# Patient Record
Sex: FEMALE
[De-identification: names, ages, dates, MRNs, and addresses within clinical notes are randomized per-mention and may not be internally consistent; named-entity substitution may affect disease eponyms.]

---

## 2024-02-04 ENCOUNTER — HOSPITAL ENCOUNTER (EMERGENCY)
Dept: HOSPITAL 82 - ED | Age: 79
Discharge: HOME | End: 2024-02-04
Payer: MEDICARE

## 2024-02-04 VITALS — DIASTOLIC BLOOD PRESSURE: 64 MMHG | SYSTOLIC BLOOD PRESSURE: 143 MMHG

## 2024-02-04 VITALS — SYSTOLIC BLOOD PRESSURE: 126 MMHG | DIASTOLIC BLOOD PRESSURE: 46 MMHG

## 2024-02-04 VITALS — BODY MASS INDEX: 20.9 KG/M2 | HEIGHT: 66 IN | WEIGHT: 130.07 LBS

## 2024-02-04 VITALS — SYSTOLIC BLOOD PRESSURE: 125 MMHG | DIASTOLIC BLOOD PRESSURE: 61 MMHG

## 2024-02-04 VITALS — DIASTOLIC BLOOD PRESSURE: 64 MMHG | SYSTOLIC BLOOD PRESSURE: 125 MMHG

## 2024-02-04 VITALS — SYSTOLIC BLOOD PRESSURE: 126 MMHG | DIASTOLIC BLOOD PRESSURE: 56 MMHG

## 2024-02-04 VITALS — DIASTOLIC BLOOD PRESSURE: 57 MMHG | SYSTOLIC BLOOD PRESSURE: 123 MMHG

## 2024-02-04 VITALS — DIASTOLIC BLOOD PRESSURE: 54 MMHG | SYSTOLIC BLOOD PRESSURE: 126 MMHG

## 2024-02-04 DIAGNOSIS — Z20.822: ICD-10-CM

## 2024-02-04 DIAGNOSIS — R51.9: Primary | ICD-10-CM

## 2024-02-04 LAB
ALBUMIN SERPL-MCNC: 3.9 G/DL (ref 3.2–5)
ALP SERPL-CCNC: 94 U/L (ref 38–126)
ANION GAP SERPL CALCULATED.3IONS-SCNC: 10 MMOL/L
AST SERPL-CCNC: 31 U/L (ref 9–36)
BASOPHILS NFR BLD AUTO: 0.4 % (ref 0–3)
BUN SERPL-MCNC: 13 MG/DL (ref 8–23)
BUN/CREAT SERPL: 25
CHLORIDE SERPL-SCNC: 98 MMOL/L (ref 95–108)
CO2 SERPL-SCNC: 28 MMOL/L (ref 22–30)
CREAT SERPL-MCNC: 0.5 MG/DL (ref 0.5–1)
EOSINOPHIL NFR BLD AUTO: 0.2 % (ref 0–8)
ERYTHROCYTE [DISTWIDTH] IN BLOOD BY AUTOMATED COUNT: 14.1 % (ref 11.5–15.5)
HCT VFR BLD AUTO: 25.4 % (ref 37–47)
HGB BLD-MCNC: 8.3 G/DL (ref 12–16)
IMM GRANULOCYTES NFR BLD: 0.5 % (ref 0–5)
LYMPHOCYTES NFR BLD: 3.2 % (ref 15–41)
MCH RBC QN AUTO: 31.1 PG  CALC (ref 26–32)
MCHC RBC AUTO-ENTMCNC: 32.7 G/DL CAL (ref 32–36)
MCV RBC AUTO: 95.1 FL  CALC (ref 80–100)
MONOCYTES NFR BLD AUTO: 7.1 % (ref 2–13)
NEUTROPHILS # BLD AUTO: 12.57 THOU/UL (ref 2–7.15)
NEUTROPHILS NFR BLD AUTO: 88.6 % (ref 42–76)
PLATELET # BLD AUTO: 288 THOU/UL (ref 130–400)
POTASSIUM SERPL-SCNC: 3.3 MMOL/L (ref 3.5–5.1)
PROT SERPL-MCNC: 7.2 G/DL (ref 6.3–8.2)
RBC # BLD AUTO: 2.67 MILL/UL (ref 4.2–5.6)
SODIUM SERPL-SCNC: 133 MMOL/L (ref 137–146)

## 2024-08-07 ENCOUNTER — HOSPITAL ENCOUNTER (OUTPATIENT)
Facility: HOSPITAL | Age: 79
Discharge: HOME | End: 2024-08-10
Payer: MEDICARE

## 2024-08-07 LAB
ALBUMIN SERPL-MCNC: 4.1 G/DL (ref 3.4–5)
ALBUMIN/GLOB SERPL: 1.4 (ref 0.8–1.7)
ALP SERPL-CCNC: 66 U/L (ref 45–117)
ALT SERPL-CCNC: 42 U/L (ref 13–56)
ANION GAP SERPL CALC-SCNC: 6 MMOL/L (ref 3–18)
APPEARANCE UR: ABNORMAL
APTT PPP: 25 SEC (ref 23–36.4)
AST SERPL-CCNC: 32 U/L (ref 10–38)
BACTERIA URNS QL MICRO: ABNORMAL /HPF
BILIRUB SERPL-MCNC: 0.8 MG/DL (ref 0.2–1)
BILIRUB UR QL: NEGATIVE
BUN SERPL-MCNC: 19 MG/DL (ref 7–18)
BUN/CREAT SERPL: 26 (ref 12–20)
CALCIUM SERPL-MCNC: 10 MG/DL (ref 8.5–10.1)
CHLORIDE SERPL-SCNC: 103 MMOL/L (ref 100–111)
CO2 SERPL-SCNC: 31 MMOL/L (ref 21–32)
COLOR UR: YELLOW
CREAT SERPL-MCNC: 0.74 MG/DL (ref 0.6–1.3)
EPITH CASTS URNS QL MICRO: ABNORMAL /LPF (ref 0–5)
ERYTHROCYTE [DISTWIDTH] IN BLOOD BY AUTOMATED COUNT: 18.9 % (ref 11.6–14.5)
ERYTHROCYTE [SEDIMENTATION RATE] IN BLOOD: 4 MM/HR (ref 0–30)
GLOBULIN SER CALC-MCNC: 3 G/DL (ref 2–4)
GLUCOSE SERPL-MCNC: 113 MG/DL (ref 74–99)
GLUCOSE UR STRIP.AUTO-MCNC: NEGATIVE MG/DL
HCT VFR BLD AUTO: 36.5 % (ref 35–45)
HGB BLD-MCNC: 12 G/DL (ref 12–16)
HGB UR QL STRIP: NEGATIVE
INR PPP: 0.9 (ref 0.9–1.1)
KETONES UR QL STRIP.AUTO: ABNORMAL MG/DL
LEUKOCYTE ESTERASE UR QL STRIP.AUTO: ABNORMAL
MCH RBC QN AUTO: 29.6 PG (ref 24–34)
MCHC RBC AUTO-ENTMCNC: 32.9 G/DL (ref 31–37)
MCV RBC AUTO: 89.9 FL (ref 78–100)
NITRITE UR QL STRIP.AUTO: NEGATIVE
NRBC # BLD: 0 K/UL (ref 0–0.01)
NRBC BLD-RTO: 0 PER 100 WBC
PH UR STRIP: 7.5 (ref 5–8)
PLATELET # BLD AUTO: 228 K/UL (ref 135–420)
PMV BLD AUTO: 9.5 FL (ref 9.2–11.8)
POTASSIUM SERPL-SCNC: 3.8 MMOL/L (ref 3.5–5.5)
PROT SERPL-MCNC: 7.1 G/DL (ref 6.4–8.2)
PROT UR STRIP-MCNC: NEGATIVE MG/DL
PROTHROMBIN TIME: 12.5 SEC (ref 11.9–14.9)
RBC # BLD AUTO: 4.06 M/UL (ref 4.2–5.3)
RBC #/AREA URNS HPF: ABNORMAL /HPF (ref 0–5)
SODIUM SERPL-SCNC: 140 MMOL/L (ref 136–145)
SP GR UR REFRACTOMETRY: 1.01 (ref 1–1.03)
UROBILINOGEN UR QL STRIP.AUTO: 0.2 EU/DL (ref 0.2–1)
WBC # BLD AUTO: 10.4 K/UL (ref 4.6–13.2)
WBC URNS QL MICRO: ABNORMAL /HPF (ref 0–5)

## 2024-08-07 PROCEDURE — 85027 COMPLETE CBC AUTOMATED: CPT

## 2024-08-07 PROCEDURE — 85652 RBC SED RATE AUTOMATED: CPT

## 2024-08-07 PROCEDURE — 85610 PROTHROMBIN TIME: CPT

## 2024-08-07 PROCEDURE — 85730 THROMBOPLASTIN TIME PARTIAL: CPT

## 2024-08-07 PROCEDURE — 87086 URINE CULTURE/COLONY COUNT: CPT

## 2024-08-07 PROCEDURE — 36415 COLL VENOUS BLD VENIPUNCTURE: CPT

## 2024-08-07 PROCEDURE — 81001 URINALYSIS AUTO W/SCOPE: CPT

## 2024-08-07 PROCEDURE — 93005 ELECTROCARDIOGRAM TRACING: CPT | Performed by: ORTHOPAEDIC SURGERY

## 2024-08-07 PROCEDURE — 80053 COMPREHEN METABOLIC PANEL: CPT

## 2024-08-08 LAB
BACTERIA SPEC CULT: NORMAL
BACTERIA SPEC CULT: NORMAL
EKG ATRIAL RATE: 61 BPM
EKG DIAGNOSIS: NORMAL
EKG P AXIS: 56 DEGREES
EKG P-R INTERVAL: 134 MS
EKG Q-T INTERVAL: 406 MS
EKG QRS DURATION: 78 MS
EKG QTC CALCULATION (BAZETT): 408 MS
EKG R AXIS: 71 DEGREES
EKG T AXIS: 69 DEGREES
EKG VENTRICULAR RATE: 61 BPM
SERVICE CMNT-IMP: NORMAL

## 2024-08-09 LAB
BACTERIA SPEC CULT: NORMAL
SERVICE CMNT-IMP: NORMAL

## 2024-08-24 ENCOUNTER — HOSPITAL ENCOUNTER (INPATIENT)
Facility: HOSPITAL | Age: 79
LOS: 14 days | Discharge: NURSING, REHAB OR LONG-TERM CARE | DRG: 467 | End: 2024-09-07
Attending: ORTHOPAEDIC SURGERY | Admitting: ORTHOPAEDIC SURGERY
Payer: MEDICARE

## 2024-08-24 DIAGNOSIS — M00.00 STAPHYLOCOCCAL ARTHRITIS, SEPTIC ARTHRITIS OF UNSPECIFIED LOCATION (HCC): Primary | ICD-10-CM

## 2024-08-24 DIAGNOSIS — Z98.890 S/P ARTHROSCOPY OF RIGHT KNEE: ICD-10-CM

## 2024-08-24 DIAGNOSIS — T84.53XA INFECTION AND INFLAMMATORY REACTION DUE TO INTERNAL RIGHT KNEE PROSTHESIS, INITIAL ENCOUNTER (HCC): ICD-10-CM

## 2024-08-24 DIAGNOSIS — Z96.651 S/P REVISION OF TOTAL KNEE, RIGHT: ICD-10-CM

## 2024-08-24 PROCEDURE — 1100000000 HC RM PRIVATE

## 2024-08-24 PROCEDURE — 02H633Z INSERTION OF INFUSION DEVICE INTO RIGHT ATRIUM, PERCUTANEOUS APPROACH: ICD-10-PCS | Performed by: HOSPITALIST

## 2024-08-24 PROCEDURE — 2580000003 HC RX 258: Performed by: ORTHOPAEDIC SURGERY

## 2024-08-24 RX ORDER — SODIUM CHLORIDE 9 MG/ML
INJECTION, SOLUTION INTRAVENOUS CONTINUOUS
Status: DISCONTINUED | OUTPATIENT
Start: 2024-08-24 | End: 2024-08-27 | Stop reason: HOSPADM

## 2024-08-24 RX ORDER — ONDANSETRON 4 MG/1
4 TABLET, ORALLY DISINTEGRATING ORAL EVERY 6 HOURS PRN
Status: DISCONTINUED | OUTPATIENT
Start: 2024-08-24 | End: 2024-08-26 | Stop reason: ALTCHOICE

## 2024-08-24 RX ORDER — OXYCODONE HYDROCHLORIDE 5 MG/1
5 TABLET ORAL EVERY 4 HOURS PRN
Status: DISCONTINUED | OUTPATIENT
Start: 2024-08-24 | End: 2024-08-27

## 2024-08-24 RX ADMIN — SODIUM CHLORIDE: 9 INJECTION, SOLUTION INTRAVENOUS at 23:28

## 2024-08-24 ASSESSMENT — PAIN SCALES - GENERAL: PAINLEVEL_OUTOF10: 5

## 2024-08-24 ASSESSMENT — PAIN DESCRIPTION - PAIN TYPE: TYPE: CHRONIC PAIN

## 2024-08-24 ASSESSMENT — PAIN DESCRIPTION - LOCATION: LOCATION: KNEE

## 2024-08-24 ASSESSMENT — PAIN DESCRIPTION - DESCRIPTORS: DESCRIPTORS: SHARP;ACHING

## 2024-08-24 ASSESSMENT — PAIN - FUNCTIONAL ASSESSMENT: PAIN_FUNCTIONAL_ASSESSMENT: PREVENTS OR INTERFERES WITH MANY ACTIVE NOT PASSIVE ACTIVITIES

## 2024-08-24 ASSESSMENT — PAIN DESCRIPTION - ORIENTATION: ORIENTATION: RIGHT

## 2024-08-24 ASSESSMENT — PAIN DESCRIPTION - FREQUENCY: FREQUENCY: INTERMITTENT

## 2024-08-24 ASSESSMENT — PAIN DESCRIPTION - ONSET: ONSET: ON-GOING

## 2024-08-25 PROBLEM — M00.9 SEPTIC JOINT (HCC): Status: ACTIVE | Noted: 2024-08-25

## 2024-08-25 PROBLEM — M31.6 TEMPORAL ARTERITIS (HCC): Status: ACTIVE | Noted: 2024-08-25

## 2024-08-25 PROBLEM — E87.6 HYPOKALEMIA: Status: ACTIVE | Noted: 2024-08-25

## 2024-08-25 PROBLEM — I10 HTN (HYPERTENSION): Status: ACTIVE | Noted: 2024-08-25

## 2024-08-25 LAB
ALBUMIN SERPL-MCNC: 3.2 G/DL (ref 3.4–5)
ALBUMIN/GLOB SERPL: 1.3 (ref 0.8–1.7)
ALP SERPL-CCNC: 53 U/L (ref 45–117)
ALT SERPL-CCNC: 22 U/L (ref 13–56)
ANION GAP SERPL CALC-SCNC: 7 MMOL/L (ref 3–18)
APPEARANCE UR: CLEAR
AST SERPL-CCNC: 20 U/L (ref 10–38)
BACTERIA URNS QL MICRO: ABNORMAL /HPF
BILIRUB SERPL-MCNC: 0.4 MG/DL (ref 0.2–1)
BILIRUB UR QL: NEGATIVE
BUN SERPL-MCNC: 12 MG/DL (ref 7–18)
BUN/CREAT SERPL: 21 (ref 12–20)
CALCIUM SERPL-MCNC: 9.1 MG/DL (ref 8.5–10.1)
CHLORIDE SERPL-SCNC: 107 MMOL/L (ref 100–111)
CO2 SERPL-SCNC: 27 MMOL/L (ref 21–32)
COLOR UR: YELLOW
CREAT SERPL-MCNC: 0.57 MG/DL (ref 0.6–1.3)
EPITH CASTS URNS QL MICRO: ABNORMAL /LPF (ref 0–5)
GLOBULIN SER CALC-MCNC: 2.4 G/DL (ref 2–4)
GLUCOSE SERPL-MCNC: 126 MG/DL (ref 74–99)
GLUCOSE UR STRIP.AUTO-MCNC: NEGATIVE MG/DL
HGB UR QL STRIP: NEGATIVE
KETONES UR QL STRIP.AUTO: 15 MG/DL
LEUKOCYTE ESTERASE UR QL STRIP.AUTO: ABNORMAL
NITRITE UR QL STRIP.AUTO: NEGATIVE
PH UR STRIP: 6.5 (ref 5–8)
POTASSIUM SERPL-SCNC: 3.6 MMOL/L (ref 3.5–5.5)
PROT SERPL-MCNC: 5.6 G/DL (ref 6.4–8.2)
PROT UR STRIP-MCNC: NEGATIVE MG/DL
RBC #/AREA URNS HPF: ABNORMAL /HPF (ref 0–5)
SODIUM SERPL-SCNC: 141 MMOL/L (ref 136–145)
SP GR UR REFRACTOMETRY: 1.02 (ref 1–1.03)
UROBILINOGEN UR QL STRIP.AUTO: 1 EU/DL (ref 0.2–1)
WBC URNS QL MICRO: ABNORMAL /HPF (ref 0–5)

## 2024-08-25 PROCEDURE — 6370000000 HC RX 637 (ALT 250 FOR IP): Performed by: INTERNAL MEDICINE

## 2024-08-25 PROCEDURE — 84443 ASSAY THYROID STIM HORMONE: CPT

## 2024-08-25 PROCEDURE — 80053 COMPREHEN METABOLIC PANEL: CPT

## 2024-08-25 PROCEDURE — 87086 URINE CULTURE/COLONY COUNT: CPT

## 2024-08-25 PROCEDURE — 6370000000 HC RX 637 (ALT 250 FOR IP): Performed by: ORTHOPAEDIC SURGERY

## 2024-08-25 PROCEDURE — 85025 COMPLETE CBC W/AUTO DIFF WBC: CPT

## 2024-08-25 PROCEDURE — 81001 URINALYSIS AUTO W/SCOPE: CPT

## 2024-08-25 PROCEDURE — 86140 C-REACTIVE PROTEIN: CPT

## 2024-08-25 PROCEDURE — 1100000000 HC RM PRIVATE

## 2024-08-25 PROCEDURE — 87040 BLOOD CULTURE FOR BACTERIA: CPT

## 2024-08-25 PROCEDURE — 85652 RBC SED RATE AUTOMATED: CPT

## 2024-08-25 PROCEDURE — 6360000002 HC RX W HCPCS: Performed by: INTERNAL MEDICINE

## 2024-08-25 PROCEDURE — 2580000003 HC RX 258: Performed by: ORTHOPAEDIC SURGERY

## 2024-08-25 PROCEDURE — 36415 COLL VENOUS BLD VENIPUNCTURE: CPT

## 2024-08-25 PROCEDURE — 2580000003 HC RX 258: Performed by: INTERNAL MEDICINE

## 2024-08-25 PROCEDURE — 6360000002 HC RX W HCPCS: Performed by: ORTHOPAEDIC SURGERY

## 2024-08-25 RX ORDER — M-VIT,TX,IRON,MINS/CALC/FOLIC 27MG-0.4MG
1 TABLET ORAL DAILY
Status: DISCONTINUED | OUTPATIENT
Start: 2024-08-25 | End: 2024-09-07 | Stop reason: HOSPADM

## 2024-08-25 RX ORDER — POLYETHYLENE GLYCOL 3350 17 G/17G
17 POWDER, FOR SOLUTION ORAL DAILY PRN
Status: DISCONTINUED | OUTPATIENT
Start: 2024-08-25 | End: 2024-09-07 | Stop reason: HOSPADM

## 2024-08-25 RX ORDER — CALCIUM POLYCARBOPHIL 625 MG 625 MG/1
625 TABLET ORAL 2 TIMES DAILY
Status: DISCONTINUED | OUTPATIENT
Start: 2024-08-25 | End: 2024-09-07 | Stop reason: HOSPADM

## 2024-08-25 RX ORDER — PREDNISONE 1 MG/1
4 TABLET ORAL DAILY
Status: DISCONTINUED | OUTPATIENT
Start: 2024-08-26 | End: 2024-09-07 | Stop reason: HOSPADM

## 2024-08-25 RX ORDER — POTASSIUM CHLORIDE 1500 MG/1
40 TABLET, EXTENDED RELEASE ORAL ONCE
Status: COMPLETED | OUTPATIENT
Start: 2024-08-25 | End: 2024-08-25

## 2024-08-25 RX ORDER — SODIUM CHLORIDE 0.9 % (FLUSH) 0.9 %
5-40 SYRINGE (ML) INJECTION PRN
Status: DISCONTINUED | OUTPATIENT
Start: 2024-08-25 | End: 2024-08-28

## 2024-08-25 RX ORDER — SODIUM CHLORIDE 0.9 % (FLUSH) 0.9 %
5-40 SYRINGE (ML) INJECTION EVERY 12 HOURS SCHEDULED
Status: DISCONTINUED | OUTPATIENT
Start: 2024-08-25 | End: 2024-08-28

## 2024-08-25 RX ORDER — ACETAMINOPHEN 500 MG
1000 TABLET ORAL EVERY 6 HOURS PRN
Status: DISCONTINUED | OUTPATIENT
Start: 2024-08-25 | End: 2024-09-07 | Stop reason: HOSPADM

## 2024-08-25 RX ORDER — PREDNISONE 5 MG/1
5 TABLET ORAL DAILY
Status: DISCONTINUED | OUTPATIENT
Start: 2024-08-25 | End: 2024-08-25

## 2024-08-25 RX ORDER — PANTOPRAZOLE SODIUM 40 MG/1
40 TABLET, DELAYED RELEASE ORAL
Status: DISCONTINUED | OUTPATIENT
Start: 2024-08-26 | End: 2024-09-07 | Stop reason: HOSPADM

## 2024-08-25 RX ORDER — HYDRALAZINE HYDROCHLORIDE 20 MG/ML
10 INJECTION INTRAMUSCULAR; INTRAVENOUS EVERY 6 HOURS PRN
Status: DISCONTINUED | OUTPATIENT
Start: 2024-08-25 | End: 2024-09-07 | Stop reason: HOSPADM

## 2024-08-25 RX ORDER — ONDANSETRON 2 MG/ML
4 INJECTION INTRAMUSCULAR; INTRAVENOUS EVERY 6 HOURS PRN
Status: DISCONTINUED | OUTPATIENT
Start: 2024-08-25 | End: 2024-09-07 | Stop reason: HOSPADM

## 2024-08-25 RX ORDER — ENOXAPARIN SODIUM 100 MG/ML
40 INJECTION SUBCUTANEOUS DAILY
Status: DISCONTINUED | OUTPATIENT
Start: 2024-08-26 | End: 2024-09-07 | Stop reason: HOSPADM

## 2024-08-25 RX ORDER — ONDANSETRON 4 MG/1
4 TABLET, ORALLY DISINTEGRATING ORAL EVERY 8 HOURS PRN
Status: DISCONTINUED | OUTPATIENT
Start: 2024-08-25 | End: 2024-09-07 | Stop reason: HOSPADM

## 2024-08-25 RX ORDER — SODIUM CHLORIDE 9 MG/ML
INJECTION, SOLUTION INTRAVENOUS PRN
Status: DISCONTINUED | OUTPATIENT
Start: 2024-08-25 | End: 2024-08-28

## 2024-08-25 RX ADMIN — VANCOMYCIN HYDROCHLORIDE 750 MG: 750 INJECTION, POWDER, LYOPHILIZED, FOR SOLUTION INTRAVENOUS at 17:25

## 2024-08-25 RX ADMIN — Medication 1 TABLET: at 20:39

## 2024-08-25 RX ADMIN — SODIUM CHLORIDE, PRESERVATIVE FREE 10 ML: 5 INJECTION INTRAVENOUS at 20:48

## 2024-08-25 RX ADMIN — OXYCODONE HYDROCHLORIDE 5 MG: 5 TABLET ORAL at 12:47

## 2024-08-25 RX ADMIN — POTASSIUM CHLORIDE 40 MEQ: 1500 TABLET, EXTENDED RELEASE ORAL at 20:34

## 2024-08-25 RX ADMIN — OXYCODONE HYDROCHLORIDE 5 MG: 5 TABLET ORAL at 08:28

## 2024-08-25 RX ADMIN — VANCOMYCIN HYDROCHLORIDE 1500 MG: 10 INJECTION, POWDER, LYOPHILIZED, FOR SOLUTION INTRAVENOUS at 03:56

## 2024-08-25 RX ADMIN — PIPERACILLIN AND TAZOBACTAM 3375 MG: 3; .375 INJECTION, POWDER, LYOPHILIZED, FOR SOLUTION INTRAVENOUS at 20:41

## 2024-08-25 RX ADMIN — SODIUM CHLORIDE: 9 INJECTION, SOLUTION INTRAVENOUS at 16:22

## 2024-08-25 RX ADMIN — OXYCODONE HYDROCHLORIDE 5 MG: 5 TABLET ORAL at 20:32

## 2024-08-25 ASSESSMENT — PAIN DESCRIPTION - ORIENTATION
ORIENTATION: RIGHT
ORIENTATION: RIGHT
ORIENTATION: LEFT

## 2024-08-25 ASSESSMENT — PAIN SCALES - GENERAL
PAINLEVEL_OUTOF10: 9
PAINLEVEL_OUTOF10: 0
PAINLEVEL_OUTOF10: 10
PAINLEVEL_OUTOF10: 10
PAINLEVEL_OUTOF10: 0

## 2024-08-25 ASSESSMENT — PAIN - FUNCTIONAL ASSESSMENT: PAIN_FUNCTIONAL_ASSESSMENT: PREVENTS OR INTERFERES SOME ACTIVE ACTIVITIES AND ADLS

## 2024-08-25 ASSESSMENT — PAIN DESCRIPTION - ONSET: ONSET: ON-GOING

## 2024-08-25 ASSESSMENT — PAIN DESCRIPTION - PAIN TYPE: TYPE: ACUTE PAIN

## 2024-08-25 ASSESSMENT — PAIN DESCRIPTION - LOCATION
LOCATION: FOOT;LEG;KNEE
LOCATION: LEG;FOOT
LOCATION: KNEE

## 2024-08-25 ASSESSMENT — PAIN DESCRIPTION - FREQUENCY: FREQUENCY: INTERMITTENT

## 2024-08-25 ASSESSMENT — PAIN DESCRIPTION - DESCRIPTORS
DESCRIPTORS: ACHING;DULL
DESCRIPTORS: ACHING
DESCRIPTORS: ACHING

## 2024-08-25 NOTE — PROGRESS NOTES
[facility]   Pharmacy Pharmacokinetic Monitoring Service - Vancomycin     Lanny Elliott is a 79 y.o. female starting on vancomycin therapy for bone and joint infection. Pharmacy consulted by Dr. Harish Dailey for monitoring and adjustment.    Target Concentration: Goal AUC/FUNMI 400-600 mg*hr/L    Additional Antimicrobials: none    Pertinent Laboratory Values:   Wt Readings from Last 1 Encounters:   08/25/24 59 kg (130 lb)     Temp Readings from Last 1 Encounters:   08/25/24 98.4 °F (36.9 °C) (Oral)     Estimated Creatinine Clearance: 70 mL/min (A) (based on SCr of 0.57 mg/dL (L)).  Recent Labs     08/25/24  1044   CREATININE 0.57*   BUN 12     Plan:  Dosing recommendations based on Bayesian software  Vancomycin 1500 mg x 1 given on 8/25/24 @ 0356, followed by Vancomycin 750 mg IV q12h  Anticipated AUC of 468 mg/l.h and trough concentration of 12.9 mg/l at steady state  Renal labs as indicated   Pharmacy will continue to monitor patient and adjust therapy as indicated    DOLORES WHITT RPH, BCPS   8/25/2024 4:34 PM

## 2024-08-25 NOTE — PROGRESS NOTES
Bedside and Verbal shift change report given to Collette RN (oncoming nurse) by Marita RN (offgoing nurse). Report included the following information Nurse Handoff Report, Adult Overview, Surgery Report, Intake/Output, MAR, Recent Results, and Med Rec Status.

## 2024-08-25 NOTE — H&P
History and Physical    Patient: Lanny Elliott               Sex: female          DOA: 8/24/2024         YOB: 1945      Age:  79 y.o.        LOS:  LOS: 1 day              HPI:     Lanny Elliott is a 79 y.o. female who has been seen for right knee pain and swelling.  She had previously been scheduled for a revision total knee replacement with Dr. Jarrett for Sept 10 but pain and swelling led her to the ER and transfer to [facility] for earlier consideration. Pain is better controlled today    Past Medical History:   Diagnosis Date    Acid reflux disease     Arthritis     Temporal arteritis (HCC) 2024    Venous insufficiency     noted in CE       Past Surgical History:   Procedure Laterality Date    BLADDER REPAIR  2017    lift    HYSTERECTOMY VAGINAL  1986    partial    JOINT REPLACEMENT Right 2004    index and middle finger    PARTIAL KNEE ARTHROPLASTY Bilateral     ROTATOR CUFF REPAIR Bilateral        No family history on file.    Social History     Socioeconomic History    Marital status:    Tobacco Use    Smoking status: Former     Types: Cigarettes    Smokeless tobacco: Never   Vaping Use    Vaping status: Never Used   Substance and Sexual Activity    Alcohol use: Yes     Alcohol/week: 7.0 standard drinks of alcohol     Types: 7 Drinks containing 0.5 oz of alcohol per week    Drug use: Never    Sexual activity: Not Currently     Social Determinants of Health     Food Insecurity: No Food Insecurity (8/24/2024)    Hunger Vital Sign     Worried About Running Out of Food in the Last Year: Never true     Ran Out of Food in the Last Year: Never true   Transportation Needs: No Transportation Needs (8/24/2024)    PRAPARE - Transportation     Lack of Transportation (Medical): No     Lack of Transportation (Non-Medical): No   Housing Stability: Low Risk  (8/24/2024)    Housing Stability Vital Sign     Unable to Pay for Housing in the Last Year: No     Number of Times Moved in the Last Year: 1

## 2024-08-25 NOTE — PROGRESS NOTES
Received report from SHABANA Domingo. Pt AAOx3, NAD, breathing non labored, on room air, HOB up. IV sites clean, dry and intact. IVF going per order.  Bed at the lowest level on lock position, call bell w/i reach. Bed alarm on.

## 2024-08-25 NOTE — CARE COORDINATION
Anticipated discharge plan: When medically cleared, home with physician follow-up, Family to drive patient, depending on progression and Multi-Disciplinary team recommendations.     CM met with patient at bedside, introduced self and explained role. Patient agreeable to complete assessment . Patient states she was independent prior to admission  Patient states she was transferred from a [place] Emergency Department. Patient states she lives with her spouse. All questions answered. CM will continue to monitor patient progression.

## 2024-08-25 NOTE — CARE COORDINATION
08/25/24 1305   Service Assessment   Patient Orientation Alert and Oriented;Place;Person;Situation;Self   Cognition Alert   History Provided By Patient   Primary Caregiver Self   Support Systems Spouse/Significant Other   PCP Verified by CM Yes  (PCP on file)   Last Visit to PCP Within last 3 months   Prior Functional Level Independent in ADLs/IADLs   Current Functional Level Independent in ADLs/IADLs   Can patient return to prior living arrangement Yes   Ability to make needs known: Good   Family able to assist with home care needs: Yes   Financial Resources Medicare   CM/SW Referral Other (see comment)  (discharge planning)   Social/Functional History   Lives With Spouse   Type of Home House   Home Layout Two level   Home Access Stairs to enter with rails   Entrance Stairs - Number of Steps 5 steps   Entrance Stairs - Rails Both   Bathroom Shower/Tub Walk-in shower   Bathroom Toilet Bedside commode   Bathroom Equipment Grab bars in shower   Home Equipment Cane;Walker - Standard   ADL Assistance Independent   Homemaking Assistance Independent   Ambulation Assistance Independent   Transfer Assistance Independent   Active  No   Patient's  Info  drives   Mode of Transportation Car   Occupation Retired   Discharge Planning   Type of Residence House  (Patient prefer to discharge home)   Living Arrangements Spouse/Significant Other   Current Services Prior To Admission Durable Medical Equipment   Current DME Prior to Arrival Bedside Commode;Cane;Walker   Potential Assistance Needed   (pending progression)   Potential Assistance Purchasing Medications No   Patient expects to be discharged to: House   One/Two Story Residence Two story   # of Interior Steps   (full flight of stairs)   History of falls? 0   Services At/After Discharge   Transition of Care Consult (CM Consult)   (pending progression)   Services At/After Discharge   (pending progression)   Confirm Follow Up Transport Family  (family will

## 2024-08-25 NOTE — PROGRESS NOTES
TRANSFER - IN REPORT:    Verbal report received from SHABANA Bender on [facility]  being received from formerly [facility] for routine progression of patient care      Report consisted of patient's Situation, Background, Assessment and   Recommendations(SBAR).     Information from the following report(s) Nurse Handoff Report, ED Encounter Summary, ED SBAR, Adult Overview, Intake/Output, MAR, and Recent Results was reviewed with the receiving nurse.    Opportunity for questions and clarification was provided.      Assessment completed upon patient's arrival to unit and care assumed.

## 2024-08-26 ENCOUNTER — ANESTHESIA EVENT (OUTPATIENT)
Facility: HOSPITAL | Age: 79
End: 2024-08-26
Payer: MEDICARE

## 2024-08-26 ENCOUNTER — APPOINTMENT (OUTPATIENT)
Facility: HOSPITAL | Age: 79
DRG: 467 | End: 2024-08-26
Attending: INTERNAL MEDICINE
Payer: MEDICARE

## 2024-08-26 LAB
ALBUMIN SERPL-MCNC: 3.2 G/DL (ref 3.4–5)
ALBUMIN/GLOB SERPL: 1.2 (ref 0.8–1.7)
ALP SERPL-CCNC: 50 U/L (ref 45–117)
ALT SERPL-CCNC: 23 U/L (ref 13–56)
ANION GAP SERPL CALC-SCNC: 6 MMOL/L (ref 3–18)
ANION GAP SERPL CALC-SCNC: 7 MMOL/L (ref 3–18)
APTT PPP: 24 SEC (ref 23–36.4)
AST SERPL-CCNC: 23 U/L (ref 10–38)
BASOPHILS # BLD: 0.1 K/UL (ref 0–0.1)
BASOPHILS # BLD: 0.1 K/UL (ref 0–0.1)
BASOPHILS NFR BLD: 1 % (ref 0–2)
BASOPHILS NFR BLD: 1 % (ref 0–2)
BILIRUB SERPL-MCNC: 0.5 MG/DL (ref 0.2–1)
BUN SERPL-MCNC: 12 MG/DL (ref 7–18)
BUN SERPL-MCNC: 13 MG/DL (ref 7–18)
BUN/CREAT SERPL: 24 (ref 12–20)
BUN/CREAT SERPL: 24 (ref 12–20)
CALCIUM SERPL-MCNC: 9.1 MG/DL (ref 8.5–10.1)
CALCIUM SERPL-MCNC: 9.2 MG/DL (ref 8.5–10.1)
CHLORIDE SERPL-SCNC: 109 MMOL/L (ref 100–111)
CHLORIDE SERPL-SCNC: 109 MMOL/L (ref 100–111)
CO2 SERPL-SCNC: 26 MMOL/L (ref 21–32)
CO2 SERPL-SCNC: 26 MMOL/L (ref 21–32)
CREAT SERPL-MCNC: 0.51 MG/DL (ref 0.6–1.3)
CREAT SERPL-MCNC: 0.55 MG/DL (ref 0.6–1.3)
CRP SERPL-MCNC: 0.9 MG/DL (ref 0–0.3)
DIFFERENTIAL METHOD BLD: ABNORMAL
DIFFERENTIAL METHOD BLD: ABNORMAL
ECHO AO ASC DIAM: 3.6 CM
ECHO AO ASCENDING AORTA INDEX: 2.21 CM/M2
ECHO AO ROOT DIAM: 3.2 CM
ECHO AO ROOT INDEX: 1.96 CM/M2
ECHO AR MAX VEL PISA: 4.1 M/S
ECHO AV AREA PEAK VELOCITY: 2 CM2
ECHO AV AREA VTI: 2.1 CM2
ECHO AV AREA/BSA PEAK VELOCITY: 1.2 CM2/M2
ECHO AV AREA/BSA VTI: 1.3 CM2/M2
ECHO AV MEAN GRADIENT: 4 MMHG
ECHO AV MEAN VELOCITY: 0.9 M/S
ECHO AV PEAK GRADIENT: 8 MMHG
ECHO AV PEAK VELOCITY: 1.4 M/S
ECHO AV REGURGITANT PHT: 473.3 MILLISECOND
ECHO AV VELOCITY RATIO: 0.86
ECHO AV VTI: 33.1 CM
ECHO BSA: 1.63 M2
ECHO EST RA PRESSURE: 8 MMHG
ECHO LA DIAMETER INDEX: 1.84 CM/M2
ECHO LA DIAMETER: 3 CM
ECHO LA TO AORTIC ROOT RATIO: 0.94
ECHO LA VOL A-L A2C: 40 ML (ref 22–52)
ECHO LA VOL A-L A4C: 48 ML (ref 22–52)
ECHO LA VOL BP: 41 ML (ref 22–52)
ECHO LA VOL MOD A2C: 38 ML (ref 22–52)
ECHO LA VOL MOD A4C: 42 ML (ref 22–52)
ECHO LA VOL/BSA BIPLANE: 25 ML/M2 (ref 16–34)
ECHO LA VOLUME AREA LENGTH: 44 ML
ECHO LA VOLUME INDEX A-L A2C: 25 ML/M2 (ref 16–34)
ECHO LA VOLUME INDEX A-L A4C: 29 ML/M2 (ref 16–34)
ECHO LA VOLUME INDEX AREA LENGTH: 27 ML/M2 (ref 16–34)
ECHO LA VOLUME INDEX MOD A2C: 23 ML/M2 (ref 16–34)
ECHO LA VOLUME INDEX MOD A4C: 26 ML/M2 (ref 16–34)
ECHO LV E' LATERAL VELOCITY: 7 CM/S
ECHO LV E' SEPTAL VELOCITY: 7 CM/S
ECHO LV EDV A2C: 73 ML
ECHO LV EDV A4C: 91 ML
ECHO LV EDV BP: 84 ML (ref 56–104)
ECHO LV EDV INDEX A4C: 56 ML/M2
ECHO LV EDV INDEX BP: 52 ML/M2
ECHO LV EDV NDEX A2C: 45 ML/M2
ECHO LV EJECTION FRACTION A2C: 66 %
ECHO LV EJECTION FRACTION A4C: 67 %
ECHO LV EJECTION FRACTION BIPLANE: 66 % (ref 55–100)
ECHO LV ESV A2C: 25 ML
ECHO LV ESV A4C: 30 ML
ECHO LV ESV BP: 29 ML (ref 19–49)
ECHO LV ESV INDEX A2C: 15 ML/M2
ECHO LV ESV INDEX A4C: 18 ML/M2
ECHO LV ESV INDEX BP: 18 ML/M2
ECHO LV FRACTIONAL SHORTENING: 41 % (ref 28–44)
ECHO LV INTERNAL DIMENSION DIASTOLE INDEX: 2.27 CM/M2
ECHO LV INTERNAL DIMENSION DIASTOLIC: 3.7 CM (ref 3.9–5.3)
ECHO LV INTERNAL DIMENSION SYSTOLIC INDEX: 1.35 CM/M2
ECHO LV INTERNAL DIMENSION SYSTOLIC: 2.2 CM
ECHO LV IVSD: 1.3 CM (ref 0.6–0.9)
ECHO LV MASS 2D: 138.2 G (ref 67–162)
ECHO LV MASS INDEX 2D: 84.8 G/M2 (ref 43–95)
ECHO LV POSTERIOR WALL DIASTOLIC: 1 CM (ref 0.6–0.9)
ECHO LV RELATIVE WALL THICKNESS RATIO: 0.54
ECHO LVOT AREA: 2.3 CM2
ECHO LVOT AV VTI INDEX: 0.92
ECHO LVOT DIAM: 1.7 CM
ECHO LVOT MEAN GRADIENT: 3 MMHG
ECHO LVOT PEAK GRADIENT: 6 MMHG
ECHO LVOT PEAK VELOCITY: 1.2 M/S
ECHO LVOT STROKE VOLUME INDEX: 42.3 ML/M2
ECHO LVOT SV: 69 ML
ECHO LVOT VTI: 30.4 CM
ECHO MV A VELOCITY: 0.72 M/S
ECHO MV E DECELERATION TIME (DT): 208.8 MS
ECHO MV E VELOCITY: 0.78 M/S
ECHO MV E/A RATIO: 1.08
ECHO MV E/E' LATERAL: 11.14
ECHO MV E/E' RATIO (AVERAGED): 11.14
ECHO MV E/E' SEPTAL: 11.14
ECHO PULMONARY ARTERY SYSTOLIC PRESSURE (PASP): 37 MMHG
ECHO RA END SYSTOLIC VOLUME APICAL 4 CHAMBER INDEX BSA: 11 ML/M2
ECHO RA VOLUME: 18 ML
ECHO RIGHT VENTRICULAR SYSTOLIC PRESSURE (RVSP): 37 MMHG
ECHO RV INTERNAL DIMENSION: 3 CM
ECHO RV TAPSE: 2.5 CM (ref 1.7–?)
ECHO RVOT MEAN GRADIENT: 1 MMHG
ECHO RVOT PEAK GRADIENT: 2 MMHG
ECHO RVOT PEAK VELOCITY: 0.7 M/S
ECHO RVOT VTI: 16.6 CM
ECHO TV REGURGITANT MAX VELOCITY: 2.68 M/S
ECHO TV REGURGITANT PEAK GRADIENT: 29 MMHG
EOSINOPHIL # BLD: 0.2 K/UL (ref 0–0.4)
EOSINOPHIL # BLD: 0.2 K/UL (ref 0–0.4)
EOSINOPHIL NFR BLD: 3 % (ref 0–5)
EOSINOPHIL NFR BLD: 3 % (ref 0–5)
ERYTHROCYTE [DISTWIDTH] IN BLOOD BY AUTOMATED COUNT: 16.4 % (ref 11.6–14.5)
ERYTHROCYTE [DISTWIDTH] IN BLOOD BY AUTOMATED COUNT: 16.5 % (ref 11.6–14.5)
ERYTHROCYTE [SEDIMENTATION RATE] IN BLOOD: 1 MM/HR (ref 0–30)
GLOBULIN SER CALC-MCNC: 2.6 G/DL (ref 2–4)
GLUCOSE SERPL-MCNC: 105 MG/DL (ref 74–99)
GLUCOSE SERPL-MCNC: 99 MG/DL (ref 74–99)
HCT VFR BLD AUTO: 32.1 % (ref 35–45)
HCT VFR BLD AUTO: 33 % (ref 35–45)
HGB BLD-MCNC: 10.7 G/DL (ref 12–16)
HGB BLD-MCNC: 11 G/DL (ref 12–16)
IMM GRANULOCYTES # BLD AUTO: 0 K/UL (ref 0–0.04)
IMM GRANULOCYTES # BLD AUTO: 0.1 K/UL (ref 0–0.04)
IMM GRANULOCYTES NFR BLD AUTO: 1 % (ref 0–0.5)
IMM GRANULOCYTES NFR BLD AUTO: 2 % (ref 0–0.5)
INR PPP: 0.9 (ref 0.9–1.1)
LYMPHOCYTES # BLD: 0.6 K/UL (ref 0.9–3.6)
LYMPHOCYTES # BLD: 0.6 K/UL (ref 0.9–3.6)
LYMPHOCYTES NFR BLD: 10 % (ref 21–52)
LYMPHOCYTES NFR BLD: 9 % (ref 21–52)
MAGNESIUM SERPL-MCNC: 1.7 MG/DL (ref 1.6–2.6)
MCH RBC QN AUTO: 31 PG (ref 24–34)
MCH RBC QN AUTO: 31.2 PG (ref 24–34)
MCHC RBC AUTO-ENTMCNC: 33.3 G/DL (ref 31–37)
MCHC RBC AUTO-ENTMCNC: 33.3 G/DL (ref 31–37)
MCV RBC AUTO: 93 FL (ref 78–100)
MCV RBC AUTO: 93.5 FL (ref 78–100)
MONOCYTES # BLD: 0.7 K/UL (ref 0.05–1.2)
MONOCYTES # BLD: 0.7 K/UL (ref 0.05–1.2)
MONOCYTES NFR BLD: 10 % (ref 3–10)
MONOCYTES NFR BLD: 11 % (ref 3–10)
NEUTS SEG # BLD: 4.8 K/UL (ref 1.8–8)
NEUTS SEG # BLD: 4.9 K/UL (ref 1.8–8)
NEUTS SEG NFR BLD: 74 % (ref 40–73)
NEUTS SEG NFR BLD: 75 % (ref 40–73)
NRBC # BLD: 0 K/UL (ref 0–0.01)
NRBC # BLD: 0 K/UL (ref 0–0.01)
NRBC BLD-RTO: 0 PER 100 WBC
NRBC BLD-RTO: 0 PER 100 WBC
PLATELET # BLD AUTO: 203 K/UL (ref 135–420)
PLATELET # BLD AUTO: 215 K/UL (ref 135–420)
PMV BLD AUTO: 9.5 FL (ref 9.2–11.8)
PMV BLD AUTO: 9.5 FL (ref 9.2–11.8)
POTASSIUM SERPL-SCNC: 3.8 MMOL/L (ref 3.5–5.5)
POTASSIUM SERPL-SCNC: 4.1 MMOL/L (ref 3.5–5.5)
PROT SERPL-MCNC: 5.8 G/DL (ref 6.4–8.2)
PROTHROMBIN TIME: 12 SEC (ref 11.9–14.9)
RBC # BLD AUTO: 3.45 M/UL (ref 4.2–5.3)
RBC # BLD AUTO: 3.53 M/UL (ref 4.2–5.3)
SODIUM SERPL-SCNC: 141 MMOL/L (ref 136–145)
SODIUM SERPL-SCNC: 142 MMOL/L (ref 136–145)
TSH SERPL DL<=0.05 MIU/L-ACNC: 4.6 UIU/ML (ref 0.36–3.74)
VANCOMYCIN SERPL-MCNC: 14.4 UG/ML (ref 5–40)
WBC # BLD AUTO: 6.4 K/UL (ref 4.6–13.2)
WBC # BLD AUTO: 6.5 K/UL (ref 4.6–13.2)

## 2024-08-26 PROCEDURE — 80202 ASSAY OF VANCOMYCIN: CPT

## 2024-08-26 PROCEDURE — 2580000003 HC RX 258: Performed by: INTERNAL MEDICINE

## 2024-08-26 PROCEDURE — 6360000002 HC RX W HCPCS: Performed by: INTERNAL MEDICINE

## 2024-08-26 PROCEDURE — 85025 COMPLETE CBC W/AUTO DIFF WBC: CPT

## 2024-08-26 PROCEDURE — 83735 ASSAY OF MAGNESIUM: CPT

## 2024-08-26 PROCEDURE — 6370000000 HC RX 637 (ALT 250 FOR IP): Performed by: INTERNAL MEDICINE

## 2024-08-26 PROCEDURE — 6370000000 HC RX 637 (ALT 250 FOR IP): Performed by: ORTHOPAEDIC SURGERY

## 2024-08-26 PROCEDURE — 85610 PROTHROMBIN TIME: CPT

## 2024-08-26 PROCEDURE — 6360000002 HC RX W HCPCS: Performed by: ORTHOPAEDIC SURGERY

## 2024-08-26 PROCEDURE — 1100000000 HC RM PRIVATE

## 2024-08-26 PROCEDURE — 85730 THROMBOPLASTIN TIME PARTIAL: CPT

## 2024-08-26 PROCEDURE — 2580000003 HC RX 258: Performed by: ORTHOPAEDIC SURGERY

## 2024-08-26 PROCEDURE — 93306 TTE W/DOPPLER COMPLETE: CPT

## 2024-08-26 PROCEDURE — 80053 COMPREHEN METABOLIC PANEL: CPT

## 2024-08-26 RX ADMIN — OXYCODONE HYDROCHLORIDE 5 MG: 5 TABLET ORAL at 06:21

## 2024-08-26 RX ADMIN — PANTOPRAZOLE SODIUM 40 MG: 40 TABLET, DELAYED RELEASE ORAL at 06:20

## 2024-08-26 RX ADMIN — OXYCODONE HYDROCHLORIDE 5 MG: 5 TABLET ORAL at 12:58

## 2024-08-26 RX ADMIN — Medication 1 TABLET: at 07:58

## 2024-08-26 RX ADMIN — Medication 625 MG: at 07:59

## 2024-08-26 RX ADMIN — ONDANSETRON 4 MG: 2 INJECTION INTRAMUSCULAR; INTRAVENOUS at 06:14

## 2024-08-26 RX ADMIN — ONDANSETRON 4 MG: 2 INJECTION INTRAMUSCULAR; INTRAVENOUS at 12:04

## 2024-08-26 RX ADMIN — SODIUM CHLORIDE, PRESERVATIVE FREE 10 ML: 5 INJECTION INTRAVENOUS at 08:02

## 2024-08-26 RX ADMIN — OXYCODONE HYDROCHLORIDE 5 MG: 5 TABLET ORAL at 03:20

## 2024-08-26 RX ADMIN — ENOXAPARIN SODIUM 40 MG: 100 INJECTION SUBCUTANEOUS at 08:00

## 2024-08-26 RX ADMIN — OXYCODONE HYDROCHLORIDE 5 MG: 5 TABLET ORAL at 22:15

## 2024-08-26 RX ADMIN — PIPERACILLIN AND TAZOBACTAM 3375 MG: 3; .375 INJECTION, POWDER, LYOPHILIZED, FOR SOLUTION INTRAVENOUS at 08:05

## 2024-08-26 RX ADMIN — Medication 625 MG: at 21:34

## 2024-08-26 RX ADMIN — VANCOMYCIN HYDROCHLORIDE 750 MG: 750 INJECTION, POWDER, LYOPHILIZED, FOR SOLUTION INTRAVENOUS at 11:10

## 2024-08-26 RX ADMIN — VANCOMYCIN HYDROCHLORIDE 750 MG: 750 INJECTION, POWDER, LYOPHILIZED, FOR SOLUTION INTRAVENOUS at 21:35

## 2024-08-26 RX ADMIN — ACETAMINOPHEN 1000 MG: 500 TABLET ORAL at 07:58

## 2024-08-26 RX ADMIN — PREDNISONE 4 MG: 1 TABLET ORAL at 07:58

## 2024-08-26 RX ADMIN — PIPERACILLIN AND TAZOBACTAM 3375 MG: 3; .375 INJECTION, POWDER, LYOPHILIZED, FOR SOLUTION INTRAVENOUS at 02:52

## 2024-08-26 ASSESSMENT — PAIN - FUNCTIONAL ASSESSMENT
PAIN_FUNCTIONAL_ASSESSMENT: PREVENTS OR INTERFERES SOME ACTIVE ACTIVITIES AND ADLS
PAIN_FUNCTIONAL_ASSESSMENT: PREVENTS OR INTERFERES WITH MANY ACTIVE NOT PASSIVE ACTIVITIES
PAIN_FUNCTIONAL_ASSESSMENT: PREVENTS OR INTERFERES SOME ACTIVE ACTIVITIES AND ADLS

## 2024-08-26 ASSESSMENT — PAIN DESCRIPTION - LOCATION
LOCATION: KNEE

## 2024-08-26 ASSESSMENT — PAIN DESCRIPTION - ORIENTATION
ORIENTATION: RIGHT

## 2024-08-26 ASSESSMENT — PAIN SCALES - GENERAL
PAINLEVEL_OUTOF10: 0
PAINLEVEL_OUTOF10: 9
PAINLEVEL_OUTOF10: 5
PAINLEVEL_OUTOF10: 10
PAINLEVEL_OUTOF10: 0
PAINLEVEL_OUTOF10: 8

## 2024-08-26 ASSESSMENT — PAIN DESCRIPTION - DESCRIPTORS
DESCRIPTORS: THROBBING
DESCRIPTORS: THROBBING
DESCRIPTORS: ACHING
DESCRIPTORS: THROBBING

## 2024-08-26 ASSESSMENT — PAIN DESCRIPTION - ONSET: ONSET: ON-GOING

## 2024-08-26 ASSESSMENT — PAIN DESCRIPTION - PAIN TYPE
TYPE: ACUTE PAIN

## 2024-08-26 ASSESSMENT — PAIN DESCRIPTION - FREQUENCY: FREQUENCY: INTERMITTENT

## 2024-08-26 NOTE — PROGRESS NOTES
Bedside and Verbal shift change report given to SHABANA Avery (oncoming nurse) by YESSI Frias RN (offgoing nurse). Report included the following information Nurse Handoff Report, Adult Overview, Intake/Output, MAR, and Recent Results.

## 2024-08-26 NOTE — PLAN OF CARE
Problem: Pain  Goal: Verbalizes/displays adequate comfort level or baseline comfort level  8/26/2024 1100 by Torin Frias, RN  Outcome: Progressing  8/26/2024 0123 by Collette River, RN  Outcome: Progressing     Problem: Safety - Adult  Goal: Free from fall injury  8/26/2024 1100 by Torin Frias, RN  Outcome: Progressing  8/26/2024 0123 by Collette River, RN  Outcome: Progressing     Problem: Skin/Tissue Integrity  Goal: Absence of new skin breakdown  Description: 1.  Monitor for areas of redness and/or skin breakdown  2.  Assess vascular access sites hourly  3.  Every 4-6 hours minimum:  Change oxygen saturation probe site  4.  Every 4-6 hours:  If on nasal continuous positive airway pressure, respiratory therapy assess nares and determine need for appliance change or resting period.  8/26/2024 1100 by Torin Frias, RN  Outcome: Progressing  8/26/2024 0123 by Collette River, RN  Outcome: Progressing

## 2024-08-26 NOTE — PROGRESS NOTES
Progress Note      Patient: Lanny Elliott               Sex: female          DOA: 8/24/2024     YOB: 1945      Age:  79 y.o.        LOS:  LOS: 2 days             Subjective:     Lanny Elliott is a 79 y.o. female who c/o right knee pain. History of partial knee replacement 20+ years ago with progressive pain and swelling since March. She was evaluated in ortho office in [place] with aspiration of right knee showing no obvious signs of infection. Seen in office by Dr. Jarrett earlier this month, planning for conversion to total knee arthroplasty 9/10. Patient presented to ER with increasing pain and swelling and transferred to [facility] for appropriate workup. She denies fever/chills. Reports recent UTI last month treated with antibiotics and resolved.    Objective:      Visit Vitals  BP (!) 157/59   Pulse 73   Temp 97.7 °F (36.5 °C) (Oral)   Resp 18   Wt 58 kg (127 lb 13.9 oz)   SpO2 99%   BMI 21.28 kg/m²       Physical Exam:   General: Alert and Oriented X 3  Lungs: No audible wheezing  Cardiovascular: Regular Rate and Rhythm  Abdomen: Soft, nontender in all four quadrants  Gential/Rectal: deferred  Musculoskeletal: Mild effusion noted to right knee with hypersensitivity to touch diffuse. Difficulty with full extension secondary to pain. Flexion to about 90 degrees. Gross sensation and motor otherwise intact. No obvious erythematous change or significant warmth appreciated.      Intake and Output:  Current Shift:  No intake/output data recorded.  Last three shifts:  08/24 1901 - 08/26 0700  In: 900 [P.O.:200; I.V.:600]  Out: -   Voiding Status:  + void without need for Jordan catheter    Lab/Data Reviewed:  Recent Labs     08/26/24  0305   HGB 11.0*   HCT 33.0*      K 4.1   BUN 12     CBC    Lab Results   Component Value Date/Time    WBC 6.4 08/26/2024 03:05 AM    RBC 3.53 (L) 08/26/2024 03:05 AM    Hemoglobin 11.0 (L) 08/26/2024 03:05 AM    Hematocrit 33.0 (L) 08/26/2024 03:05 AM    MCV  93.5 08/26/2024 03:05 AM    MCH 31.2 08/26/2024 03:05 AM    MCHC 33.3 08/26/2024 03:05 AM    RDW 16.5 (H) 08/26/2024 03:05 AM    Platelets 203 08/26/2024 03:05 AM    MPV 9.5 08/26/2024 03:05 AM       Medications Reviewed    Assessment/Plan     Principal Problem:    Septic joint (HCC)  Active Problems:    Temporal arteritis (HCC)    Hypokalemia    HTN (hypertension)  Resolved Problems:    * No resolved hospital problems. *      Right knee swelling and pain w/ history of UKA.  Pending TKA after 9/10 when cleared by rheumatologist  Right knee unlikely source of any infection. Swelling likely a component of degenerative changes in lateral and patellofemoral compartments.  Patient did not want knee aspiration today.  Will plan for surgical arthroscopy with washout tomorrow. Will obtain cultures intraoperatively. Risks and benefits were reviewed  NPO after midnight      The patient's plan of care discussed with Dr. Jarrett today as well and he is in agreement with above.

## 2024-08-26 NOTE — PROGRESS NOTES
Hospitalist Progress Note-critical care note     Patient: Lanny Elliott MRN: [id number]  [facility]: [id number]    YOB: 1945  Age: 79 y.o.  Sex: female    DOA: 8/24/2024 LOS:  LOS: 2 days            Chief complaint: septic joint hypokalemia , htn ,     Assessment/Plan         Active Hospital Problems    Diagnosis Date Noted    Septic joint (HCC) [M00.9] 08/25/2024    Temporal arteritis (HCC) [M31.6] 08/25/2024    Hypokalemia [E87.6] 08/25/2024    HTN (hypertension) [I10] 08/25/2024        Knee effusion/with osteolysis worsening  Plan is for washout Monday or Tuesday   ID consult already on Vanc/zosyn  Follow sed rate/esr  Drop Prednisone to 4mg per patient   Last dose of 8/13 of Actemra   Replacement scheduled for 9/10       Right knee pain with swelling possible septic joint with osteolysis  She is on empiric Zosyn and vancomycin   infectious disease consultation  Plan is for washout tomorrow, lovenox hold for procedure      Temporal arthritis last Actemra dose August 13  Resume prednisone 4 mg daily  Follow sed rate and CRP        Hypokalemia, replaced and resolved   Placed on electrolyte protocol     Hypertension aggravated by pain but not on regular medicines  Hydralazine as needed  EKG August 7 with normal  echo done and report still pending    Subjective I need help to get out of bedpan, pain is better     TIME: E/M Time spent with patient and patient care issues: [] 31-40 mins  [x] 41-49 mins  [] 50 mins or more.      Disposition :tbd,   Review of systems:    General: No fevers or chills.  Cardiovascular: No chest pain or pressure. No palpitations.   Pulmonary: No shortness of breath.   Gastrointestinal: No nausea, vomiting.     Vital signs/Intake and Output:  Visit Vitals  BP (!) 153/52   Pulse 76   Temp 98.1 °F (36.7 °C) (Oral)   Resp 18   Ht 1.651 m (5' 5\")   Wt 57.6 kg (127 lb)   SpO2 97%   BMI 21.13 kg/m²     Current Shift:  08/26 0701 - 08/26 1900  In: -   Out: 200 [Urine:200]  Last three  Electronic Signature by: Yony Jensen MD

## 2024-08-26 NOTE — PROGRESS NOTES
Moderate Risk Nutrition Assessment     Type and Reason for Visit: Initial, Positive Nutrition Screen (MST 1 wt loss)    Nutrition Recommendations/Plan:   Cont Reg diet as ab  Pt refused ONS Ensure Plus does not want   Consider adding MVI w/min daily   Consider scheduled antiemetics before meals if cont w/n/v  Consider checking vit D25 hydroxy level and suppl if low  If no BM consider bowel regimen     Malnutrition Assessment:  Malnutrition Status: At risk for malnutrition (Comment) (does not meet malnutrition criteria at this time but may be at risk given wt trends down since March and adv age)    Nutrition Assessment:  78yo F here for R knee pain and swelling, h/o UKA, pending TKA after 9/10 when cleared by Rheumatology, plan surgical arthroscopy washout tmrw. pmhx: temporal arteritis, HTN, hypokalemia.  spoke with pt today, pt reports thks she with unintentional wt loss since March was 134# now 127# but no decreased appetite was eating well PTA; pt reports eating okay to good since admit but some nausea today.  5% wt loss x 5 months is not significant but trending down if correct.    Estimated Daily Nutrient Needs:  Energy (kcal):  1600kcal/day Weight Used for Energy Requirements: Current     Protein (g):  65g pro/day          Fluid (ml/day):  1600ml fluid/day Method Used for Fluid Requirements: 1 ml/kcal    Nutrition Related Findings:     Wound Type: None    Current Nutrition Therapies:    Diet NPO  ADULT DIET; Regular    Anthropometric Measures:  Height: 165.1 cm (5' 5\")  Current Body Wt: 57.6 kg (126 lb 15.8 oz)   BMI: 21.1 underweight in 78yo F pt but pt does not appear to be underweight on observation    Nutrition Diagnosis:   Unintended weight loss related to catabolic illness as evidenced by  (unintentional 5% wt loss x 5 months not significant but down)    Nutrition Interventions:   Food and/or Nutrient Delivery: Continue Current Diet, Vitamin Supplement, Start Oral Nutrition Supplement  Nutrition

## 2024-08-26 NOTE — PROGRESS NOTES
0730  Assumed care of Pt. Pt is A&O x4. IV is patent and infusing NS@60ml/hr. Pt RLE is swollen and red. Pt states pain is 10/10 and is unable to lift the RLE. Pt states there is some numbness to lower extremities and this is her baseline. Pt denies SOB and chest pain. Bed locked in lowest position, call bell in reach.    0800  Assessment performed-see flowsheet. AM medications given, Pt tolerated well. PRN tylenol given for pain 10/10.     0840  Pt helped onto bedpan. Pt able to void freely. Bettie care performed.     1110  Scheduled medication given, pt tolerated well. Pt states she is nauseous-zofran cannot be given until 12pm.     1204  PRN zofran given, Pt tolerated well.     1258  5mg Roxicodone given for 9/10 pain, Pt tolerated well.     1340  Pt helped onto bedpan. Pt able to void freely. Bettie care performed.    1726  Pt helped onto bedpan. Pt able to void freely. Bettie care performed.

## 2024-08-26 NOTE — PROGRESS NOTES
Bedside and Verbal shift change report given to YESSI Frias RN (oncoming nurse) by LUIS River RN (offgoing nurse). Report included the following information Nurse Handoff Report, Adult Overview, Intake/Output, MAR, and Recent Results.

## 2024-08-26 NOTE — CARE COORDINATION
08/26/24 1141   /Social Work Whiteboard Notes   /Social Work Whiteboard RED 8/26 Sx on 8/27, ID following, and PT/OT post op to determine recs       SW will continue to follow.    Eduardo Vo MSW  Case Management Department

## 2024-08-26 NOTE — PROGRESS NOTES
[facility]   Pharmacy Pharmacokinetic Monitoring Service - Vancomycin    Consulting Provider: Dr. Licea   Indication: Bone and Joint Infection  Target Concentration: Goal AUC/FUNMI 400-600 mg*hr/L  Day of Therapy: 2  Additional Antimicrobials: none    Pertinent Laboratory Values:   Wt Readings from Last 1 Encounters:   08/26/24 57.6 kg (127 lb)     Temp Readings from Last 1 Encounters:   08/26/24 98.1 °F (36.7 °C) (Oral)     Estimated Creatinine Clearance: 78 mL/min (A) (based on SCr of 0.51 mg/dL (L)).  Recent Labs     08/25/24  2353 08/26/24  0305   CREATININE 0.55* 0.51*   BUN 13 12   WBC 6.5 6.4     Recent vancomycin administrations                     vancomycin (VANCOCIN) 750 mg in sodium chloride 0.9 % 250 mL IVPB (vial-mate) (mg) 750 mg New Bag 08/26/24 1110     750 mg New Bag 08/25/24 1725    vancomycin (VANCOCIN) 1500 mg in sodium chloride 0.9% 500 mL IVPB (mg) 1,500 mg New Bag 08/25/24 0356                  Assessment:  Date/Time Current Dose Concentration Timing of Concentration (h) AUC (ss)   8/26/2024 at 13:10 Vancomycin 750 mg IV q12h Random level = 14.4  8/26/24 at 03:05 475 mg/L.hr   Note: Serum concentrations collected for AUC dosing may appear elevated if collected in close proximity to the dose administered, this is not necessarily an indication of toxicity    Plan:  Current dosing regimen is therapeutic  Continue current dose:  Vancomycin 750 mg IV q12h        Est AUC (ss): 475 mg/L.hr   Est trough (ss): 13 mg/L  Pharmacy will continue to monitor patient and adjust therapy as indicated    Thank you for the consult,  Collette Chauhan, PharmD  8/26/2024 1:12 PM

## 2024-08-26 NOTE — CONSULTS
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 Vs mechanical etiology/deg changes.  --BL partial TKA >20 years ago  --Right partial TKA- pain from 03/2023  --increasing swelling, unable to walk from last week.   --denies prior po abx, prior injection to her knee  --joint tap 05/2024-- WBC bloody- 3375- 91% N, no growth. Outside lab  --ESR 1, CRP 0.9--8/25/24    Right poplitieal fossa baker cyst-- 4.6X3.3X2.0 cm - ON PVL    Temporal Arteritis--on immuesuppression    Microlab data:    8/7-MRSA screening-negative       --urine culture negative  8/25- Blood culture X2- NGSF           UA-pyuria    Additional data:    CT KNEE RIGHT W CONTRAST    Result Date: 8/24/2024  CT right knee. COMPARISON: 7/3/2024.   IMPRESSION: Partial right knee replacement. 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 (  Previously identified. Periprosthetic osteolysis within the medial aspect of the proximal tibia has increased in comparison to the previous study, now measuring 3.3 x 2.6 cm in diameter compared to 2.2 x 1.7 cm. There is no evidence acute fracture identified)    Co-morbidities:    TA on immunosuppression-  treatment X1 year. Tociluzumab on hold and steroid tapered down  GERD    Recommendation -- ID related:     It is unclear wether this is degenerative issue or not. Unremarkable ESR/CRP. Given her clinical stable condition, holding off abx may have been possible until surgery preferred, but since she is already 
knee CT 8/24/2024. FINDINGS: Grayscale and color and spectral Doppler imaging performed of right lower extremity. Flow and compressibility with spontaneous and phasic venous waveforms seen in right common femoral, superficial femoral, and popliteal veins. Flow demonstrated in right external iliac, greater saphenous, profunda femoral, gastrocnemius, posterior tibial, and peroneal veins and left external iliac and common femoral veins. 4.6 x 3.3 x 2.0 cm Baker's cyst right popliteal fossa.    IMPRESSION: 1. No evidence of deep venous thrombosis right lower extremity. 2. Moderate right Baker's cyst. Reading Doctor: Sean Pat Electronic Signature by: Sean Pat    CT KNEE RIGHT W CONTRAST    Result Date: 8/24/2024  CT right knee. COMPARISON: 7/3/2024. CLINICAL INFORMATION: Partial right knee replacement. Pain, swelling. FINDINGS: Axial source as well as sagittal and coronal reformatted images were with obtained without IV contrast. A partial right knee replacement is identified involving the medial joint space. Previously identified. Periprosthetic osteolysis within the medial aspect of the proximal tibia has increased in comparison to the previous study, now measuring 3.3 x 2.6 cm in diameter compared to 2.2 x 1.7 cm. There is no evidence acute fracture identified. A suprapatellar joint effusion has decreased in size in comparison to the previous study. A right popliteal cyst has decreased in size in comparison to the previous study.    IMPRESSION: Partial right knee replacement. 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 Reading Doctor: Yony Jensen Electronic Signature by: Yony Jensen    XRAY KNEE 1-2 VIEWS RIGHT    Result Date: 8/24/2024  Right knee. Compared: 11/13/2018. CLINICAL INFORMATION: Pain, swelling. FINDINGS: 2 views of the right knee were obtained. A partial right knee replacement is identified involving the medial joint space, similar comparison to the previous

## 2024-08-27 ENCOUNTER — ANESTHESIA (OUTPATIENT)
Facility: HOSPITAL | Age: 79
End: 2024-08-27
Payer: MEDICARE

## 2024-08-27 LAB
ANION GAP SERPL CALC-SCNC: 7 MMOL/L (ref 3–18)
BACTERIA SPEC CULT: NORMAL
BUN SERPL-MCNC: 7 MG/DL (ref 7–18)
BUN/CREAT SERPL: 13 (ref 12–20)
CALCIUM SERPL-MCNC: 9.5 MG/DL (ref 8.5–10.1)
CHLORIDE SERPL-SCNC: 107 MMOL/L (ref 100–111)
CO2 SERPL-SCNC: 25 MMOL/L (ref 21–32)
CREAT SERPL-MCNC: 0.52 MG/DL (ref 0.6–1.3)
GLUCOSE SERPL-MCNC: 104 MG/DL (ref 74–99)
MAGNESIUM SERPL-MCNC: 1.7 MG/DL (ref 1.6–2.6)
POTASSIUM SERPL-SCNC: 3.6 MMOL/L (ref 3.5–5.5)
SERVICE CMNT-IMP: NORMAL
SODIUM SERPL-SCNC: 139 MMOL/L (ref 136–145)

## 2024-08-27 PROCEDURE — 87102 FUNGUS ISOLATION CULTURE: CPT

## 2024-08-27 PROCEDURE — 2709999900 HC NON-CHARGEABLE SUPPLY: Performed by: ORTHOPAEDIC SURGERY

## 2024-08-27 PROCEDURE — 64447 NJX AA&/STRD FEMORAL NRV IMG: CPT | Performed by: ANESTHESIOLOGY

## 2024-08-27 PROCEDURE — 6370000000 HC RX 637 (ALT 250 FOR IP): Performed by: PHYSICIAN ASSISTANT

## 2024-08-27 PROCEDURE — 6360000002 HC RX W HCPCS: Performed by: ANESTHESIOLOGY

## 2024-08-27 PROCEDURE — 87077 CULTURE AEROBIC IDENTIFY: CPT

## 2024-08-27 PROCEDURE — 6360000002 HC RX W HCPCS: Performed by: ORTHOPAEDIC SURGERY

## 2024-08-27 PROCEDURE — 3700000001 HC ADD 15 MINUTES (ANESTHESIA): Performed by: ORTHOPAEDIC SURGERY

## 2024-08-27 PROCEDURE — 87206 SMEAR FLUORESCENT/ACID STAI: CPT

## 2024-08-27 PROCEDURE — 0SBC4ZZ EXCISION OF RIGHT KNEE JOINT, PERCUTANEOUS ENDOSCOPIC APPROACH: ICD-10-PCS | Performed by: ORTHOPAEDIC SURGERY

## 2024-08-27 PROCEDURE — 0JBN3ZZ EXCISION OF RIGHT LOWER LEG SUBCUTANEOUS TISSUE AND FASCIA, PERCUTANEOUS APPROACH: ICD-10-PCS | Performed by: ORTHOPAEDIC SURGERY

## 2024-08-27 PROCEDURE — 6370000000 HC RX 637 (ALT 250 FOR IP): Performed by: INTERNAL MEDICINE

## 2024-08-27 PROCEDURE — 87186 SC STD MICRODIL/AGAR DIL: CPT

## 2024-08-27 PROCEDURE — 2500000003 HC RX 250 WO HCPCS: Performed by: HOSPITALIST

## 2024-08-27 PROCEDURE — 87070 CULTURE OTHR SPECIMN AEROBIC: CPT

## 2024-08-27 PROCEDURE — 87176 TISSUE HOMOGENIZATION CULTR: CPT

## 2024-08-27 PROCEDURE — 2580000003 HC RX 258: Performed by: ORTHOPAEDIC SURGERY

## 2024-08-27 PROCEDURE — 3600000012 HC SURGERY LEVEL 2 ADDTL 15MIN: Performed by: ORTHOPAEDIC SURGERY

## 2024-08-27 PROCEDURE — 6360000002 HC RX W HCPCS: Performed by: INTERNAL MEDICINE

## 2024-08-27 PROCEDURE — 6360000002 HC RX W HCPCS: Performed by: NURSE ANESTHETIST, CERTIFIED REGISTERED

## 2024-08-27 PROCEDURE — 6370000000 HC RX 637 (ALT 250 FOR IP): Performed by: ORTHOPAEDIC SURGERY

## 2024-08-27 PROCEDURE — 80048 BASIC METABOLIC PNL TOTAL CA: CPT

## 2024-08-27 PROCEDURE — 7100000001 HC PACU RECOVERY - ADDTL 15 MIN: Performed by: ORTHOPAEDIC SURGERY

## 2024-08-27 PROCEDURE — 3600000002 HC SURGERY LEVEL 2 BASE: Performed by: ORTHOPAEDIC SURGERY

## 2024-08-27 PROCEDURE — 87075 CULTR BACTERIA EXCEPT BLOOD: CPT

## 2024-08-27 PROCEDURE — 0S9C4ZZ DRAINAGE OF RIGHT KNEE JOINT, PERCUTANEOUS ENDOSCOPIC APPROACH: ICD-10-PCS | Performed by: ORTHOPAEDIC SURGERY

## 2024-08-27 PROCEDURE — 2500000003 HC RX 250 WO HCPCS: Performed by: NURSE ANESTHETIST, CERTIFIED REGISTERED

## 2024-08-27 PROCEDURE — 87801 DETECT AGNT MULT DNA AMPLI: CPT

## 2024-08-27 PROCEDURE — 7100000000 HC PACU RECOVERY - FIRST 15 MIN: Performed by: ORTHOPAEDIC SURGERY

## 2024-08-27 PROCEDURE — 1100000000 HC RM PRIVATE

## 2024-08-27 PROCEDURE — 6360000002 HC RX W HCPCS: Performed by: HOSPITALIST

## 2024-08-27 PROCEDURE — 2580000003 HC RX 258: Performed by: NURSE ANESTHETIST, CERTIFIED REGISTERED

## 2024-08-27 PROCEDURE — 3700000000 HC ANESTHESIA ATTENDED CARE: Performed by: ORTHOPAEDIC SURGERY

## 2024-08-27 PROCEDURE — 2580000003 HC RX 258: Performed by: PHYSICIAN ASSISTANT

## 2024-08-27 PROCEDURE — 87116 MYCOBACTERIA CULTURE: CPT

## 2024-08-27 PROCEDURE — 87205 SMEAR GRAM STAIN: CPT

## 2024-08-27 PROCEDURE — 83735 ASSAY OF MAGNESIUM: CPT

## 2024-08-27 PROCEDURE — 2500000003 HC RX 250 WO HCPCS: Performed by: ORTHOPAEDIC SURGERY

## 2024-08-27 PROCEDURE — 36415 COLL VENOUS BLD VENIPUNCTURE: CPT

## 2024-08-27 RX ORDER — SODIUM CHLORIDE 9 MG/ML
INJECTION, SOLUTION INTRAVENOUS PRN
Status: DISCONTINUED | OUTPATIENT
Start: 2024-08-27 | End: 2024-08-27 | Stop reason: HOSPADM

## 2024-08-27 RX ORDER — HYDRALAZINE HYDROCHLORIDE 20 MG/ML
10 INJECTION INTRAMUSCULAR; INTRAVENOUS ONCE
Status: COMPLETED | OUTPATIENT
Start: 2024-08-27 | End: 2024-08-27

## 2024-08-27 RX ORDER — DEXAMETHASONE SODIUM PHOSPHATE 4 MG/ML
INJECTION, SOLUTION INTRA-ARTICULAR; INTRALESIONAL; INTRAMUSCULAR; INTRAVENOUS; SOFT TISSUE PRN
Status: DISCONTINUED | OUTPATIENT
Start: 2024-08-27 | End: 2024-08-27 | Stop reason: SDUPTHER

## 2024-08-27 RX ORDER — LABETALOL HYDROCHLORIDE 5 MG/ML
10 INJECTION, SOLUTION INTRAVENOUS ONCE
Status: COMPLETED | OUTPATIENT
Start: 2024-08-27 | End: 2024-08-27

## 2024-08-27 RX ORDER — SODIUM CHLORIDE, SODIUM LACTATE, POTASSIUM CHLORIDE, CALCIUM CHLORIDE 600; 310; 30; 20 MG/100ML; MG/100ML; MG/100ML; MG/100ML
INJECTION, SOLUTION INTRAVENOUS CONTINUOUS
Status: DISCONTINUED | OUTPATIENT
Start: 2024-08-27 | End: 2024-08-27 | Stop reason: HOSPADM

## 2024-08-27 RX ORDER — ONDANSETRON 2 MG/ML
4 INJECTION INTRAMUSCULAR; INTRAVENOUS
Status: COMPLETED | OUTPATIENT
Start: 2024-08-27 | End: 2024-08-27

## 2024-08-27 RX ORDER — NALOXONE HYDROCHLORIDE 0.4 MG/ML
INJECTION, SOLUTION INTRAMUSCULAR; INTRAVENOUS; SUBCUTANEOUS PRN
Status: DISCONTINUED | OUTPATIENT
Start: 2024-08-27 | End: 2024-08-27 | Stop reason: HOSPADM

## 2024-08-27 RX ORDER — ACETAMINOPHEN 325 MG/1
650 TABLET ORAL EVERY 6 HOURS
Status: DISCONTINUED | OUTPATIENT
Start: 2024-08-27 | End: 2024-09-06

## 2024-08-27 RX ORDER — SODIUM CHLORIDE 0.9 % (FLUSH) 0.9 %
5-40 SYRINGE (ML) INJECTION PRN
Status: DISCONTINUED | OUTPATIENT
Start: 2024-08-27 | End: 2024-08-27 | Stop reason: HOSPADM

## 2024-08-27 RX ORDER — PROPOFOL 10 MG/ML
INJECTION, EMULSION INTRAVENOUS PRN
Status: DISCONTINUED | OUTPATIENT
Start: 2024-08-27 | End: 2024-08-27 | Stop reason: SDUPTHER

## 2024-08-27 RX ORDER — CEFAZOLIN SODIUM 1 G/3ML
INJECTION, POWDER, FOR SOLUTION INTRAMUSCULAR; INTRAVENOUS PRN
Status: DISCONTINUED | OUTPATIENT
Start: 2024-08-27 | End: 2024-08-27 | Stop reason: SDUPTHER

## 2024-08-27 RX ORDER — MAGNESIUM SULFATE HEPTAHYDRATE 40 MG/ML
2000 INJECTION, SOLUTION INTRAVENOUS ONCE
Status: COMPLETED | OUTPATIENT
Start: 2024-08-27 | End: 2024-08-27

## 2024-08-27 RX ORDER — ROPIVACAINE HYDROCHLORIDE 5 MG/ML
INJECTION, SOLUTION EPIDURAL; INFILTRATION; PERINEURAL
Status: COMPLETED | OUTPATIENT
Start: 2024-08-27 | End: 2024-08-27

## 2024-08-27 RX ORDER — SODIUM CHLORIDE 9 MG/ML
INJECTION, SOLUTION INTRAVENOUS PRN
Status: DISCONTINUED | OUTPATIENT
Start: 2024-08-27 | End: 2024-09-07 | Stop reason: HOSPADM

## 2024-08-27 RX ORDER — OXYCODONE HYDROCHLORIDE 5 MG/1
10 TABLET ORAL EVERY 4 HOURS PRN
Status: DISCONTINUED | OUTPATIENT
Start: 2024-08-27 | End: 2024-09-07 | Stop reason: HOSPADM

## 2024-08-27 RX ORDER — FENTANYL CITRATE 50 UG/ML
25 INJECTION, SOLUTION INTRAMUSCULAR; INTRAVENOUS EVERY 5 MIN PRN
Status: DISCONTINUED | OUTPATIENT
Start: 2024-08-27 | End: 2024-08-27 | Stop reason: HOSPADM

## 2024-08-27 RX ORDER — ONDANSETRON 2 MG/ML
INJECTION INTRAMUSCULAR; INTRAVENOUS PRN
Status: DISCONTINUED | OUTPATIENT
Start: 2024-08-27 | End: 2024-08-27 | Stop reason: SDUPTHER

## 2024-08-27 RX ORDER — SODIUM CHLORIDE, SODIUM LACTATE, POTASSIUM CHLORIDE, CALCIUM CHLORIDE 600; 310; 30; 20 MG/100ML; MG/100ML; MG/100ML; MG/100ML
INJECTION, SOLUTION INTRAVENOUS CONTINUOUS PRN
Status: DISCONTINUED | OUTPATIENT
Start: 2024-08-27 | End: 2024-08-27

## 2024-08-27 RX ORDER — LABETALOL HYDROCHLORIDE 5 MG/ML
10 INJECTION, SOLUTION INTRAVENOUS
Status: COMPLETED | OUTPATIENT
Start: 2024-08-27 | End: 2024-08-27

## 2024-08-27 RX ORDER — MEPERIDINE HYDROCHLORIDE 50 MG/ML
12.5 INJECTION INTRAMUSCULAR; INTRAVENOUS; SUBCUTANEOUS ONCE
Status: DISCONTINUED | OUTPATIENT
Start: 2024-08-27 | End: 2024-08-27 | Stop reason: HOSPADM

## 2024-08-27 RX ORDER — MIDAZOLAM HYDROCHLORIDE 2 MG/2ML
INJECTION, SOLUTION INTRAMUSCULAR; INTRAVENOUS
Status: COMPLETED
Start: 2024-08-27 | End: 2024-08-27

## 2024-08-27 RX ORDER — SODIUM CHLORIDE, SODIUM LACTATE, POTASSIUM CHLORIDE, CALCIUM CHLORIDE 600; 310; 30; 20 MG/100ML; MG/100ML; MG/100ML; MG/100ML
INJECTION, SOLUTION INTRAVENOUS CONTINUOUS
Status: DISCONTINUED | OUTPATIENT
Start: 2024-08-27 | End: 2024-09-02

## 2024-08-27 RX ORDER — DIPHENHYDRAMINE HYDROCHLORIDE 50 MG/ML
12.5 INJECTION INTRAMUSCULAR; INTRAVENOUS
Status: DISCONTINUED | OUTPATIENT
Start: 2024-08-27 | End: 2024-08-27 | Stop reason: HOSPADM

## 2024-08-27 RX ORDER — ESMOLOL HYDROCHLORIDE 10 MG/ML
INJECTION INTRAVENOUS PRN
Status: DISCONTINUED | OUTPATIENT
Start: 2024-08-27 | End: 2024-08-27 | Stop reason: SDUPTHER

## 2024-08-27 RX ORDER — DOCUSATE SODIUM 100 MG/1
100 CAPSULE, LIQUID FILLED ORAL 2 TIMES DAILY PRN
Status: DISCONTINUED | OUTPATIENT
Start: 2024-08-27 | End: 2024-09-07 | Stop reason: HOSPADM

## 2024-08-27 RX ORDER — OXYCODONE HYDROCHLORIDE 5 MG/1
5 TABLET ORAL EVERY 4 HOURS PRN
Status: DISCONTINUED | OUTPATIENT
Start: 2024-08-27 | End: 2024-09-07 | Stop reason: HOSPADM

## 2024-08-27 RX ORDER — LORAZEPAM 2 MG/ML
0.5 INJECTION INTRAMUSCULAR ONCE
Status: DISCONTINUED | OUTPATIENT
Start: 2024-08-27 | End: 2024-08-27 | Stop reason: HOSPADM

## 2024-08-27 RX ORDER — OXYCODONE HYDROCHLORIDE 5 MG/1
5 TABLET ORAL PRN
Status: DISCONTINUED | OUTPATIENT
Start: 2024-08-27 | End: 2024-08-27 | Stop reason: HOSPADM

## 2024-08-27 RX ORDER — OXYCODONE HYDROCHLORIDE 5 MG/1
10 TABLET ORAL PRN
Status: DISCONTINUED | OUTPATIENT
Start: 2024-08-27 | End: 2024-08-27 | Stop reason: HOSPADM

## 2024-08-27 RX ORDER — SODIUM CHLORIDE, SODIUM LACTATE, POTASSIUM CHLORIDE, CALCIUM CHLORIDE 600; 310; 30; 20 MG/100ML; MG/100ML; MG/100ML; MG/100ML
INJECTION, SOLUTION INTRAVENOUS CONTINUOUS PRN
Status: DISCONTINUED | OUTPATIENT
Start: 2024-08-27 | End: 2024-08-27 | Stop reason: SDUPTHER

## 2024-08-27 RX ORDER — HYDROMORPHONE HYDROCHLORIDE 1 MG/ML
0.5 INJECTION, SOLUTION INTRAMUSCULAR; INTRAVENOUS; SUBCUTANEOUS EVERY 5 MIN PRN
Status: DISCONTINUED | OUTPATIENT
Start: 2024-08-27 | End: 2024-08-27 | Stop reason: HOSPADM

## 2024-08-27 RX ORDER — SODIUM CHLORIDE 0.9 % (FLUSH) 0.9 %
5-40 SYRINGE (ML) INJECTION EVERY 12 HOURS SCHEDULED
Status: DISCONTINUED | OUTPATIENT
Start: 2024-08-27 | End: 2024-08-27 | Stop reason: HOSPADM

## 2024-08-27 RX ORDER — HYDRALAZINE HYDROCHLORIDE 20 MG/ML
10 INJECTION INTRAMUSCULAR; INTRAVENOUS
Status: COMPLETED | OUTPATIENT
Start: 2024-08-27 | End: 2024-08-27

## 2024-08-27 RX ORDER — SODIUM CHLORIDE 0.9 % (FLUSH) 0.9 %
5-40 SYRINGE (ML) INJECTION EVERY 12 HOURS SCHEDULED
Status: DISCONTINUED | OUTPATIENT
Start: 2024-08-27 | End: 2024-09-07 | Stop reason: HOSPADM

## 2024-08-27 RX ORDER — SODIUM CHLORIDE 0.9 % (FLUSH) 0.9 %
5-40 SYRINGE (ML) INJECTION PRN
Status: DISCONTINUED | OUTPATIENT
Start: 2024-08-27 | End: 2024-09-07 | Stop reason: HOSPADM

## 2024-08-27 RX ORDER — MIDAZOLAM HYDROCHLORIDE 1 MG/ML
INJECTION INTRAMUSCULAR; INTRAVENOUS
Status: COMPLETED | OUTPATIENT
Start: 2024-08-27 | End: 2024-08-27

## 2024-08-27 RX ORDER — LIDOCAINE HYDROCHLORIDE 20 MG/ML
INJECTION, SOLUTION EPIDURAL; INFILTRATION; INTRACAUDAL; PERINEURAL PRN
Status: DISCONTINUED | OUTPATIENT
Start: 2024-08-27 | End: 2024-08-27 | Stop reason: SDUPTHER

## 2024-08-27 RX ORDER — POTASSIUM CHLORIDE 7.45 MG/ML
10 INJECTION INTRAVENOUS
Status: DISPENSED | OUTPATIENT
Start: 2024-08-27 | End: 2024-08-27

## 2024-08-27 RX ORDER — FENTANYL CITRATE 50 UG/ML
INJECTION, SOLUTION INTRAMUSCULAR; INTRAVENOUS PRN
Status: DISCONTINUED | OUTPATIENT
Start: 2024-08-27 | End: 2024-08-27 | Stop reason: SDUPTHER

## 2024-08-27 RX ORDER — DROPERIDOL 2.5 MG/ML
0.62 INJECTION, SOLUTION INTRAMUSCULAR; INTRAVENOUS
Status: DISCONTINUED | OUTPATIENT
Start: 2024-08-27 | End: 2024-08-27 | Stop reason: HOSPADM

## 2024-08-27 RX ADMIN — FENTANYL CITRATE 100 MCG: 50 INJECTION, SOLUTION INTRAMUSCULAR; INTRAVENOUS at 15:35

## 2024-08-27 RX ADMIN — LABETALOL HYDROCHLORIDE 10 MG: 5 INJECTION INTRAVENOUS at 17:25

## 2024-08-27 RX ADMIN — VANCOMYCIN HYDROCHLORIDE 750 MG: 750 INJECTION, POWDER, LYOPHILIZED, FOR SOLUTION INTRAVENOUS at 22:06

## 2024-08-27 RX ADMIN — LABETALOL HYDROCHLORIDE 10 MG: 5 INJECTION INTRAVENOUS at 16:53

## 2024-08-27 RX ADMIN — ACETAMINOPHEN 650 MG: 325 TABLET ORAL at 20:24

## 2024-08-27 RX ADMIN — OXYCODONE HYDROCHLORIDE 5 MG: 5 TABLET ORAL at 04:52

## 2024-08-27 RX ADMIN — DEXAMETHASONE SODIUM PHOSPHATE 4 MG: 4 INJECTION INTRA-ARTICULAR; INTRALESIONAL; INTRAMUSCULAR; INTRAVENOUS; SOFT TISSUE at 15:46

## 2024-08-27 RX ADMIN — ACETAMINOPHEN 1000 MG: 500 TABLET ORAL at 08:46

## 2024-08-27 RX ADMIN — SODIUM CHLORIDE: 9 INJECTION, SOLUTION INTRAVENOUS at 06:40

## 2024-08-27 RX ADMIN — LABETALOL HYDROCHLORIDE 10 MG: 5 INJECTION INTRAVENOUS at 16:28

## 2024-08-27 RX ADMIN — MAGNESIUM SULFATE HEPTAHYDRATE 2000 MG: 40 INJECTION, SOLUTION INTRAVENOUS at 12:50

## 2024-08-27 RX ADMIN — HYDRALAZINE HYDROCHLORIDE 10 MG: 20 INJECTION, SOLUTION INTRAMUSCULAR; INTRAVENOUS at 17:57

## 2024-08-27 RX ADMIN — ONDANSETRON 4 MG: 2 INJECTION INTRAMUSCULAR; INTRAVENOUS at 01:56

## 2024-08-27 RX ADMIN — VANCOMYCIN HYDROCHLORIDE 750 MG: 750 INJECTION, POWDER, LYOPHILIZED, FOR SOLUTION INTRAVENOUS at 11:10

## 2024-08-27 RX ADMIN — OXYCODONE HYDROCHLORIDE 5 MG: 5 TABLET ORAL at 11:26

## 2024-08-27 RX ADMIN — ONDANSETRON 4 MG: 2 INJECTION INTRAMUSCULAR; INTRAVENOUS at 15:46

## 2024-08-27 RX ADMIN — Medication 625 MG: at 20:24

## 2024-08-27 RX ADMIN — FENTANYL CITRATE 25 MCG: 50 INJECTION INTRAMUSCULAR; INTRAVENOUS at 17:09

## 2024-08-27 RX ADMIN — Medication 625 MG: at 08:34

## 2024-08-27 RX ADMIN — SODIUM CHLORIDE, SODIUM LACTATE, POTASSIUM CHLORIDE, AND CALCIUM CHLORIDE: 600; 310; 30; 20 INJECTION, SOLUTION INTRAVENOUS at 16:11

## 2024-08-27 RX ADMIN — ROPIVACAINE HYDROCHLORIDE 20 ML: 5 INJECTION, SOLUTION EPIDURAL; INFILTRATION; PERINEURAL at 15:01

## 2024-08-27 RX ADMIN — MIDAZOLAM 2 MG: 1 INJECTION INTRAMUSCULAR; INTRAVENOUS at 15:01

## 2024-08-27 RX ADMIN — PREDNISONE 4 MG: 1 TABLET ORAL at 20:25

## 2024-08-27 RX ADMIN — HYDRALAZINE HYDROCHLORIDE 10 MG: 20 INJECTION INTRAMUSCULAR; INTRAVENOUS at 00:36

## 2024-08-27 RX ADMIN — ONDANSETRON 4 MG: 2 INJECTION INTRAMUSCULAR; INTRAVENOUS at 11:19

## 2024-08-27 RX ADMIN — SODIUM CHLORIDE, PRESERVATIVE FREE 10 ML: 5 INJECTION INTRAVENOUS at 20:25

## 2024-08-27 RX ADMIN — ESMOLOL HYDROCHLORIDE 20 MG: 100 INJECTION, SOLUTION INTRAVENOUS at 15:49

## 2024-08-27 RX ADMIN — HYDROMORPHONE HYDROCHLORIDE 0.5 MG: 1 INJECTION, SOLUTION INTRAMUSCULAR; INTRAVENOUS; SUBCUTANEOUS at 16:11

## 2024-08-27 RX ADMIN — ONDANSETRON 4 MG: 2 INJECTION INTRAMUSCULAR; INTRAVENOUS at 16:42

## 2024-08-27 RX ADMIN — CEFAZOLIN 2 G: 1 INJECTION, POWDER, FOR SOLUTION INTRAMUSCULAR; INTRAVENOUS at 15:33

## 2024-08-27 RX ADMIN — SODIUM CHLORIDE, PRESERVATIVE FREE 10 ML: 5 INJECTION INTRAVENOUS at 20:32

## 2024-08-27 RX ADMIN — SODIUM CHLORIDE, POTASSIUM CHLORIDE, SODIUM LACTATE AND CALCIUM CHLORIDE: 600; 310; 30; 20 INJECTION, SOLUTION INTRAVENOUS at 21:33

## 2024-08-27 RX ADMIN — ESMOLOL HYDROCHLORIDE 20 MG: 100 INJECTION, SOLUTION INTRAVENOUS at 16:11

## 2024-08-27 RX ADMIN — SODIUM CHLORIDE, SODIUM LACTATE, POTASSIUM CHLORIDE, AND CALCIUM CHLORIDE: 600; 310; 30; 20 INJECTION, SOLUTION INTRAVENOUS at 15:18

## 2024-08-27 RX ADMIN — POTASSIUM CHLORIDE 10 MEQ: 7.46 INJECTION, SOLUTION INTRAVENOUS at 12:51

## 2024-08-27 RX ADMIN — LIDOCAINE HYDROCHLORIDE 40 MG: 20 INJECTION, SOLUTION EPIDURAL; INFILTRATION; INTRACAUDAL; PERINEURAL at 15:21

## 2024-08-27 RX ADMIN — SODIUM CHLORIDE, PRESERVATIVE FREE 10 ML: 5 INJECTION INTRAVENOUS at 08:37

## 2024-08-27 RX ADMIN — HYDROMORPHONE HYDROCHLORIDE 0.5 MG: 1 INJECTION, SOLUTION INTRAMUSCULAR; INTRAVENOUS; SUBCUTANEOUS at 16:01

## 2024-08-27 RX ADMIN — PROPOFOL 100 MG: 10 INJECTION, EMULSION INTRAVENOUS at 15:21

## 2024-08-27 ASSESSMENT — PAIN DESCRIPTION - LOCATION
LOCATION: HEAD
LOCATION: HEAD
LOCATION: KNEE;HEAD
LOCATION: KNEE
LOCATION: LEG
LOCATION: KNEE
LOCATION: LEG
LOCATION: HEAD

## 2024-08-27 ASSESSMENT — PAIN SCALES - GENERAL
PAINLEVEL_OUTOF10: 10
PAINLEVEL_OUTOF10: 0
PAINLEVEL_OUTOF10: 5
PAINLEVEL_OUTOF10: 10
PAINLEVEL_OUTOF10: 6
PAINLEVEL_OUTOF10: 0
PAINLEVEL_OUTOF10: 7
PAINLEVEL_OUTOF10: 10

## 2024-08-27 ASSESSMENT — PAIN DESCRIPTION - DESCRIPTORS
DESCRIPTORS: ACHING

## 2024-08-27 ASSESSMENT — PAIN DESCRIPTION - ORIENTATION
ORIENTATION: RIGHT

## 2024-08-27 ASSESSMENT — PAIN DESCRIPTION - FREQUENCY
FREQUENCY: INTERMITTENT
FREQUENCY: CONTINUOUS

## 2024-08-27 ASSESSMENT — PAIN DESCRIPTION - PAIN TYPE
TYPE: ACUTE PAIN
TYPE: SURGICAL PAIN
TYPE: ACUTE PAIN
TYPE: SURGICAL PAIN

## 2024-08-27 ASSESSMENT — PAIN DESCRIPTION - ONSET
ONSET: ON-GOING

## 2024-08-27 ASSESSMENT — PAIN - FUNCTIONAL ASSESSMENT
PAIN_FUNCTIONAL_ASSESSMENT: ACTIVITIES ARE NOT PREVENTED
PAIN_FUNCTIONAL_ASSESSMENT: ACTIVITIES ARE NOT PREVENTED
PAIN_FUNCTIONAL_ASSESSMENT: PREVENTS OR INTERFERES SOME ACTIVE ACTIVITIES AND ADLS
PAIN_FUNCTIONAL_ASSESSMENT: PREVENTS OR INTERFERES SOME ACTIVE ACTIVITIES AND ADLS
PAIN_FUNCTIONAL_ASSESSMENT: ACTIVITIES ARE NOT PREVENTED

## 2024-08-27 NOTE — PROGRESS NOTES
Progress Note      Patient: Lanny Elliott               Sex: female          DOA: 8/24/2024     YOB: 1945      Age:  79 y.o.        LOS:  LOS: 3 days     Subjective:     Continued right knee pain and swelling. No new changes. Planning for arthroscopic washout today.    Objective:      Visit Vitals  BP (!) 160/63   Pulse 81   Temp 99.3 °F (37.4 °C) (Oral)   Resp 18   Ht 1.651 m (5' 5\")   Wt 57.6 kg (127 lb)   SpO2 99%   BMI 21.13 kg/m²       Physical Exam:   General: Alert and Oriented X 3  Lungs: No audible wheezing  Cardiovascular: Regular Rate and Rhythm  Abdomen: Soft, nontender in all four quadrants  Gential/Rectal: deferred  Musculoskeletal: Continued effusion of right knee with hypersensitivity to touch diffuse. Difficulty with full extension secondary to pain.Gross sensation and motor otherwise intact. No obvious erythematous change or significant warmth appreciated.      Intake and Output:  Current Shift:  No intake/output data recorded.  Last three shifts:  08/25 1901 - 08/27 0700  In: 1200 [P.O.:500; I.V.:600]  Out: 2050 [Urine:2050]  Voiding Status:  + void without need for Jordan catheter    Lab/Data Reviewed:  Recent Labs     08/26/24  0305 08/27/24  0208   HGB 11.0*  --    HCT 33.0*  --     139   K 4.1 3.6   BUN 12 7         Medications Reviewed    Assessment/Plan     Principal Problem:    Septic joint (HCC)  Active Problems:    Temporal arteritis (HCC)    Hypokalemia    HTN (hypertension)  Resolved Problems:    * No resolved hospital problems. *    Right knee pain and swelling likely more mechanical related to degenerative joint changes.  NPO  Plan for surgical arthroscopic washout today. Will obtain cultures intraoperatively        The patient's plan of care discussed with Dr. Jarrett today as well and he is in agreement with above.

## 2024-08-27 NOTE — CARE COORDINATION
Care management specialist visited patient in room to review and discuss second IMM , patient acknowledge understanding and rights to appeal , copy of letter given to patient in room and original placed in patients chart.

## 2024-08-27 NOTE — ANESTHESIA POSTPROCEDURE EVALUATION
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, initial encounter ([facility]) [T84.53XA])    Surgeons: David Jarrett DO Responsible Provider: Scar Beard DO    Anesthesia Type: General, Regional ASA Status: 2            Anesthesia Type: General, Regional    Kenyon Phase I: Kenyon Score: 10    Kenyon Phase II:      Anesthesia Post Evaluation    Patient location during evaluation: PACU  Patient participation: complete - patient participated  Level of consciousness: awake and alert  Pain score: 2  Airway patency: patent  Nausea & Vomiting: no nausea and no vomiting  Cardiovascular status: blood pressure returned to baseline  Respiratory status: acceptable  Hydration status: euvolemic  Multimodal analgesia pain management approach  Pain management: adequate    No notable events documented.

## 2024-08-27 NOTE — PROGRESS NOTES
Chart reviewed  OR today-- will follow see her after surgery tomorrow    ID plans to be made-- based on OR finding, surgical procedure done as per surgeon    Bri Diana MD  [facility]  Office: [phone] -Option #8  Office fax:  [phone]

## 2024-08-27 NOTE — PROGRESS NOTES
Bedside and Verbal shift change report given to Shalonda DONALD (oncoming nurse) by Marita DONALD (offgoing nurse). Report included the following information Nurse Handoff Report, Adult Overview, Surgery Report, Intake/Output, MAR, Recent Results, and Med Rec Status.

## 2024-08-27 NOTE — PROGRESS NOTES
[facility]   Pharmacy Pharmacokinetic Monitoring Service - Vancomycin    Consulting Provider: Dr. Licea   Indication: Bone and Joint Infection  Target Concentration: Goal AUC/FUNMI 400-600 mg*hr/L  Day of Therapy: 3  Additional Antimicrobials: none    Pertinent Laboratory Values:   Wt Readings from Last 1 Encounters:   08/26/24 57.6 kg (127 lb)     Temp Readings from Last 1 Encounters:   08/27/24 98 °F (36.7 °C) (Oral)     Estimated Creatinine Clearance: 76 mL/min (A) (based on SCr of 0.52 mg/dL (L)).  Recent Labs     08/25/24  2353 08/26/24  0305 08/27/24  0208   CREATININE 0.55* 0.51* 0.52*   BUN 13 12 7   WBC 6.5 6.4  --      MRSA Nasal Swab: negative on 8/7/24    Recent vancomycin administrations                     vancomycin (VANCOCIN) 750 mg in sodium chloride 0.9 % 250 mL IVPB (vial-mate) (mg) 750 mg New Bag 08/27/24 1110     750 mg New Bag 08/26/24 2135     750 mg New Bag  1110     750 mg New Bag 08/25/24 1725    vancomycin (VANCOCIN) 1500 mg in sodium chloride 0.9% 500 mL IVPB (mg) 1,500 mg New Bag 08/25/24 0356                Plan:  Current dosing regimen is therapeutic  Continue current dose:  Vancomycin 750 mg IV q12h         Est AUC (ss): 485 mg/L.hr   Est trough (ss): 13.4 mg/L  Pharmacy will continue to monitor patient and adjust therapy as indicated    Thank you for the consult,  Collette Chauhan, PharmD  8/27/2024 12:29 PM

## 2024-08-27 NOTE — PROGRESS NOTES
TRANSFER - OUT REPORT:    [facility]ita [facility] for ordered procedure       Report consisted of patient's Situation, Background, Assessment and   Recommendations(SBAR).     Information from the following report(s) Nurse Handoff Report, Adult Overview, Intake/Output, MAR, Recent Results, and Med Rec Status was reviewed with the receiving nurse.           Lines:   Peripheral IV 08/24/24 Left Antecubital (Active)   Site Assessment Clean, dry & intact 08/27/24 1635   Line Status Infusing 08/27/24 1635   Line Care Connections checked and tightened 08/27/24 1635   Phlebitis Assessment No symptoms 08/27/24 1635   Infiltration Assessment 0 08/27/24 1635   Alcohol Cap Used No 08/27/24 1635   Dressing Status Clean, dry & intact 08/27/24 1635   Dressing Type Transparent 08/27/24 1635   Dressing Intervention New 08/27/24 1635       Peripheral IV 08/25/24 Posterior;Right Forearm (Active)   Site Assessment Clean, dry & intact 08/27/24 1635   Line Status Normal saline locked 08/27/24 1635   Line Care Connections checked and tightened 08/27/24 1635   Phlebitis Assessment No symptoms 08/27/24 1635   Infiltration Assessment 0 08/27/24 1635   Alcohol Cap Used No 08/27/24 1635   Dressing Status Clean, dry & intact 08/27/24 1635   Dressing Type Transparent 08/27/24 1635   Dressing Intervention New 08/27/24 1635        Opportunity for questions and clarification was provided.      Patient transported with:  Tech

## 2024-08-27 NOTE — PROGRESS NOTES
1916- Bedside and Verbal shift change report given to SHABANA Avery (oncoming nurse) by SHABANA Harkins (offgoing nurse). Report included the following information Nurse Handoff Report, Surgery Report, Intake/Output, MAR, and Recent Results.      1927- Shift assessment done as per flow sheet.    2330- Instructed pt regarding  diet, NPO post midnight as preparation for scheduled surgery tomorrow.      0100- CHG wipes, changed of gown and linens done.     0429- Reached out to Dr. Suarez in regards to if the pt can take pain pill with sips of water ( made her aware that pt is for surgery today at 1345), she said, pt can take the prn Roxicodone tab with sips of water.     0796- Bedside and Verbal shift change report given to SHABANA Kirkland (oncoming nurse) by SHABANA Avery (offgoing nurse). Report included the following information Nurse Handoff Report, Adult Overview, Intake/Output, MAR, and Recent Results.

## 2024-08-27 NOTE — PROGRESS NOTES
conducted an initial consultation and spiritual assessment for Lanny Elliott, who is a 79 y.o.,female.     Patient has AMD at home and is going for knee surgery today. She was very appreciative of the visit and we did pray together.    Initiated a relationship of care and support.   Explored issues of adebayo, belief, spirituality and Mandaeism/ritual needs.  Listened empathically.  Provided information about Spiritual Care Services.   confirmed Patient's Islam Affiliation.  Patient processed feelings about current hospitalization.  Offered prayer and assurance of continued prayers on patients behalf.   Chart reviewed.  Patient expressed gratitude for Spiritual Care visit.    Chaplains will continue to follow and will provide pastoral care as needed or requested.    recommends bedside caregivers page  on duty if patient shows signs of acute spiritual or emotional distress.    Sister Krysten Gonzalez MA, [facility]     Spiritual Care  [phone]

## 2024-08-27 NOTE — PERIOP NOTE
TRANSFER - IN REPORT:    Verbal report received from Janette DONALD on [facility]  being received from [address] [place] [address] for ordered procedure      Report consisted of patient's Situation, Background, Assessment and   Recommendations(SBAR).     Information from the following report(s) Nurse Handoff Report, Adult Overview, Surgery Report, Intake/Output, MAR, and Recent Results was reviewed with the receiving nurse.    Opportunity for questions and clarification was provided.      Assessment completed upon patient's arrival to unit and care assumed.

## 2024-08-27 NOTE — ANESTHESIA PROCEDURE NOTES
Peripheral Block    Patient location during procedure: pre-op  Reason for block: post-op pain management and at surgeon's request  Start time: 8/27/2024 3:01 PM  End time: 8/27/2024 3:04 PM  Staffing  Performed: anesthesiologist   Anesthesiologist: Harish Watson MD  Performed by: Harish Watson MD  Authorized by: Harish Watson MD    Preanesthetic Checklist  Completed: patient identified, IV checked, site marked, risks and benefits discussed, surgical/procedural consents, equipment checked, pre-op evaluation, timeout performed, anesthesia consent given, oxygen available and monitors applied/VS acknowledged  Peripheral Block   Patient position: supine  Prep: ChloraPrep  Provider prep: mask  Patient monitoring: cardiac monitor, continuous pulse ox, IV access, oxygen, responsive to questions and frequent blood pressure checks  Block type: Saphenous  Laterality: right  Injection technique: single-shot  Guidance: ultrasound guided  Local infiltration: lidocaine  Local infiltration: lidocaine    Needle   Needle type: insulated echogenic nerve stimulator needle   Needle gauge: 22 G  Needle localization: ultrasound guidance  Needle length: 8 cm  Assessment   Injection assessment: negative aspiration for heme, no paresthesia on injection and local visualized surrounding nerve on ultrasound  Paresthesia pain: none  Slow fractionated injection: yes  Hemodynamics: stable  Outcomes: uncomplicated and patient tolerated procedure well    Additional Notes  Ultrasound was used to visualize nerves and local anesthetic spread.  Medications Administered  midazolam (VERSED) injection 2 mg/2mL - IntraVENous   2 mg - 8/27/2024 3:01:00 PM  ropivacaine (NAROPIN) injection 0.5% - Perineural   20 mL - 8/27/2024 3:01:00 PM

## 2024-08-27 NOTE — PROGRESS NOTES
TRANSFER - OUT REPORT:    Verbal report given to SHABANA Kirkland on Lanny Elliott  being transferred to [facility] for routine progression of patient care       Report consisted of patient's Situation, Background, Assessment and   Recommendations(SBAR).     Information from the following report(s) Nurse Handoff Report, Adult Overview, Surgery Report, Intake/Output, and MAR was reviewed with the receiving nurse.           Lines:   Peripheral IV 08/24/24 Left Antecubital (Active)   Site Assessment Clean, dry & intact 08/27/24 1740   Line Status Infusing 08/27/24 1740   Line Care Connections checked and tightened 08/27/24 1740   Phlebitis Assessment No symptoms 08/27/24 1740   Infiltration Assessment 0 08/27/24 1740   Alcohol Cap Used No 08/27/24 1740   Dressing Status Clean, dry & intact 08/27/24 1740   Dressing Type Transparent 08/27/24 1740   Dressing Intervention New 08/27/24 1740       Peripheral IV 08/25/24 Posterior;Right Forearm (Active)   Site Assessment Clean, dry & intact 08/27/24 1740   Line Status Normal saline locked 08/27/24 1740   Line Care Connections checked and tightened 08/27/24 1740   Phlebitis Assessment No symptoms 08/27/24 1740   Infiltration Assessment 0 08/27/24 1740   Alcohol Cap Used No 08/27/24 1740   Dressing Status Clean, dry & intact 08/27/24 1740   Dressing Type Transparent 08/27/24 1740   Dressing Intervention New 08/27/24 1740        Opportunity for questions and clarification was provided.      Patient transported with:  Registered Nurse

## 2024-08-27 NOTE — PROGRESS NOTES
Hospitalist Progress Note-critical care note     Patient: Lanny Elliott MRN: [id number]  [facility]: [id number]    YOB: 1945  Age: 79 y.o.  Sex: female    DOA: 8/24/2024 LOS:  LOS: 3 days            Chief complaint: septic joint hypokalemia , htn ,     Assessment/Plan         Active Hospital Problems    Diagnosis Date Noted    Septic joint (HCC) [M00.9] 08/25/2024    Temporal arteritis (HCC) [M31.6] 08/25/2024    Hypokalemia [E87.6] 08/25/2024    HTN (hypertension) [I10] 08/25/2024        Knee effusion/with osteolysis worsening  Plan is for washout today   ID consult already on Vanc/zosyn  Follow sed rate/esr  Drop Prednisone to 4mg per patient   Last dose of 8/13 of Actemra   Replacement scheduled for 9/10       Right knee pain with swelling possible septic joint with osteolysis  She is on empiric Zosyn and vancomycin   infectious disease consultation  Plan is for washout today-lovenox hold for procedure -will restart after procedure and ortho approval     Temporal arthritis last Actemra dose August 13  Resume prednisone 4 mg daily  Follow sed rate and CRP        Hypokalemia, replaced and resolved   Placed on electrolyte protocol  Will give K per iv and mg to keep at 4/2      Hypertension aggravated by pain but not on regular medicines  Hydralazine as needed  EKG August 7 with normal  echo done and report still pending    Subjective still in pain, I can not wait for surgery     TIME: E/M Time spent with patient and patient care issues: [] 31-40 mins  [x] 41-49 mins  [] 50 mins or more.      Disposition :tbd,   Review of systems:    General: No fevers or chills.  Cardiovascular: No chest pain or pressure. No palpitations.   Pulmonary: No shortness of breath.   Gastrointestinal: No nausea, vomiting.     Vital signs/Intake and Output:  Visit Vitals  BP (!) 140/63   Pulse 92   Temp 99.3 °F (37.4 °C) (Oral)   Resp 16   Ht 1.651 m (5' 5\")   Wt 57.6 kg (127 lb)   SpO2 99%   BMI 21.13 kg/m²     Current Shift:

## 2024-08-27 NOTE — INTERVAL H&P NOTE
Update History & Physical    The patient's History and Physical  was reviewed with the patient and I examined the patient. There was no change. The surgical site was confirmed by the patient and me.     Plan: The risks, benefits, expected outcome, and alternative to the recommended procedure have been discussed with the patient. Patient understands and wants to proceed with the procedure.       Plan for washout evaluation with possible conversion to open with antibiotic spacer placement if concerns for infection is obvious     Electronically signed by David Jarrett DO on 8/27/2024 at 2:53 PM

## 2024-08-27 NOTE — ANESTHESIA PRE PROCEDURE
Department of Anesthesiology  Preprocedure Note       Name:  Lanny Elliott   Age:  79 y.o.  :  1945                                          MRN:  [id number]         Date:  2024      Surgeon: Surgeon(s):  David Jarrett DO    Procedure: Procedure(s):  RIGHT KNEE ARTHROSCOPIC WASHOUT (PT IN ROOM #326)    Medications prior to admission:   Prior to Admission medications    Medication Sig Start Date End Date Taking? Authorizing Provider   predniSONE (DELTASONE) 5 MG tablet Take 1 tablet by mouth daily   Yes Peterson Ortiz MD   sulindac (CLINORIL) 200 MG tablet Take 1 tablet by mouth 2 times daily   Yes Peterson Ortiz MD   acetaminophen (TYLENOL) 500 MG tablet Take 2 tablets by mouth every 6 hours as needed for Pain   Yes Peterson Ortiz MD   CRANBERRY PO Take 1 capsule by mouth daily   Yes Peterson Ortiz MD   Omega-3 Fatty Acids (FISH OIL PO) Take 1 capsule by mouth 2 times daily   Yes Peterson Ortiz MD   Multiple Vitamins-Minerals (THERAPEUTIC MULTIVITAMIN-MINERALS) tablet Take 1 tablet by mouth daily   Yes Peterson Ortiz MD   polycarbophil (FIBERCON) 625 MG tablet Take 1 tablet by mouth 2 times daily   Yes Peterson Ortiz MD   omeprazole (PRILOSEC) 20 MG delayed release capsule Take 1 capsule by mouth daily   Yes ProviderPeterson MD       Current medications:    Current Facility-Administered Medications   Medication Dose Route Frequency Provider Last Rate Last Admin   • ceFAZolin (ANCEF) 2,000 mg in sterile water 20 mL IV syringe  2,000 mg IntraVENous On Call to OR Cami Brown PA-C       • magnesium sulfate 2000 mg in water 50 mL IVPB  2,000 mg IntraVENous Once Aye Jerry MD 25 mL/hr at 24 1250 2,000 mg at 24 1250   • potassium chloride 10 mEq/100 mL IVPB (Peripheral Line)  10 mEq IntraVENous Q1H Aye Jerry  mL/hr at 24 1251 10 mEq at 24 1251   • lactated ringers IV soln infusion   IntraVENous Continuous Burrow,

## 2024-08-27 NOTE — PROGRESS NOTES
TRANSFER - IN REPORT:    Verbal report received from RN,CRNA on [facility]  being received from OR for routine post-op      Report consisted of patient's Situation, Background, Assessment and   Recommendations(SBAR).     Information from the following report(s) Nurse Handoff Report was reviewed with the receiving nurse.    Opportunity for questions and clarification was provided.      Assessment completed upon patient's arrival to unit and care assumed.

## 2024-08-27 NOTE — BRIEF OP NOTE
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, initial encounter ([facility]) [T84.53XA]    Post-Op Diagnosis: Same       Procedure(s):  RIGHT KNEE ARTHROSCOPIC WASHOUT (PT IN ROOM #326)    Surgeon(s):  David Jarrett DO    Assistant:  Surgical Assistant: Tracy Dewitt  Physician Assistant: Cami Brown PA-C    Anesthesia: General    Estimated Blood Loss (mL): less than 50     Complications: None    Specimens:   ID Type Source Tests Collected by Time Destination   1 : RIGHT KNEE CULTURE Swab Joint, Knee CULTURE, ANAEROBIC, CULTURE, FUNGUS, CULTURE, WOUND, CULTURE WITH SMEAR, ACID FAST BACILLIUS David Jarrett DO 8/27/2024 1547    2 : RIGHT KNEE TISSUE Tissue Joint, Knee CULTURE, ANAEROBIC, CULTURE, FUNGUS, CULTURE, TISSUE, CULTURE WITH SMEAR, ACID FAST BACILLIDavid Guido DO 8/27/2024 1551        Implants:  * No implants in log *      Drains: * No LDAs found *    Findings:    Other Findings: synovitis no gross loosening of components no purulence  Large displaced lateral meniscal tear      Electronically signed by David Jarrett DO on 8/27/2024 at 4:22 PM

## 2024-08-28 ENCOUNTER — APPOINTMENT (OUTPATIENT)
Facility: HOSPITAL | Age: 79
DRG: 467 | End: 2024-08-28
Attending: ORTHOPAEDIC SURGERY
Payer: MEDICARE

## 2024-08-28 PROBLEM — K59.00 CONSTIPATION: Status: ACTIVE | Noted: 2024-08-28

## 2024-08-28 LAB
ANION GAP SERPL CALC-SCNC: 6 MMOL/L (ref 3–18)
BACTERIA SPEC CULT: NORMAL
BACTERIA SPEC CULT: NORMAL
BUN SERPL-MCNC: 9 MG/DL (ref 7–18)
BUN/CREAT SERPL: 20 (ref 12–20)
CALCIUM SERPL-MCNC: 9.2 MG/DL (ref 8.5–10.1)
CHLORIDE SERPL-SCNC: 106 MMOL/L (ref 100–111)
CO2 SERPL-SCNC: 25 MMOL/L (ref 21–32)
CREAT SERPL-MCNC: 0.46 MG/DL (ref 0.6–1.3)
ERYTHROCYTE [DISTWIDTH] IN BLOOD BY AUTOMATED COUNT: 15.7 % (ref 11.6–14.5)
GLUCOSE SERPL-MCNC: 122 MG/DL (ref 74–99)
GRAM STN SPEC: NORMAL
GRAM STN SPEC: NORMAL
HCT VFR BLD AUTO: 30.3 % (ref 35–45)
HGB BLD-MCNC: 10.4 G/DL (ref 12–16)
MAGNESIUM SERPL-MCNC: 1.7 MG/DL (ref 1.6–2.6)
MCH RBC QN AUTO: 31.4 PG (ref 24–34)
MCHC RBC AUTO-ENTMCNC: 34.3 G/DL (ref 31–37)
MCV RBC AUTO: 91.5 FL (ref 78–100)
NRBC # BLD: 0 K/UL (ref 0–0.01)
NRBC BLD-RTO: 0 PER 100 WBC
PLATELET # BLD AUTO: 204 K/UL (ref 135–420)
PMV BLD AUTO: 9.3 FL (ref 9.2–11.8)
POTASSIUM SERPL-SCNC: 3.9 MMOL/L (ref 3.5–5.5)
RBC # BLD AUTO: 3.31 M/UL (ref 4.2–5.3)
SERVICE CMNT-IMP: NORMAL
SERVICE CMNT-IMP: NORMAL
SODIUM SERPL-SCNC: 137 MMOL/L (ref 136–145)
WBC # BLD AUTO: 10.9 K/UL (ref 4.6–13.2)

## 2024-08-28 PROCEDURE — 97166 OT EVAL MOD COMPLEX 45 MIN: CPT

## 2024-08-28 PROCEDURE — 83735 ASSAY OF MAGNESIUM: CPT

## 2024-08-28 PROCEDURE — 1100000000 HC RM PRIVATE

## 2024-08-28 PROCEDURE — 36415 COLL VENOUS BLD VENIPUNCTURE: CPT

## 2024-08-28 PROCEDURE — 2580000003 HC RX 258: Performed by: ORTHOPAEDIC SURGERY

## 2024-08-28 PROCEDURE — 6370000000 HC RX 637 (ALT 250 FOR IP): Performed by: PHYSICIAN ASSISTANT

## 2024-08-28 PROCEDURE — 97162 PT EVAL MOD COMPLEX 30 MIN: CPT

## 2024-08-28 PROCEDURE — 80048 BASIC METABOLIC PNL TOTAL CA: CPT

## 2024-08-28 PROCEDURE — 6370000000 HC RX 637 (ALT 250 FOR IP): Performed by: INTERNAL MEDICINE

## 2024-08-28 PROCEDURE — 6360000002 HC RX W HCPCS: Performed by: ORTHOPAEDIC SURGERY

## 2024-08-28 PROCEDURE — 6360000002 HC RX W HCPCS: Performed by: PHYSICIAN ASSISTANT

## 2024-08-28 PROCEDURE — 97116 GAIT TRAINING THERAPY: CPT

## 2024-08-28 PROCEDURE — 97530 THERAPEUTIC ACTIVITIES: CPT

## 2024-08-28 PROCEDURE — 6370000000 HC RX 637 (ALT 250 FOR IP): Performed by: ORTHOPAEDIC SURGERY

## 2024-08-28 PROCEDURE — 6370000000 HC RX 637 (ALT 250 FOR IP): Performed by: HOSPITALIST

## 2024-08-28 PROCEDURE — 85027 COMPLETE CBC AUTOMATED: CPT

## 2024-08-28 PROCEDURE — 2580000003 HC RX 258: Performed by: PHYSICIAN ASSISTANT

## 2024-08-28 PROCEDURE — 73560 X-RAY EXAM OF KNEE 1 OR 2: CPT

## 2024-08-28 RX ORDER — LACTULOSE 10 G/15ML
20 SOLUTION ORAL 3 TIMES DAILY
Status: COMPLETED | OUTPATIENT
Start: 2024-08-28 | End: 2024-08-28

## 2024-08-28 RX ORDER — DOCUSATE SODIUM 100 MG/1
100 CAPSULE, LIQUID FILLED ORAL 2 TIMES DAILY
Status: DISCONTINUED | OUTPATIENT
Start: 2024-08-28 | End: 2024-09-07 | Stop reason: HOSPADM

## 2024-08-28 RX ORDER — SODIUM CHLORIDE, SODIUM LACTATE, POTASSIUM CHLORIDE, CALCIUM CHLORIDE 600; 310; 30; 20 MG/100ML; MG/100ML; MG/100ML; MG/100ML
INJECTION, SOLUTION INTRAVENOUS CONTINUOUS
Status: DISCONTINUED | OUTPATIENT
Start: 2024-08-28 | End: 2024-08-28

## 2024-08-28 RX ORDER — TRANEXAMIC ACID 10 MG/ML
1000 INJECTION, SOLUTION INTRAVENOUS SEE ADMIN INSTRUCTIONS
Status: DISCONTINUED | OUTPATIENT
Start: 2024-08-28 | End: 2024-09-05 | Stop reason: HOSPADM

## 2024-08-28 RX ORDER — POLYETHYLENE GLYCOL 3350 17 G/17G
17 POWDER, FOR SOLUTION ORAL 2 TIMES DAILY
Status: DISCONTINUED | OUTPATIENT
Start: 2024-08-28 | End: 2024-09-07 | Stop reason: HOSPADM

## 2024-08-28 RX ORDER — ACETAMINOPHEN 500 MG
1000 TABLET ORAL ONCE
Status: DISCONTINUED | OUTPATIENT
Start: 2024-08-28 | End: 2024-09-05 | Stop reason: HOSPADM

## 2024-08-28 RX ORDER — MELOXICAM 7.5 MG/1
15 TABLET ORAL DAILY
Status: DISCONTINUED | OUTPATIENT
Start: 2024-08-28 | End: 2024-09-07 | Stop reason: HOSPADM

## 2024-08-28 RX ADMIN — LACTULOSE 20 G: 10 SOLUTION ORAL at 11:20

## 2024-08-28 RX ADMIN — OXYCODONE HYDROCHLORIDE 10 MG: 5 TABLET ORAL at 08:58

## 2024-08-28 RX ADMIN — Medication 625 MG: at 20:32

## 2024-08-28 RX ADMIN — Medication 1 TABLET: at 08:59

## 2024-08-28 RX ADMIN — PREDNISONE 4 MG: 1 TABLET ORAL at 08:59

## 2024-08-28 RX ADMIN — Medication 625 MG: at 09:04

## 2024-08-28 RX ADMIN — OXYCODONE HYDROCHLORIDE 10 MG: 5 TABLET ORAL at 20:44

## 2024-08-28 RX ADMIN — VANCOMYCIN HYDROCHLORIDE 750 MG: 750 INJECTION, POWDER, LYOPHILIZED, FOR SOLUTION INTRAVENOUS at 09:01

## 2024-08-28 RX ADMIN — ACETAMINOPHEN 650 MG: 325 TABLET ORAL at 06:07

## 2024-08-28 RX ADMIN — POLYETHYLENE GLYCOL 3350 17 G: 17 POWDER, FOR SOLUTION ORAL at 20:32

## 2024-08-28 RX ADMIN — LACTULOSE 20 G: 10 SOLUTION ORAL at 20:32

## 2024-08-28 RX ADMIN — LACTULOSE 20 G: 10 SOLUTION ORAL at 15:44

## 2024-08-28 RX ADMIN — MELOXICAM 15 MG: 7.5 TABLET ORAL at 08:59

## 2024-08-28 RX ADMIN — SODIUM CHLORIDE, PRESERVATIVE FREE 10 ML: 5 INJECTION INTRAVENOUS at 09:00

## 2024-08-28 RX ADMIN — DOCUSATE SODIUM 100 MG: 100 CAPSULE, LIQUID FILLED ORAL at 20:32

## 2024-08-28 RX ADMIN — PANTOPRAZOLE SODIUM 40 MG: 40 TABLET, DELAYED RELEASE ORAL at 06:08

## 2024-08-28 RX ADMIN — SODIUM CHLORIDE, PRESERVATIVE FREE 10 ML: 5 INJECTION INTRAVENOUS at 20:33

## 2024-08-28 RX ADMIN — POLYETHYLENE GLYCOL 3350 17 G: 17 POWDER, FOR SOLUTION ORAL at 11:20

## 2024-08-28 RX ADMIN — ACETAMINOPHEN 650 MG: 325 TABLET ORAL at 19:03

## 2024-08-28 RX ADMIN — VANCOMYCIN HYDROCHLORIDE 750 MG: 750 INJECTION, POWDER, LYOPHILIZED, FOR SOLUTION INTRAVENOUS at 20:33

## 2024-08-28 RX ADMIN — ACETAMINOPHEN 650 MG: 325 TABLET ORAL at 14:05

## 2024-08-28 RX ADMIN — ACETAMINOPHEN 650 MG: 325 TABLET ORAL at 01:24

## 2024-08-28 RX ADMIN — ENOXAPARIN SODIUM 40 MG: 100 INJECTION SUBCUTANEOUS at 08:59

## 2024-08-28 RX ADMIN — DOCUSATE SODIUM 100 MG: 100 CAPSULE, LIQUID FILLED ORAL at 11:20

## 2024-08-28 ASSESSMENT — PAIN DESCRIPTION - LOCATION
LOCATION: KNEE
LOCATION: LEG
LOCATION: LEG
LOCATION: KNEE

## 2024-08-28 ASSESSMENT — PAIN SCALES - GENERAL
PAINLEVEL_OUTOF10: 7
PAINLEVEL_OUTOF10: 8
PAINLEVEL_OUTOF10: 1
PAINLEVEL_OUTOF10: 2
PAINLEVEL_OUTOF10: 7
PAINLEVEL_OUTOF10: 4

## 2024-08-28 ASSESSMENT — PAIN - FUNCTIONAL ASSESSMENT
PAIN_FUNCTIONAL_ASSESSMENT: PREVENTS OR INTERFERES SOME ACTIVE ACTIVITIES AND ADLS

## 2024-08-28 ASSESSMENT — PAIN DESCRIPTION - ONSET
ONSET: ON-GOING
ONSET: ON-GOING

## 2024-08-28 ASSESSMENT — PAIN DESCRIPTION - DESCRIPTORS
DESCRIPTORS: SHARP
DESCRIPTORS: ACHING;PRESSURE
DESCRIPTORS: ACHING

## 2024-08-28 ASSESSMENT — PAIN DESCRIPTION - FREQUENCY
FREQUENCY: CONTINUOUS
FREQUENCY: CONTINUOUS

## 2024-08-28 ASSESSMENT — PAIN DESCRIPTION - ORIENTATION
ORIENTATION: RIGHT

## 2024-08-28 NOTE — PROGRESS NOTES
98.9 °F (37.2 °C) (Oral)   Resp 16   Ht 1.651 m (5' 5\")   Wt 57.6 kg (127 lb)   SpO2 96%   BMI 21.13 kg/m²   Temp (24hrs), Av.1 °F (36.7 °C), Min:97.6 °F (36.4 °C), Max:98.9 °F (37.2 °C)      Lines: PIV    General:   WD WN , awake alert and oriented   Skin:   no diffuse rash  No peripheral IE finding on skin exam/ extremities  Right knee is dressed   HEENT:  Normocephalic, atraumatic, EOMI, no scleral icterus or pallor; no conjunctival hemmohage;  No thrush       Lungs:   non-labored       Abdomen:  soft, non-distended, active bowel sounds. Appropriate surgical scars for stated surgeries. Non-tender   Genitourinary:  deferred   Extremities:   no clubbing, cyanosis; tender right knee/swelling but no erythema;  muscle mass appropriate for age   Neurologic:  No gross focal sensory abnormalities;  Cranial nerves intact   Psychiatric:   appropriate and interactive.        Labs: Results:   Chemistry Recent Labs     243 24  0305 24  0208 24  0638    141 139 137   K 3.8 4.1 3.6 3.9    109 107 106   CO2  25   BUN 13 12 7 9   GLOB 2.6  --   --   --       CBC w/Diff Recent Labs     24  0305 24  0638   WBC 6.5 6.4 10.9   RBC 3.45* 3.53* 3.31*   HGB 10.7* 11.0* 10.4*   HCT 32.1* 33.0* 30.3*    203 204            No results found for: \"SDES\" No components found for: \"CULT\"         Imaging:   All imaging reviewed from Admission to present as per radiology interpretation in [facility]    Radiology report last 24 hours:    US DUPLEX LOWER EXTREMITY VEINS RIGHT    Result Date: 2024  CLINICAL HISTORY: Right knee and calf swelling x3 months. COMPARISON: Right knee CT 2024. FINDINGS: Grayscale and color and spectral Doppler imaging performed of right lower extremity. Flow and compressibility with spontaneous and phasic venous waveforms seen in right common femoral, superficial femoral, and popliteal veins. Flow demonstrated in right  external iliac, greater saphenous, profunda femoral, gastrocnemius, posterior tibial, and peroneal veins and left external iliac and common femoral veins. 4.6 x 3.3 x 2.0 cm Baker's cyst right popliteal fossa.    IMPRESSION: 1. No evidence of deep venous thrombosis right lower extremity. 2. Moderate right Baker's cyst. Reading Doctor: Sean Pat Electronic Signature by: Sean Pat    CT KNEE RIGHT W CONTRAST    Result Date: 8/24/2024  CT right knee. COMPARISON: 7/3/2024. CLINICAL INFORMATION: Partial right knee replacement. Pain, swelling. FINDINGS: Axial source as well as sagittal and coronal reformatted images were with obtained without IV contrast. A partial right knee replacement is identified involving the medial joint space. Previously identified. Periprosthetic osteolysis within the medial aspect of the proximal tibia has increased in comparison to the previous study, now measuring 3.3 x 2.6 cm in diameter compared to 2.2 x 1.7 cm. There is no evidence acute fracture identified. A suprapatellar joint effusion has decreased in size in comparison to the previous study. A right popliteal cyst has decreased in size in comparison to the previous study.    IMPRESSION: Partial right knee replacement. 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 Reading Doctor: Yony Jensen Electronic Signature by: Yony Jensen MD  [facility]  Office #:     [phone]  [phone] Option #8   Office Fax: [phone]

## 2024-08-28 NOTE — PROGRESS NOTES
[facility]   Pharmacy Pharmacokinetic Monitoring Service - Vancomycin    Consulting Provider: Dr. Licea   Indication: Bone and Joint Infection  Target Concentration: Goal AUC/FUNMI 400-600 mg*hr/L  Day of Therapy: 4  Additional Antimicrobials: none    Pertinent Laboratory Values:   Wt Readings from Last 1 Encounters:   08/26/24 57.6 kg (127 lb)     Temp Readings from Last 1 Encounters:   08/28/24 98.1 °F (36.7 °C) (Oral)     Estimated Creatinine Clearance: 86 mL/min (A) (based on SCr of 0.46 mg/dL (L)).  Recent Labs     08/26/24  0305 08/27/24  0208 08/28/24  0638   CREATININE 0.51* 0.52* 0.46*   BUN 12 7 9   WBC 6.4  --  10.9     Recent vancomycin administrations                     vancomycin (VANCOCIN) 750 mg in sodium chloride 0.9 % 250 mL IVPB (vial-mate) (mg) 750 mg New Bag 08/28/24 0901     750 mg New Bag 08/27/24 2206     750 mg New Bag  1110     750 mg New Bag 08/26/24 2135     750 mg New Bag  1110     750 mg New Bag 08/25/24 1725                  Plan:  Current dosing regimen is therapeutic  Continue current dose:  Vancomycin 750 mg IV q12h        Est AUC (ss): 429 mg/L.hr    Est trough (ss): 11.2 mg/L   Vancomycin Random level ordered for 8/29/24 at 06:00  Pharmacy will continue to monitor patient and adjust therapy as indicated    Thank you for the consult,  Collette Chauhan, PharmD  8/28/2024 11:57 AM

## 2024-08-28 NOTE — PROGRESS NOTES
Hospitalist Progress Note-critical care note     Patient: Lanny Elliott MRN: [id number]  [facility]: [id number]    YOB: 1945  Age: 79 y.o.  Sex: female    DOA: 8/24/2024 LOS:  LOS: 4 days            Chief complaint: septic joint hypokalemia , htn ,     Assessment/Plan         Active Hospital Problems    Diagnosis Date Noted    Constipation [K59.00] 08/28/2024    Septic joint (HCC) [M00.9] 08/25/2024    Temporal arteritis (HCC) [M31.6] 08/25/2024    Hypokalemia [E87.6] 08/25/2024    HTN (hypertension) [I10] 08/25/2024        Knee effusion/with osteolysis worsening  Plan is for washout on  8/27 -culture sent   ID consult already on Vanc/zosyn-change to ancef and vanc now   Follow sed rate/esr  Drop Prednisone to 4mg per patient   Last dose of 8/13 of Actemra   Replacement scheduled for 9/10       Right knee pain with swelling possible septic joint with osteolysis  She is on empiric Zosyn and vancomycin   infectious disease consultation  See above   procedure and ortho approval     Temporal arthritis last Actemra dose August 13  Resume prednisone 4 mg daily  Follow sed rate and CRP        Hypokalemia, replaced and resolved      Hypertension aggravated by pain but not on regular medicines  Hydralazine as needed  EKG August 7 with normal  echo done with normal pasp 37      temporal arteritis -on low dose prednisone per rheumatologist     Constipation : last BM  last Saturday , add colace, miralax and lactulose , increase physical activity     Subjective can u turn on the air conditioner, I need my breakfast , my knee still in pain      TIME: E/M Time spent with patient and patient care issues: [] 31-40 mins  [x] 41-49 mins  [] 50 mins or more.      Disposition :tbd,   Review of systems:    General: No fevers or chills.  Cardiovascular: No chest pain or pressure. No palpitations.   Pulmonary: No shortness of breath.   Gastrointestinal: No nausea, vomiting.   Msk : knee pain     Vital signs/Intake and  Output:  Visit Vitals  BP (!) 158/56   Pulse 83   Temp 98.1 °F (36.7 °C) (Oral)   Resp 16   Ht 1.651 m (5' 5\")   Wt 57.6 kg (127 lb)   SpO2 96%   BMI 21.13 kg/m²     Current Shift:  No intake/output data recorded.  Last three shifts:  08/26 1901 - 08/28 0700  In: 1420 [P.O.:420; I.V.:1000]  Out: 2550 [Urine:2550]        Physical Exam:  General: WD, WN.  Alert, cooperative, no acute distress    HEENT: NC, Atraumatic.  PERRLA, anicteric sclerae.  Lungs: CTA Bilaterally. No Wheezing/Rhonchi/Rales.  Heart:  Regular  rhythm,  No murmur, No Rubs, No Gallops  Abdomen: Soft, Non distended, Non tender.  +Bowel sounds,   Extremities: No c/c, rt knee wrapped with ace bandage    Psych:   Not anxious or agitated.  Neurologic:  No acute neurological deficit.             Labs: Results:       Chemistry Recent Labs     08/25/24  1044 08/25/24  2353 08/26/24  0305 08/27/24  0208 08/28/24  0638    142 141 139 137   K 3.6 3.8 4.1 3.6 3.9    109 109 107 106   CO2 27 26 26 25 25   BUN 12 13 12 7 9   GLOB 2.4 2.6  --   --   --       CBC w/Diff Recent Labs     08/25/24  2353 08/26/24  0305 08/28/24  0638   WBC 6.5 6.4 10.9   RBC 3.45* 3.53* 3.31*   HGB 10.7* 11.0* 10.4*   HCT 32.1* 33.0* 30.3*    203 204      Cardiac Enzymes No results for input(s): \"CPK\" in the last 72 hours.    Invalid input(s): \"CKRMB\", \"CKND1\", \"TROIP\", \"CHAVO\"   Coagulation Recent Labs     08/26/24  0305   INR 0.9   APTT 24.0       Lipid Panel No results found for: \"CHOL\", \"CHOLPOCT\", \"CHLST\", \"CHOLV\", \"053237\", \"HDL\", \"HDLC\", \"LDL\", \"VLDLC\", \"VLDL\"   BNP Invalid input(s): \"BNPP\"   Liver Enzymes No results for input(s): \"TP\" in the last 72 hours.    Invalid input(s): \"ALB\", \"TBIL\", \"AP\", \"SGOT\", \"GPT\", \"DBIL\"   Thyroid Studies Lab Results   Component Value Date/Time    TSH 4.60 08/25/2024 11:53 PM        Procedures/imaging: see electronic medical records for all procedures/Xrays and details which were not copied into this note but were reviewed prior

## 2024-08-28 NOTE — PROGRESS NOTES
Physical Therapy Goals:  Initiated 8/28/2024 to be met within 7-10 days.  Short Term Goals  Short Term Goal 1: Patient will perform supine to/from sit with SBA  Short Term Goal 2: Patient will perform sit to/from stand with SB-CGA in preperation for gait progression  Short Term Goal 3: Patient will ambulate 100 ft with RW and SB-CGA to progress OOB mobility  Short Term Goal 4: Patient will negotiate 3 stairs with handrails and CGA to simulate home entry    []  Patient has met MD nichole wu for d/c home   []  Recommend HH with 24 hour adult care   [x]  Benefit from additional acute PT session to address:  gait and stair training    PHYSICAL THERAPY EVALUATION    Patient: Lanny Elliott (79 y.o. female)  Date: 8/28/2024  Primary Diagnosis: Septic joint (HCC) [M00.9]  Procedure(s) (LRB):  RIGHT KNEE ARTHROSCOPIC WASHOUT (PT IN ROOM #326) (Right) 1 Day Post-Op   Precautions: Fall Risk, Weight Bearing, Right Lower Extremity Weight Bearing: Weight Bearing As Tolerated  PLOF: Ambulation with RW in AM, with transition to cane during remainder of day    ASSESSMENT :  Based on the objective data described below, the patient presents with lower extremity weakness, decreased gait quality and endurance, impaired bed mobility and transfers, decreased AROM/flexibility, and overall limitations in functional mobility s/p surgery noted above. Patient keeping R hip externally rotated and knee flexed with ankle dorsiflexed. Performed PROM/stretching to improve this however patient screams and refuses to continue. Educated provided and pt states \"I know but it hurts so I can't.\" Pt performed sit to stand with Yayo. Patient ambulated 2 feet with RW, GB applied, Yayo for weight shifting and balance. Patient refuses to place weight through the R LE and only maintains toe down despite cues for flat foot. Pt educated on icing, elevation, positioning, home safety, home exercise program, and activity recommendations. Patient  Fair  Ambulation/Gait Training:  Gait Training  Right Side Weight Bearing: As tolerated  Gait  Gait Training: Yes  Right Side Weight Bearing: As tolerated  Overall Level of Assistance: Contact-guard assistance  Distance (ft): 2 Feet (lateral)  Assistive Device: Gait belt;Walker, rolling  Interventions: Verbal cues  Base of Support: Shift to left  Speed/Marie: Slow  Stance: Time  Gait Abnormalities: Decreased step clearance  Pain:  Pain level pre-treatment: 10/10   Pain level post-treatment: 10/10   Pain Intervention(s): Medication (see MAR); Rest, Ice, Repositioning  Response to intervention: Nurse notified, See doc flow    Activity Tolerance:   Activity Tolerance: Patient tolerated evaluation without incident;Patient limited by pain    After treatment:   []         Patient left in no apparent distress sitting up in chair  [x]         Patient left in no apparent distress in bed  [x]         Call bell left within reach  [x]         Nursing notified  []         Caregiver present  []         Bed alarm activated  []         SCDs applied    COMMUNICATION/EDUCATION:   Patient Education  Education Given To: Patient  Education Provided: Role of Therapy;Plan of Care;Home Exercise Program;Transfer Training;Equipment;Fall Prevention Strategies  Education Method: Demonstration;Verbal  Barriers to Learning: None  Education Outcome: Verbalized understanding;Continued education needed    Eval Complexity: Decision Making: Low Complexity    Thank you for this referral.  Sowmya Lazaro, NICKY  Minutes: 34

## 2024-08-28 NOTE — PROGRESS NOTES
Progress Note      Patient: Lanny Elliott               Sex: female          DOA: 8/24/2024     YOB: 1945      Age:  79 y.o.        LOS:  LOS: 4 days     Status Post: Procedure(s):  RIGHT KNEE ARTHROSCOPIC WASHOUT (PT IN ROOM #326)  Surgery Date: [unfilled]            Subjective:     Lanny Elliott is a 79 y.o. female who c/o moderate right knee pain diffuse and continued radiating symptoms to foot. Patient denies any complaint SOB or difficulty breathing. She reports only taking tylenol as she feels the oxycodone does not work for her. Noted large lateral meniscal tear intraoperatively as well as synovitis and poly wear.     Objective:      Visit Vitals  BP (!) 145/69   Pulse 85   Temp 98.4 °F (36.9 °C) (Oral)   Resp 16   Ht 1.651 m (5' 5\")   Wt 57.6 kg (127 lb)   SpO2 97%   BMI 21.13 kg/m²       Physical Exam:   Dressing:  clean, dry, intact  Increased hypersensitivity to touch with inability to fully extend knee secondary to pain.   Difficulty with ROM of hip secondary to knee pain.  Wiggles Toes/Ankle  Foot sensation intact to light touch  No foot edema/ +1 Posterior Tibial Pulse    Intake and Output:  Current Shift:  No intake/output data recorded.  Last three shifts:  08/26 1901 - 08/28 0700  In: 1420 [P.O.:420; I.V.:1000]  Out: 2550 [Urine:2550]  Voiding Status:  + void without need for Jordan catheter    Lab/Data Reviewed:    Lab Results   Component Value Date/Time    WBC 10.9 08/28/2024 06:38 AM    RBC 3.31 (L) 08/28/2024 06:38 AM    Hemoglobin 10.4 (L) 08/28/2024 06:38 AM    Hematocrit 30.3 (L) 08/28/2024 06:38 AM    MCV 91.5 08/28/2024 06:38 AM    MCH 31.4 08/28/2024 06:38 AM    MCHC 34.3 08/28/2024 06:38 AM    RDW 15.7 (H) 08/28/2024 06:38 AM    Platelets 204 08/28/2024 06:38 AM    MPV 9.3 08/28/2024 06:38 AM     Lab Results   Component Value Date/Time    Sodium 137 08/28/2024 06:38 AM    Potassium 3.9 08/28/2024 06:38 AM    Chloride 106 08/28/2024 06:38 AM    CO2 25 08/28/2024  06:38 AM    BUN 9 08/28/2024 06:38 AM    Creatinine 0.46 (L) 08/28/2024 06:38 AM    Glucose 122 (H) 08/28/2024 06:38 AM    Calcium 9.2 08/28/2024 06:38 AM     Medications Reviewed    Assessment/Plan     Principal Problem:    Septic joint (HCC)  Active Problems:    Temporal arteritis (HCC)    Hypokalemia    HTN (hypertension)  Resolved Problems:    * No resolved hospital problems. *      Discontinue Oxygen.  Continue to follow intraoperative cultures  OOB and WBAT with PT  Can give a dose of toradol to try and help with acute postop pain  Anticipate discharge home this afternoon or tomorrow pending ID recommendations/input  Follow up outpatient with Dr. Jarrett in 10 days    The patient's plan of care discussed with Dr. Jarrett today as well and he is in agreement with above.

## 2024-08-28 NOTE — PROGRESS NOTES
Pt. Refused to have midnight vitals taken and wants the 3AM one instead.    Encouraged patient to ambulate and use ICS. Unable to even sit right now due to pain. Tylenol given. Per pt, she does not want roxicodone as it does not work for her.

## 2024-08-28 NOTE — CARE COORDINATION
08/28/24 1519   /Social Work Whiteboard Notes   /Social Work Whiteboard YELLOW 8/28 ID following and PT recs for SNF. Referrals sent and OT pending. Pt would like a facility in [place]       ADI met with pt at bedside, introduced self and explained role. ADI discussed PT recs for SNF vs Home with HH and 24 hr supervision. 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 Pt acknowledged that it may be best she get therapy at a SNF so that she can return home and be stronger and continue assisting her .     Pt requesting a facility in or near [place]. ADI provided pt with a star rated SNF list of facilities in and near [facility]. ADI sent referrals in [facility] to facilities [place] and will follow up with pt to obtain her 3 preferences.     OT eval pending, ADI  to follow.    RASHID David  Case Management Department

## 2024-08-28 NOTE — PROGRESS NOTES
2030  Patient in bed awake, A/Ox4, speech clear, hearing intact, non-ambulatory at this time, continent of urine and stool. Last bowel movement was prior to admission. On room air, lung sounds clear, respirations unlabored. IV Dressings clean, dry, and intact. Patient has steri strips, 4X4, abd, and ace bandage on right leg. Clean, dry, and intact at this time. Radial and pedal pulses present on left leg, capillary refill <3 seconds to fingers and toes. Unable to assess right leg pedal and popliteal pulses due to ace wrap covering entire leg. Femoral pulse on right leg noted at +2. Abdomen soft, bowel sounds hyperactive. Patient reports frequent periods of nausea but has no emesis occurring. Moderate hand  noted to bilateral upper extremities. Side rails x3 up, bed locked and in lowest position, bed alarm on, call bell and bedside table within reach, needs attended, denies any SOB, or concerns at present. Patient reports headache and was given scheduled tylenol to assist. Could not give patient zofran due to times conflicting from last dose.

## 2024-08-28 NOTE — OP NOTE
[address]  [address]                            OPERATIVE REPORT      PATIENT NAME: HERIBERTO AYALA            : 1945  MED REC NO: [id number]                       ROOM: 326  ACCOUNT NO: [id number]                       ADMIT DATE: 2024  PROVIDER: David Jarrett DO    DATE OF SERVICE:  2024    PREOPERATIVE DIAGNOSES:  Inflammatory reaction versus infection, unicompartmental retained right knee prosthesis, and increased pain.    POSTOPERATIVE DIAGNOSES:  Inflammatory reaction versus infection, unicompartmental retained right knee prosthesis, and increased pain.    PROCEDURES PERFORMED:  Arthroscopic debridement and washout with culture, evaluation, aspiration as well as soft tissue for culture and evaluation.    SURGEON:  David Jarrett DO    ASSISTANT:  Cami Brown.    ANESTHESIA:  General anesthesia.    ESTIMATED BLOOD LOSS:  50 mL.    SPECIMENS REMOVED:  Per  list including tissue and swab for culture evaluation.    INTRAOPERATIVE FINDINGS:  Gross synovitis with some mild poly wear about retained medial compartment replacement.  No obvious signs of gross loosening upon interrogation with a large displaced lateral meniscal tear and general synovitis without purulence or of gross obvious changes for infection.     COMPLICATIONS:  None.    IMPLANTS:  No implants.    INDICATIONS:  pain with increased WBC    DESCRIPTION OF PROCEDURE:  This is an 79-year-old who presented to the office initially with increased difficulty with range of motion, history of unicompartmental replacement bilaterally several years ago with continued complaints of pain in the posterior aspect with baker cyst recurrence as well as pain and difficulty with range of motion.  We planned the possibility of converting to total knee from the retained unicompartmental replacement with mild osteolysis anticipated secondary to wear.  At that

## 2024-08-28 NOTE — PROGRESS NOTES
Noted stat MRSA culture ordered on 08/26 at 1130 not done yet. MRSA culture collected by Shalonda Sabillon RN and sent to lab.

## 2024-08-28 NOTE — PROGRESS NOTES
Occupational Therapy Goals:  Initiated 8/28/2024 to be met within 7-10 days.  Short Term Goals  Time Frame for Short Term Goals: 7 days  Short Term Goal 1: Patient will complete toileting with supervision.  Short Term Goal 2: Patient will complete LBD with mod I/I and AE PRN.  Short Term Goal 3: Patient will complete grooming in standing at sink for 3 minutes with supervision.  Short Term Goal 4: Patient will complete sponge bathing with supervision.    OCCUPATIONAL THERAPY EVALUATION    Patient: Lanny Elliott (79 y.o. female)  Date: 8/28/2024  Primary Diagnosis: Septic joint (HCC) [M00.9]  Procedure(s) (LRB):  RIGHT KNEE ARTHROSCOPIC WASHOUT (PT IN ROOM #326) (Right) 1 Day Post-Op   Precautions: Fall Risk, Weight Bearing, Right Lower Extremity Weight Bearing: Weight Bearing As Tolerated,  ,  ,  ,  ,  ,    PLOF: Patient completed ADLs independently     ASSESSMENT :  RN cleared patient for participation in OT evaluation. Patient presented supine in bed with gripper socks and IV line. Patient with visitor () present for entire session. Patient completed assessment of ADLs and BUE strength, ROM (see details below). Patient completed supine<>sit<>stand. Patient noted to be on wet bedding, bed linen changed while patient completed third sit to stand with GB RW. With each stand patient c/o being hot, nauseous. Patient given cool cloth, ice pack, A/C turned to maximal cool. Patient decreased ability to achieve neutral R knee positioning, c/o pain in back of knee, tendency to let leg externally rotate at rest, ice applied to leg, and pillow placed to limit external rotation. Knees of bed locked to promote extension. Patient reporting [place] does not work for her, recommend bedpan use or BSC with assist x2 for safety if patient able to tolerate.  Due to deficits (listed below) patient has decreased independence with ADLs and functional mobility and would benefit from SNF  ,      DEFICITS/IMPAIRMENTS:  Performance  Independent  Ambulation Assistance: Independent  Transfer Assistance: Independent  Active : No  Patient's  Info:  drives  Mode of Transportation: Car  Occupation: Retired  []  Right hand dominant   []  Left hand dominant    Cognitive/Behavioral Status:     Exceptions     Command Following: Follows one step commands with increased time        Decreased awareness of need for safety                Skin: ace wrap to RLE  Edema: in R foot    Vision/Perceptual:    Vision: Impaired                           Vision Exceptions: Wears glasses for reading   Coordination: BUE  Coordination: Within functional limits     Coordination: Generally decreased, functional      Balance:     Balance  Sitting: Intact  Standing: Impaired  Standing - Static: Fair  Standing - Dynamic: Fair    Strength: BUE  Strength: Generally decreased, functional  Strength: Within functional limits    Tone & Sensation: BUE  Tone: Normal  Sensation: Intact  Tone: Normal  Sensation: Intact    Range of Motion: BUE  AROM: Generally decreased, functional  PROM: Generally decreased, functional  AROM: Within functional limits  PROM: Within functional limits      Functional Mobility and Transfers for ADLs:  Bed Mobility:     Bed Mobility Training  Bed Mobility Training: Yes  Supine to Sit: Contact-guard assistance  Sit to Supine: Minimum assistance  Transfers:                 Transfer Training  Transfer Training: Yes  Stand to Sit: Minimum assistance;Moderate assistance  Stand Pivot Transfers: Minimum assistance    ADL Assessment:      Feeding: Independent  Grooming: Stand by assistance (bed level)  UE Bathing: Minimal assistance  LE Bathing: Moderate assistance  UE Dressing: Minimal assistance  LE Dressing: Moderate assistance  Toileting: Maximum assistance      Pain:  Pain level pre-treatment: 8/10   Pain level post-treatment: 9/10   Pain Intervention(s): Rest, Ice, Repositioning   Response to intervention: Nurse notified    Activity

## 2024-08-28 NOTE — PLAN OF CARE
Problem: Pain  Goal: Verbalizes/displays adequate comfort level or baseline comfort level  8/28/2024 1211 by Mahi Branch RN  Outcome: Progressing  8/27/2024 2300 by Shalonda Sabillon RN  Outcome: Progressing     Problem: Safety - Adult  Goal: Free from fall injury  8/28/2024 1211 by Mahi Branch RN  Outcome: Progressing  8/27/2024 2300 by Shalonda Sabillon RN  Outcome: Progressing     Problem: Skin/Tissue Integrity  Goal: Absence of new skin breakdown  Description: 1.  Monitor for areas of redness and/or skin breakdown  2.  Assess vascular access sites hourly  3.  Every 4-6 hours minimum:  Change oxygen saturation probe site  4.  Every 4-6 hours:  If on nasal continuous positive airway pressure, respiratory therapy assess nares and determine need for appliance change or resting period.  8/27/2024 2300 by Shalonda Sabillon, RN  Outcome: Progressing

## 2024-08-28 NOTE — PROGRESS NOTES
Case management Specialist assisting with discharge planning reached out to Dr. Jarrett office to confirm that the appointment was made on September 25,2024 at 11:00 am.

## 2024-08-29 LAB
ACID FAST STN SPEC: NEGATIVE
ANION GAP SERPL CALC-SCNC: 9 MMOL/L (ref 3–18)
BACTERIA SPEC CULT: NORMAL
BACTERIA SPEC CULT: NORMAL
BUN SERPL-MCNC: 9 MG/DL (ref 7–18)
BUN/CREAT SERPL: 20 (ref 12–20)
CALCIUM SERPL-MCNC: 9.1 MG/DL (ref 8.5–10.1)
CHLORIDE SERPL-SCNC: 107 MMOL/L (ref 100–111)
CO2 SERPL-SCNC: 26 MMOL/L (ref 21–32)
CREAT SERPL-MCNC: 0.46 MG/DL (ref 0.6–1.3)
GLUCOSE SERPL-MCNC: 135 MG/DL (ref 74–99)
MYCOBACTERIUM SPEC QL CULT: NORMAL
POTASSIUM SERPL-SCNC: 3.4 MMOL/L (ref 3.5–5.5)
SERVICE CMNT-IMP: NORMAL
SODIUM SERPL-SCNC: 142 MMOL/L (ref 136–145)
SPECIMEN PREPARATION: NORMAL
SPECIMEN SOURCE: NORMAL
VANCOMYCIN SERPL-MCNC: 14.4 UG/ML (ref 5–40)

## 2024-08-29 PROCEDURE — 2580000003 HC RX 258: Performed by: ORTHOPAEDIC SURGERY

## 2024-08-29 PROCEDURE — 6370000000 HC RX 637 (ALT 250 FOR IP): Performed by: HOSPITALIST

## 2024-08-29 PROCEDURE — 80048 BASIC METABOLIC PNL TOTAL CA: CPT

## 2024-08-29 PROCEDURE — 97110 THERAPEUTIC EXERCISES: CPT

## 2024-08-29 PROCEDURE — 97530 THERAPEUTIC ACTIVITIES: CPT

## 2024-08-29 PROCEDURE — 6370000000 HC RX 637 (ALT 250 FOR IP): Performed by: ORTHOPAEDIC SURGERY

## 2024-08-29 PROCEDURE — 2580000003 HC RX 258: Performed by: PHYSICIAN ASSISTANT

## 2024-08-29 PROCEDURE — 6360000002 HC RX W HCPCS: Performed by: INTERNAL MEDICINE

## 2024-08-29 PROCEDURE — 2500000003 HC RX 250 WO HCPCS: Performed by: HOSPITALIST

## 2024-08-29 PROCEDURE — 1100000000 HC RM PRIVATE

## 2024-08-29 PROCEDURE — 6360000002 HC RX W HCPCS: Performed by: PHYSICIAN ASSISTANT

## 2024-08-29 PROCEDURE — 6370000000 HC RX 637 (ALT 250 FOR IP): Performed by: INTERNAL MEDICINE

## 2024-08-29 PROCEDURE — 6360000002 HC RX W HCPCS: Performed by: ORTHOPAEDIC SURGERY

## 2024-08-29 PROCEDURE — 2580000003 HC RX 258: Performed by: INTERNAL MEDICINE

## 2024-08-29 PROCEDURE — 36415 COLL VENOUS BLD VENIPUNCTURE: CPT

## 2024-08-29 PROCEDURE — 80202 ASSAY OF VANCOMYCIN: CPT

## 2024-08-29 PROCEDURE — 6370000000 HC RX 637 (ALT 250 FOR IP): Performed by: PHYSICIAN ASSISTANT

## 2024-08-29 RX ORDER — MAGNESIUM SULFATE HEPTAHYDRATE 40 MG/ML
2000 INJECTION, SOLUTION INTRAVENOUS ONCE
Status: COMPLETED | OUTPATIENT
Start: 2024-08-29 | End: 2024-08-29

## 2024-08-29 RX ORDER — CEPHALEXIN 500 MG/1
1000 CAPSULE ORAL EVERY 12 HOURS
Qty: 12 CAPSULE | Refills: 0 | Status: SHIPPED | OUTPATIENT
Start: 2024-08-29 | End: 2024-09-06 | Stop reason: HOSPADM

## 2024-08-29 RX ORDER — OXYCODONE HYDROCHLORIDE 5 MG/1
5 TABLET ORAL EVERY 6 HOURS PRN
Qty: 28 TABLET | Refills: 0 | Status: SHIPPED | OUTPATIENT
Start: 2024-08-29 | End: 2024-09-06

## 2024-08-29 RX ORDER — POTASSIUM CHLORIDE 1500 MG/1
40 TABLET, EXTENDED RELEASE ORAL ONCE
Status: COMPLETED | OUTPATIENT
Start: 2024-08-29 | End: 2024-08-29

## 2024-08-29 RX ORDER — PREDNISONE 1 MG/1
4 TABLET ORAL DAILY
Qty: 12 TABLET | Refills: 0
Start: 2024-08-29 | End: 2024-09-06 | Stop reason: HOSPADM

## 2024-08-29 RX ADMIN — POTASSIUM CHLORIDE 40 MEQ: 1500 TABLET, EXTENDED RELEASE ORAL at 14:16

## 2024-08-29 RX ADMIN — POLYETHYLENE GLYCOL 3350 17 G: 17 POWDER, FOR SOLUTION ORAL at 21:04

## 2024-08-29 RX ADMIN — PIPERACILLIN AND TAZOBACTAM 4500 MG: 4; .5 INJECTION, POWDER, LYOPHILIZED, FOR SOLUTION INTRAVENOUS at 10:55

## 2024-08-29 RX ADMIN — PIPERACILLIN AND TAZOBACTAM 4500 MG: 4; .5 INJECTION, POWDER, LYOPHILIZED, FOR SOLUTION INTRAVENOUS at 21:03

## 2024-08-29 RX ADMIN — ENOXAPARIN SODIUM 40 MG: 100 INJECTION SUBCUTANEOUS at 09:02

## 2024-08-29 RX ADMIN — DOCUSATE SODIUM 100 MG: 100 CAPSULE, LIQUID FILLED ORAL at 09:01

## 2024-08-29 RX ADMIN — POLYETHYLENE GLYCOL 3350 17 G: 17 POWDER, FOR SOLUTION ORAL at 09:02

## 2024-08-29 RX ADMIN — DOCUSATE SODIUM 100 MG: 100 CAPSULE, LIQUID FILLED ORAL at 21:03

## 2024-08-29 RX ADMIN — MAGNESIUM SULFATE HEPTAHYDRATE 2000 MG: 40 INJECTION, SOLUTION INTRAVENOUS at 15:42

## 2024-08-29 RX ADMIN — ACETAMINOPHEN 650 MG: 325 TABLET ORAL at 14:15

## 2024-08-29 RX ADMIN — Medication 625 MG: at 09:25

## 2024-08-29 RX ADMIN — VANCOMYCIN HYDROCHLORIDE 750 MG: 750 INJECTION, POWDER, LYOPHILIZED, FOR SOLUTION INTRAVENOUS at 21:52

## 2024-08-29 RX ADMIN — PIPERACILLIN AND TAZOBACTAM 4500 MG: 4; .5 INJECTION, POWDER, LYOPHILIZED, FOR SOLUTION INTRAVENOUS at 14:26

## 2024-08-29 RX ADMIN — HYDRALAZINE HYDROCHLORIDE 10 MG: 20 INJECTION INTRAMUSCULAR; INTRAVENOUS at 00:12

## 2024-08-29 RX ADMIN — SODIUM CHLORIDE, PRESERVATIVE FREE 10 ML: 5 INJECTION INTRAVENOUS at 09:02

## 2024-08-29 RX ADMIN — ACETAMINOPHEN 650 MG: 325 TABLET ORAL at 05:55

## 2024-08-29 RX ADMIN — Medication 1 TABLET: at 09:02

## 2024-08-29 RX ADMIN — SODIUM CHLORIDE, POTASSIUM CHLORIDE, SODIUM LACTATE AND CALCIUM CHLORIDE: 600; 310; 30; 20 INJECTION, SOLUTION INTRAVENOUS at 06:32

## 2024-08-29 RX ADMIN — MELOXICAM 15 MG: 7.5 TABLET ORAL at 09:02

## 2024-08-29 RX ADMIN — ONDANSETRON 4 MG: 2 INJECTION INTRAMUSCULAR; INTRAVENOUS at 02:52

## 2024-08-29 RX ADMIN — Medication 625 MG: at 21:20

## 2024-08-29 RX ADMIN — VANCOMYCIN HYDROCHLORIDE 750 MG: 750 INJECTION, POWDER, LYOPHILIZED, FOR SOLUTION INTRAVENOUS at 09:15

## 2024-08-29 RX ADMIN — SODIUM CHLORIDE, POTASSIUM CHLORIDE, SODIUM LACTATE AND CALCIUM CHLORIDE: 600; 310; 30; 20 INJECTION, SOLUTION INTRAVENOUS at 15:39

## 2024-08-29 RX ADMIN — PANTOPRAZOLE SODIUM 40 MG: 40 TABLET, DELAYED RELEASE ORAL at 05:55

## 2024-08-29 RX ADMIN — ACETAMINOPHEN 650 MG: 325 TABLET ORAL at 00:09

## 2024-08-29 RX ADMIN — PREDNISONE 4 MG: 1 TABLET ORAL at 09:02

## 2024-08-29 RX ADMIN — ACETAMINOPHEN 650 MG: 325 TABLET ORAL at 21:04

## 2024-08-29 ASSESSMENT — PAIN DESCRIPTION - LOCATION
LOCATION: LEG
LOCATION: KNEE
LOCATION: KNEE

## 2024-08-29 ASSESSMENT — PAIN DESCRIPTION - ORIENTATION
ORIENTATION: RIGHT
ORIENTATION: RIGHT

## 2024-08-29 ASSESSMENT — PAIN SCALES - GENERAL
PAINLEVEL_OUTOF10: 3
PAINLEVEL_OUTOF10: 4
PAINLEVEL_OUTOF10: 10

## 2024-08-29 ASSESSMENT — PAIN DESCRIPTION - DESCRIPTORS
DESCRIPTORS: ACHING

## 2024-08-29 ASSESSMENT — PAIN DESCRIPTION - FREQUENCY: FREQUENCY: INTERMITTENT

## 2024-08-29 ASSESSMENT — PAIN - FUNCTIONAL ASSESSMENT: PAIN_FUNCTIONAL_ASSESSMENT: ACTIVITIES ARE NOT PREVENTED

## 2024-08-29 NOTE — PROGRESS NOTES
Remote access to review micro and noted intraoperative cultures are growing gram negative rods. Discussed by phone with Dr. Diana this morning. This changes the treatment plan. After discussion with Dr. Jarrett, we will now plan for additional surgery on Thursday 9/5/2024. This will be an explant of her medial compartment arthroplasty with antibiotic spacer and all poly tibial base. Called in to patient's room and discussed this with her. She has voiced understanding. She will remain in the hospital through the weekend to continue to follow cultures and adjust antibiotic course as needed per Dr. Diana with infectious disease.

## 2024-08-29 NOTE — PROGRESS NOTES
Progress Note      Patient: Lanny Elliott               Sex: female          DOA: 8/24/2024       YOB: 1945      Age:  79 y.o.        LOS:  LOS: 5 days     Status Post: Procedure(s):  RIGHT KNEE ARTHROSCOPIC WASHOUT (PT IN ROOM #326)  Surgery Date: 08/27/2024         Subjective:     Lanny Elliott is a 79 y.o. female who c/o moderate right knee pain. She reports being able to move a little easier in bed. Still concerned with ambulation d/t pain. Continued hypersensitivity and pain with movement or touch.    Objective:      Visit Vitals  /65   Pulse 87   Temp 98 °F (36.7 °C) (Oral)   Resp 16   Ht 1.651 m (5' 5\")   Wt 57.6 kg (127 lb)   SpO2 93%   BMI 21.13 kg/m²       Physical Exam:   Dressing:  clean, dry, intact   Wiggles Toes/Ankle  Foot sensation intact to light touch  No foot edema/ +1 Posterior Tibial Pulse    Intake and Output:  Current Shift:  No intake/output data recorded.  Last three shifts:  08/27 1901 - 08/29 0700  In: 370 [P.O.:120]  Out: 2600 [Urine:2600]  Voiding Status:  +void without need for danielle catheter    Lab/Data Reviewed:  Recent Labs     08/28/24  0638 08/29/24  0624   HGB 10.4*  --    HCT 30.3*  --     142   K 3.9 3.4*    107   CO2 25 26   BUN 9 9       Medications Reviewed    Assessment/Plan     Principal Problem:    Septic joint (HCC)  Active Problems:    Temporal arteritis (HCC)    Hypokalemia    HTN (hypertension)    Constipation  Resolved Problems:    * No resolved hospital problems. *      Discharge Planning for Rehab today - pending PT recommendations  Discussed with Dr. Diana, ID, ok to d/c with oral antibiotic coverage for a few days pending final culture report.  Ok to loosen ACE wrap and rewrap to help with swelling  Continue OOB and WBAT with PT  Patient will follow up in 10 days with Dr. Jarrett  Hard copy Rx on chart           The patient's plan of care was discussed with Dr. Jarrett today as well and he is in agreement with above.

## 2024-08-29 NOTE — PROGRESS NOTES
[facility]   Pharmacy Pharmacokinetic Monitoring Service - Vancomycin    Consulting Provider: Dr. Licea   Indication: Bone and Joint Infection  Target Concentration: Goal AUC/FUNMI 400-600 mg*hr/L  Day of Therapy: 5  Additional Antimicrobials: Zosyn    Pertinent Laboratory Values:   Wt Readings from Last 1 Encounters:   08/26/24 57.6 kg (127 lb)     Temp Readings from Last 1 Encounters:   08/29/24 98.2 °F (36.8 °C) (Oral)     Estimated Creatinine Clearance: 86 mL/min (A) (based on SCr of 0.46 mg/dL (L)).  Recent Labs     08/28/24  0638 08/29/24  0624   CREATININE 0.46* 0.46*   BUN 9 9   WBC 10.9  --      Recent vancomycin administrations                     vancomycin (VANCOCIN) 750 mg in sodium chloride 0.9 % 250 mL IVPB (vial-mate) (mg) 750 mg New Bag 08/29/24 0915     750 mg New Bag 08/28/24 2033     750 mg New Bag  0901     750 mg New Bag 08/27/24 2206     750 mg New Bag  1110     750 mg New Bag 08/26/24 2135                Assessment:  Date/Time Current Dose Concentration Timing of Concentration (h) AUC (ss)   8/29/24 at 11:35 Vancomycin 750 mg IV q12h Random level = 14.4 8/29 at 06:24 440 mg/L.hr   Note: Serum concentrations collected for AUC dosing may appear elevated if collected in close proximity to the dose administered, this is not necessarily an indication of toxicity    Plan:  Current dosing regimen is therapeutic  Continue current dose:  Vancomycin 750 mg IV q12h        Est AUC (ss): 440 mg/L.hr    Est trough (ss): 11.6 mg/L  Pharmacy will continue to monitor patient and adjust therapy as indicated    Thank you for the consult,  Collette Chauhan, PharmD  8/29/2024 11:35 AM

## 2024-08-29 NOTE — PROGRESS NOTES
Hospitalist Progress Note-critical care note     Patient: Lanny Elliott MRN: [id number]  [facility]: [id number]    YOB: 1945  Age: 79 y.o.  Sex: female    DOA: 8/24/2024 LOS:  LOS: 5 days            Chief complaint: septic joint hypokalemia , htn ,     Assessment/Plan         Active Hospital Problems    Diagnosis Date Noted    Constipation [K59.00] 08/28/2024    Septic joint (HCC) [M00.9] 08/25/2024    Temporal arteritis (HCC) [M31.6] 08/25/2024    Hypokalemia [E87.6] 08/25/2024    HTN (hypertension) [I10] 08/25/2024        Knee effusion  Plan is for washout on  8/27 -culture sent   ID consult already on Vanc/zosyn-change to ancef and vanc -change back vanc and zosyn because bcx 1/2 g-   Follow sed rate/esr  Drop Prednisone to 4mg per patient   Last dose of 8/13 of Actemra   Replacement scheduled for 9/10    Positive bcx : 1/2 bottle g- brian on  zosyn now      Right knee pain with swelling possible septic joint with osteolysis  So far cx negative,    infectious disease consultation  See above   procedure and ortho approval     Temporal arthritis last Actemra dose August 13  Resume prednisone 4 mg daily  Follow sed rate and CRP        Hypokalemia, replaced 3.4 today      Hypertension aggravated by pain but not on regular medicines  Hydralazine as needed  EKG August 7 with normal  echo done with normal pasp 37        Constipation : bm today, continue  colace, miralax and increase physical activity     Subjective I want to go to rehab close to my house     TIME: E/M Time spent with patient and patient care issues: [] 31-40 mins  [x] 41-49 mins  [] 50 mins or more.      Disposition :per primary team   Review of systems:    General: No fevers or chills.  Cardiovascular: No chest pain or pressure. No palpitations.   Pulmonary: No shortness of breath.   Gastrointestinal: No nausea, vomiting.   Msk : knee pain     Vital signs/Intake and Output:  Visit Vitals  BP (!) 149/64   Pulse 95   Temp 99.4 °F (37.4 °C)

## 2024-08-29 NOTE — PLAN OF CARE
Problem: Pain  Goal: Verbalizes/displays adequate comfort level or baseline comfort level  8/29/2024 0055 by Nella Manrique RN  Outcome: Progressing  8/28/2024 1211 by Mahi Branch RN  Outcome: Progressing     Problem: Safety - Adult  Goal: Free from fall injury  8/29/2024 0055 by Nella Manrique RN  Outcome: Progressing  8/28/2024 1211 by Mahi Branch RN  Outcome: Progressing     Problem: Skin/Tissue Integrity  Goal: Absence of new skin breakdown  Description: 1.  Monitor for areas of redness and/or skin breakdown  2.  Assess vascular access sites hourly  3.  Every 4-6 hours minimum:  Change oxygen saturation probe site  4.  Every 4-6 hours:  If on nasal continuous positive airway pressure, respiratory therapy assess nares and determine need for appliance change or resting period.  Outcome: Progressing

## 2024-08-29 NOTE — PROGRESS NOTES
Gram negative rods --positive from 1/2 cultures    Bacterial PCR was requested to lab- from OR fluid/tissue-- they will be sending to [facility]    Pip marcia  With positive OR culture, ID pending. 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

## 2024-08-29 NOTE — DISCHARGE SUMMARY
DISCHARGE SUMMARY    Patient: Lanny Elliott MRN: [id number]  CSN: [id number]    YOB: 1945  Age: 79 y.o.  Sex: female              Admit Date: 2024    Discharge Date: 2024    Admission Diagnoses: Septic joint (HCC) [M00.9]    Discharge Diagnoses:  Right knee s/p Revision RTKA with explant hardware and implant articulating spacer    Discharge Condition: Fair    Hospital Course:  On the day of admission the patient underwent a Right knee arthroscopic washout that was done under general anesthesia without complications. No obvious signs of infection, intraoperative culture pending. The patient was started on Lovenox 40 mg SQ daily for anti embolism prophylaxis. The patient was voiding spontaneously without need for a Jordan catheter. The patient was continued on vancomycin for possibility of prosthetic joint infection. Physical Therapy was begun postoperatively with patient noting significant limitations and slow progress with weightbearing and motion secondary to pain. Intraoperative cultures grew E. Coli with pansensitivity. Patient on Rocephin. Taken back to OR on 2024 for revision Right TKA with explant of hardware and implant of articulating spacer with antibiotic beads. On POD # 1 patient's dressing was clean, dry and intact. Patient was able to wiggle their toes and ankle with sensation intact to light touch. The patient had no edema with a present 2+ posterior tibialis pulse. On POD #2 patient will be rounded on by one of Dr. Jarrett's partners. As long as patient is orthopaedically and medically stable she will discharged to Skilled nursing facility.  Patient's hemoglobins were      Recent Labs     24  0535 24  0457 24  0316   HGB 11.1* 11.2* 9.1*   . Patient temp max was.  Temp (72hrs), Av.1 °F (36.7 °C), Min:97.3 °F (36.3 °C), Max:99.1 °F (37.3 °C)  .     Discharge Medications:   Current Discharge Medication List             Details   docusate sodium  (COLACE, DULCOLAX) 100 MG CAPS Take 100 mg by mouth 2 times daily as needed for Constipation  Qty: 6 capsule, Refills: 0      celecoxib (CELEBREX) 200 MG capsule Take 1 capsule by mouth 2 times daily for 30 doses  Qty: 30 capsule, Refills: 0      oxyCODONE (ROXICODONE) 5 MG immediate release tablet Take 1-2 tablets by mouth every 6 hours as needed for Pain for up to 7 days. Max Daily Amount: 40 mg  Qty: 42 tablet, Refills: 0    Comments: Reduce doses taken as pain becomes manageable. Supervising Physician: David Jarrett DO  Associated Diagnoses: S/P arthroscopy of right knee; S/P revision of total knee, right      enoxaparin (LOVENOX) 40 MG/0.4ML Inject 0.4 mLs into the skin daily for 21 days  Qty: 8.4 mL, Refills: 0      ceFAZolin (ANCEF) infusion Infuse 1,000 mg intravenously in the morning and 1,000 mg at noon and 1,000 mg in the evening. Compound per protocol.  Qty: 120 g, Refills: 0                Details   predniSONE (DELTASONE) 1 MG tablet Take 4 tablets by mouth daily for 10 days  Qty: 40 tablet, Refills: 0                Details   sulindac (CLINORIL) 200 MG tablet Take 1 tablet by mouth 2 times daily      acetaminophen (TYLENOL) 500 MG tablet Take 2 tablets by mouth every 6 hours as needed for Pain      CRANBERRY PO Take 1 capsule by mouth daily      Omega-3 Fatty Acids (FISH OIL PO) Take 1 capsule by mouth 2 times daily      Multiple Vitamins-Minerals (THERAPEUTIC MULTIVITAMIN-MINERALS) tablet Take 1 tablet by mouth daily      polycarbophil (FIBERCON) 625 MG tablet Take 1 tablet by mouth 2 times daily      omeprazole (PRILOSEC) 20 MG delayed release capsule Take 1 capsule by mouth daily             CEFAZOLIN STOP DATE - 10/16/2024 per Infectious Disease, Dr. Diana    Activity: WBAT with walker, focus on ROM in right knee    Diet: regular diet    Wound Care: keep wound clean and dry, reinforce dressing PRN, and ice to area for comfort    Follow-up: 10 day post-op in the office with Dr. Jarrett for X-rays

## 2024-08-29 NOTE — PROGRESS NOTES
Physical Therapy Goals:  Initiated 8/29/2024 to be met within 7-10 days.  Short Term Goals  Short Term Goal 1: Patient will perform supine to/from sit with SBA  Short Term Goal 2: Patient will perform sit to/from stand with SB-CGA in preperation for gait progression  Short Term Goal 3: Patient will ambulate 100 ft with RW and SB-CGA to progress OOB mobility  Short Term Goal 4: Patient will negotiate 3 stairs with handrails and CGA to simulate home entry    []  Patient has met MD nichole wu for d/c home   []  Recommend HH with 24 hour adult care   [x]  Benefit from additional acute PT sessions      PHYSICAL THERAPY TREATMENT    Patient: Lanny Elliott (79 y.o. female)  Date: 8/29/2024  Diagnosis: Septic joint (HCC) [M00.9] Septic joint (HCC)  Procedure(s) (LRB):  RIGHT KNEE ARTHROSCOPIC WASHOUT (PT IN ROOM #326) (Right) 2 Days Post-Op  Precautions: Fall Risk, Weight Bearing as tolerated R LE    ASSESSMENT:  Pt performed performed bed mobility with Yayo. Patient performed supine there ex for LE strengthening. Patient continues to maintain R LE in hip external rotation, knee flexion, and ankle plantarflexion. Spent extended time and effort with stretching and positioning of R LE to attempt to achieve neutral. Patient very resistant however some improvement noted with efforts. Patient with nausea limiting OOB mobility at this time. Will continue to follow patient and progress mobility as tolerated.    Progression toward goals:   []      Improving appropriately and progressing toward goals  [x]      Improving slowly and progressing toward goals  []      Not making progress toward goals and plan of care will be adjusted     PLAN:  Patient continues to benefit from skilled intervention to address the above impairments.  Continue treatment per established plan of care.    Further Equipment Recommendations for Discharge: TBD at next level of care    [facility]: 13/20    This [facility] score should be considered in

## 2024-08-29 NOTE — CARE COORDINATION
08/28/24 1519   /Social Work Whiteboard Notes   /Social Work Whiteboard YELLOW 8/29 recs for SNF, Referrals sent         Pt with a preference for [facility] who is reviewing pts referral. SW requested additional preferences. Per the pt, her list got wet and it was thrown away. SW provided pt with an additional list.    SW to follow up.    Eduardo Vo, RASHID  Case Management Department

## 2024-08-29 NOTE — PROGRESS NOTES
0715  Bedside and Verbal shift change report given to SHABANA Kirkland (oncoming nurse) by SHABANA Carmen (offgoing nurse). Report included the following information Nurse Handoff Report, Adult Overview, Intake/Output, MAR, and Recent Results.

## 2024-08-29 NOTE — PROGRESS NOTES
2000  Patient in bed awake, A/Ox4, speech clear, hearing intact, bed rest, continent of urine and stool. On room air, lung sounds clear, respirations unlabored. Skin is warm, dry, with incision to RLE. Dressings clean, dry, and intact. Swelling noted to RLE. Radial pulses present bilaterally. Abdomen soft, bowel sounds active. Moderate hand  noted to bilateral upper extremities. Side rails x3 up, bed locked and in lowest position, bed alarm on, call bell and bedside table within reach, needs attended, denies any pain, SOB, or concerns at present.    0010  BP elevated. Patient straining trying to have a bowel movement on bed pan. Hydralazine given.    Vitals:    08/28/24 2357   BP: (!) 171/70   Pulse: 70   Resp: 16   Temp: 98 °F (36.7 °C)   SpO2: 97%     Noted that patient is tolerating a bit better turning to sides tonight compared to yesterday night. Able to turn by herself with minimal assistance.

## 2024-08-29 NOTE — PROGRESS NOTES
[facility]  (A Division of [facility])                                                           Date of Admission: 8/24/2024       Reason for Consult: Possible Right knee infection  Referring MD: Dr Suarez    C/C: right knee pain/swelling    Current Antimicrobials:    Prior Antimicrobials:    Vanco 8/25 to date  #4  Cefepime 8/29   Zosyn IV  8/25 to 27   Allergy to antibiotics: NA       Assessment--ID related:     Immunosuppressed patient, due to Tociluzumab treatment with:    Probable R knee TKA infection? late.   -- DDX with mechanical etiology/deg changes, but is now growing GNR  S/P OR 8/27--arthroscopic debridment and washout--gross synovitis, large and displaced lateral meniscal tear with no finding of purulence or gross infection per op note  --BL partial TKA >20 years ago  --Right partial TKA- pain from 03/2023  --increasing swelling, unable to walk from last week.   --denies prior po abx, prior injection to her knee  --joint tap 05/2024-- WBC bloody- 3375- 91% N, no growth. 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 COMPARISON: 7/3/2024.   IMPRESSION: Partial right knee replacement. 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 (  Previously identified. Periprosthetic osteolysis within the medial aspect of the proximal tibia has increased in comparison to the previous study, now measuring 3.3 x 2.6 cm in diameter compared to 2.2 x 1.7 cm. There is no evidence acute fracture identified)    Co-morbidities:    TA on immunosuppression-  treatment X1 year.

## 2024-08-29 NOTE — CARE COORDINATION
08/29/24 1228   /Social Work Whiteboard Notes   /Social Work Whiteboard YELLOW 8/29 accepted at [facility]  SNF, can admit tomorrow when a bed is available         Admissions rep is Vicky [phone]. Pt can admit early to SNF if she is medically ready.     Pt made aware of acceptance and is in agreement with the plan.     ADDENDUM @ 2:41    Pt now likely to be hospitalized throughout the weekend.   Per Ortho, plan is now for additional surgery scheduled Thursday 9/5/2024 for explant of her medial compartment arthroplasty with antibiotic spacer and all poly tibial base.     RASHID David  Case Management Department

## 2024-08-29 NOTE — PLAN OF CARE
Problem: Pain  Goal: Verbalizes/displays adequate comfort level or baseline comfort level  8/29/2024 1148 by Marita Bustillo RN  Outcome: Progressing  8/29/2024 0055 by Nella Manrique RN  Outcome: Progressing     Problem: Safety - Adult  Goal: Free from fall injury  8/29/2024 1148 by Marita Bustillo RN  Outcome: Progressing  8/29/2024 0055 by Nella Manrique RN  Outcome: Progressing     Problem: Skin/Tissue Integrity  Goal: Absence of new skin breakdown  Description: 1.  Monitor for areas of redness and/or skin breakdown  2.  Assess vascular access sites hourly  3.  Every 4-6 hours minimum:  Change oxygen saturation probe site  4.  Every 4-6 hours:  If on nasal continuous positive airway pressure, respiratory therapy assess nares and determine need for appliance change or resting period.  8/29/2024 1148 by Marita Bustillo RN  Outcome: Progressing  8/29/2024 0055 by Nella Manrique RN  Outcome: Progressing

## 2024-08-29 NOTE — DISCHARGE INSTRUCTIONS
Dr. Jarrett’s Post Operative Instructions Revision Total Knee Replacement    ACTIVITIES :  1.  You may be up and walking about the house with your walker.   2.  Activities around the house, such as washing dishes, fixing light meals, and your own personal care are fine.   3.  Avoid strenuous activities, such as vacuuming, lifting laundry or grocery bags.   4.  Walking is the best way to rebuild strength and stamina. Start SLOWLY and gradually increase your distance.  5.  Avoid any jogging, running or excessive stair-climbing   6.  Your home physical therapist will work with you and your range-of-motion for the first 7-14 days.  After your first visit with Dr. Jarrett you will be scheduled for out-patient physical therapy at a site convenient for you.  7.  Follow-up with Dr. Jarrett in 14 days.    BATHING and INCISION CARE:  1.  Do not remove bandage until first post-op visit in office  2.  The incision may be tender to touch or feel numb: this is normal.   3.  Keep the incision clean and dry “no showering” until your follow-up appointment. The incision is closed with sutures that will be removed in office.   4.  Do not apply any lotions, ointments or oils on the incision.   5.  If you notice any excessive swelling, redness, or persistent drainage around the incision, notify the office immediately.    DRIVIN.  You should not drive until after your follow-up appointment.   2.  You can be in a vehicle for short distances, but if you travel any long distance, please stop about every 30 minutes and walk/stretch.   3.  You should NEVER drive while taking narcotic medication.  4.  Driving will be permitted on right knee replacements after the therapist has confirmed a range-of-motion of a 105 degrees.  Left knee replacements may drive at 2 weeks post-op.    RETURN TO WORK :  1. The decision to return to work will be determined on an individual basis.   2.  Many people who have a

## 2024-08-30 LAB
ACID FAST STN SPEC: NEGATIVE
ANION GAP SERPL CALC-SCNC: 4 MMOL/L (ref 3–18)
BACTERIA SPEC CULT: NORMAL
BUN SERPL-MCNC: 9 MG/DL (ref 7–18)
BUN/CREAT SERPL: 19 (ref 12–20)
CALCIUM SERPL-MCNC: 8.5 MG/DL (ref 8.5–10.1)
CHLORIDE SERPL-SCNC: 110 MMOL/L (ref 100–111)
CO2 SERPL-SCNC: 27 MMOL/L (ref 21–32)
CREAT SERPL-MCNC: 0.48 MG/DL (ref 0.6–1.3)
GLUCOSE SERPL-MCNC: 91 MG/DL (ref 74–99)
MYCOBACTERIUM SPEC QL CULT: NORMAL
POTASSIUM SERPL-SCNC: 3.7 MMOL/L (ref 3.5–5.5)
SERVICE CMNT-IMP: NORMAL
SODIUM SERPL-SCNC: 141 MMOL/L (ref 136–145)
SPECIMEN PREPARATION: NORMAL
SPECIMEN SOURCE: NORMAL

## 2024-08-30 PROCEDURE — 6370000000 HC RX 637 (ALT 250 FOR IP): Performed by: HOSPITALIST

## 2024-08-30 PROCEDURE — 6360000002 HC RX W HCPCS: Performed by: PHYSICIAN ASSISTANT

## 2024-08-30 PROCEDURE — 2580000003 HC RX 258: Performed by: INTERNAL MEDICINE

## 2024-08-30 PROCEDURE — 36415 COLL VENOUS BLD VENIPUNCTURE: CPT

## 2024-08-30 PROCEDURE — 2580000003 HC RX 258: Performed by: PHYSICIAN ASSISTANT

## 2024-08-30 PROCEDURE — 1100000000 HC RM PRIVATE

## 2024-08-30 PROCEDURE — 2580000003 HC RX 258: Performed by: ORTHOPAEDIC SURGERY

## 2024-08-30 PROCEDURE — 6360000002 HC RX W HCPCS: Performed by: INTERNAL MEDICINE

## 2024-08-30 PROCEDURE — 6370000000 HC RX 637 (ALT 250 FOR IP): Performed by: INTERNAL MEDICINE

## 2024-08-30 PROCEDURE — 6370000000 HC RX 637 (ALT 250 FOR IP): Performed by: ORTHOPAEDIC SURGERY

## 2024-08-30 PROCEDURE — 80048 BASIC METABOLIC PNL TOTAL CA: CPT

## 2024-08-30 PROCEDURE — 6370000000 HC RX 637 (ALT 250 FOR IP): Performed by: PHYSICIAN ASSISTANT

## 2024-08-30 PROCEDURE — 6360000002 HC RX W HCPCS: Performed by: ORTHOPAEDIC SURGERY

## 2024-08-30 RX ORDER — MINERAL OIL AND WHITE PETROLATUM 150; 830 MG/G; MG/G
OINTMENT OPHTHALMIC PRN
Status: DISCONTINUED | OUTPATIENT
Start: 2024-08-30 | End: 2024-09-07 | Stop reason: HOSPADM

## 2024-08-30 RX ADMIN — SODIUM CHLORIDE, PRESERVATIVE FREE 10 ML: 5 INJECTION INTRAVENOUS at 20:30

## 2024-08-30 RX ADMIN — ENOXAPARIN SODIUM 40 MG: 100 INJECTION SUBCUTANEOUS at 08:30

## 2024-08-30 RX ADMIN — DOCUSATE SODIUM 100 MG: 100 CAPSULE, LIQUID FILLED ORAL at 08:30

## 2024-08-30 RX ADMIN — Medication 625 MG: at 14:23

## 2024-08-30 RX ADMIN — SODIUM CHLORIDE, POTASSIUM CHLORIDE, SODIUM LACTATE AND CALCIUM CHLORIDE: 600; 310; 30; 20 INJECTION, SOLUTION INTRAVENOUS at 03:06

## 2024-08-30 RX ADMIN — Medication 1 TABLET: at 08:29

## 2024-08-30 RX ADMIN — SODIUM CHLORIDE, PRESERVATIVE FREE 10 ML: 5 INJECTION INTRAVENOUS at 08:31

## 2024-08-30 RX ADMIN — MELOXICAM 15 MG: 7.5 TABLET ORAL at 08:29

## 2024-08-30 RX ADMIN — Medication 625 MG: at 20:37

## 2024-08-30 RX ADMIN — ACETAMINOPHEN 650 MG: 325 TABLET ORAL at 06:28

## 2024-08-30 RX ADMIN — POLYETHYLENE GLYCOL 3350 17 G: 17 POWDER, FOR SOLUTION ORAL at 08:30

## 2024-08-30 RX ADMIN — PIPERACILLIN AND TAZOBACTAM 4500 MG: 4; .5 INJECTION, POWDER, LYOPHILIZED, FOR SOLUTION INTRAVENOUS at 10:05

## 2024-08-30 RX ADMIN — VANCOMYCIN HYDROCHLORIDE 750 MG: 750 INJECTION, POWDER, LYOPHILIZED, FOR SOLUTION INTRAVENOUS at 08:55

## 2024-08-30 RX ADMIN — ACETAMINOPHEN 650 MG: 325 TABLET ORAL at 13:00

## 2024-08-30 RX ADMIN — ACETAMINOPHEN 650 MG: 325 TABLET ORAL at 19:03

## 2024-08-30 RX ADMIN — PIPERACILLIN AND TAZOBACTAM 4500 MG: 4; .5 INJECTION, POWDER, LYOPHILIZED, FOR SOLUTION INTRAVENOUS at 03:06

## 2024-08-30 RX ADMIN — WATER 1000 MG: 1 INJECTION INTRAMUSCULAR; INTRAVENOUS; SUBCUTANEOUS at 16:18

## 2024-08-30 RX ADMIN — PREDNISONE 4 MG: 1 TABLET ORAL at 08:29

## 2024-08-30 RX ADMIN — SODIUM CHLORIDE, POTASSIUM CHLORIDE, SODIUM LACTATE AND CALCIUM CHLORIDE: 600; 310; 30; 20 INJECTION, SOLUTION INTRAVENOUS at 14:21

## 2024-08-30 RX ADMIN — PANTOPRAZOLE SODIUM 40 MG: 40 TABLET, DELAYED RELEASE ORAL at 06:28

## 2024-08-30 RX ADMIN — OXYCODONE HYDROCHLORIDE 10 MG: 5 TABLET ORAL at 20:36

## 2024-08-30 RX ADMIN — OXYCODONE HYDROCHLORIDE 10 MG: 5 TABLET ORAL at 09:10

## 2024-08-30 ASSESSMENT — PAIN DESCRIPTION - LOCATION
LOCATION: KNEE
LOCATION: KNEE
LOCATION: BACK;KNEE

## 2024-08-30 ASSESSMENT — PAIN DESCRIPTION - ONSET: ONSET: ON-GOING

## 2024-08-30 ASSESSMENT — PAIN DESCRIPTION - ORIENTATION
ORIENTATION: RIGHT
ORIENTATION: INNER
ORIENTATION: RIGHT

## 2024-08-30 ASSESSMENT — PAIN SCALES - GENERAL
PAINLEVEL_OUTOF10: 9
PAINLEVEL_OUTOF10: 0
PAINLEVEL_OUTOF10: 5
PAINLEVEL_OUTOF10: 10

## 2024-08-30 ASSESSMENT — PAIN - FUNCTIONAL ASSESSMENT
PAIN_FUNCTIONAL_ASSESSMENT: PREVENTS OR INTERFERES WITH MANY ACTIVE NOT PASSIVE ACTIVITIES
PAIN_FUNCTIONAL_ASSESSMENT: ACTIVITIES ARE NOT PREVENTED

## 2024-08-30 ASSESSMENT — PAIN DESCRIPTION - DESCRIPTORS
DESCRIPTORS: ACHING
DESCRIPTORS: ACHING
DESCRIPTORS: THROBBING

## 2024-08-30 ASSESSMENT — PAIN DESCRIPTION - PAIN TYPE: TYPE: SURGICAL PAIN

## 2024-08-30 ASSESSMENT — PAIN DESCRIPTION - DIRECTION: RADIATING_TOWARDS: FOOT

## 2024-08-30 ASSESSMENT — PAIN DESCRIPTION - FREQUENCY: FREQUENCY: INTERMITTENT

## 2024-08-30 NOTE — PROGRESS NOTES
2100  Patient in bed awake, A/O x4, speech clear, hearing intact, non-ambulatory at this time (patient has consults with PT/OT and has not gotten up currently), continent of urine and stool. Patient's last bowel movement was 8/29. Patient has new pure wick in place set to wall suction. On room air, lung sounds clear, respirations unlabored. Dressings clean, dry, and intact. Dressing on knee include steri strips, abd, and then ace wrapped. Radial and pedal pulses present bilaterally, capillary refill <3 seconds to fingers and toes. Abdomen soft, bowel sounds active. Moderate hand  noted to bilateral upper extremities. Side rails x3 up, bed locked and in lowest position, bed alarm on, call bell and bedside table within reach, needs attended, denies any pain, SOB, or concerns at present.      0453  Patient bed bath was completed. Patient's purewick was replaced with a new one, new gown, new linens. Patient brushed her teeth and used mouthwash and nurse assisted patient in combing her hair.

## 2024-08-30 NOTE — PROGRESS NOTES
Hospitalist Progress Note-critical care note     Patient: Lanny Elliott MRN: [id number]  CSN: [id number]    YOB: 1945  Age: 79 y.o.  Sex: female    DOA: 8/24/2024 LOS:  LOS: 6 days            Chief complaint: septic joint hypokalemia , htn ,     Assessment/Plan         Active Hospital Problems    Diagnosis Date Noted    Constipation [K59.00] 08/28/2024    Septic joint (HCC) [M00.9] 08/25/2024    Temporal arteritis (HCC) [M31.6] 08/25/2024    Hypokalemia [E87.6] 08/25/2024    HTN (hypertension) [I10] 08/25/2024        Knee effusion  Plan is for washout on  8/27 -culture sent   ID consult already on Vanc/zosyn-change to ancef and vanc -change back vanc and zosyn because bcx 1/2 g-   Follow sed rate/esr  Drop Prednisone to 4mg per patient   Last dose of 8/13 of Actemra   Replacement scheduled for 9/10    Positive bcx : 1/2 bottle g- brian on  zosyn now   Wound cx ecoli will continue f/u with ID recommendation      Right knee pain with swelling possible septic joint with osteolysis  So far cx negative,    infectious disease consultation  See above   procedure and ortho approval     Temporal arthritis last Actemra dose August 13  Resume prednisone 4 mg daily  Follow sed rate and CRP        Hypokalemia, replaced 3.4 today      Hypertension aggravated by pain but not on regular medicines  Hydralazine as needed  EKG August 7 with normal  echo done with normal pasp 37        Constipation : I got big BM     Subjective I want to go to rehab close to my house     TIME: E/M Time spent with patient and patient care issues: [] 31-40 mins  [x] 41-49 mins  [] 50 mins or more.      Disposition :per primary team   Review of systems:    General: No fevers or chills.  Cardiovascular: No chest pain or pressure. No palpitations.   Pulmonary: No shortness of breath.   Gastrointestinal: No nausea, vomiting.   Msk : knee pain     Vital signs/Intake and Output:  Visit Vitals  BP (!) 144/69   Pulse 89   Temp 98.3 °F (36.8 °C)

## 2024-08-30 NOTE — PROGRESS NOTES
[facility]  (A Division of [facility])                                                           Date of Admission: 8/24/2024       Reason for Consult: Possible Right knee infection  Referring MD: Dr Suarez    C/C: right knee pain/swelling    Current Antimicrobials:    Prior Antimicrobials:    Vanco 8/25 to date  #4  Cefepime 8/29   Zosyn IV  8/25 to 27   Allergy to antibiotics: NA       Assessment--ID related:     Immunosuppressed patient, due to Tociluzumab treatment with:    Probable R knee TKA infection? late.   -- DDX with mechanical etiology/deg changes, E.coli- pansensitive  S/P OR 8/27--arthroscopic debridment and washout--gross synovitis, large and displaced lateral meniscal tear with no finding of purulence or gross infection per op note  --BL partial TKA >20 years ago  --Right partial TKA- pain from 03/2023  --increasing swelling, unable to walk from last week.   --denies prior po abx, prior injection to her knee  --joint tap 05/2024-- WBC bloody- 3375- 91% N, no growth. 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 COMPARISON: 7/3/2024.   IMPRESSION: Partial right knee replacement. 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 (  Previously identified. Periprosthetic osteolysis within the medial aspect of the proximal tibia has increased in comparison to the previous study, now measuring 3.3 x 2.6 cm in diameter compared to 2.2 x 1.7 cm. There is no evidence acute fracture identified)    Co-morbidities:    TA on immunosuppression-  treatment X1 year.

## 2024-08-30 NOTE — PROGRESS NOTES
0730  Bedside and Verbal shift change report given to Aggie Miller RN (oncoming nurse) by Shalonda Sabillon RN (offgoing nurse). Report included the following information Nurse Handoff Report, Adult Overview, Surgery Report, Intake/Output, MAR, and Recent Results.

## 2024-08-30 NOTE — PLAN OF CARE
Problem: Pain  Goal: Verbalizes/displays adequate comfort level or baseline comfort level  8/29/2024 2124 by Shalonda Sabillon RN  Outcome: Progressing  8/29/2024 1148 by Marita Bustillo RN  Outcome: Progressing     Problem: Safety - Adult  Goal: Free from fall injury  8/29/2024 2124 by Shalonda Sabillon RN  Outcome: Progressing  8/29/2024 1148 by Marita Bustillo RN  Outcome: Progressing     Problem: Skin/Tissue Integrity  Goal: Absence of new skin breakdown  Description: 1.  Monitor for areas of redness and/or skin breakdown  2.  Assess vascular access sites hourly  3.  Every 4-6 hours minimum:  Change oxygen saturation probe site  4.  Every 4-6 hours:  If on nasal continuous positive airway pressure, respiratory therapy assess nares and determine need for appliance change or resting period.  8/29/2024 2124 by Shalonda Sabillon RN  Outcome: Progressing  8/29/2024 1148 by Marita Bustillo RN  Outcome: Progressing

## 2024-08-30 NOTE — PROGRESS NOTES
Progress Note      Patient: Lanny Elliott               Sex: female          DOA: 8/24/2024       YOB: 1945      Age:  79 y.o.        LOS:  LOS: 6 days     Status Post: Procedure(s):  RIGHT KNEE ARTHROSCOPIC WASHOUT (PT IN ROOM #326)  Surgery Date: 08/27/2024         Subjective:     Lanny Elliott is a 79 y.o. female who c/o improving knee pain. She is able to move a bit better with nursing and physical therapy. Intra-op prelim cultures growing E coli.    Objective:      Visit Vitals  BP (!) 146/55   Pulse 81   Temp 98.9 °F (37.2 °C) (Oral)   Resp 16   Ht 1.651 m (5' 5\")   Wt 57.6 kg (127 lb)   SpO2 94%   BMI 21.13 kg/m²       Physical Exam:   Dressing:  clean, dry, intact   Wiggles Toes/Ankle  Foot sensation intact to light touch  No foot edema/ +1 Posterior Tibial Pulse    Intake and Output:  Current Shift:  08/29 1901 - 08/30 0700  In: 2440.2 [I.V.:2040.2]  Out: 900 [Urine:900]  Last three shifts:  08/28 0701 - 08/29 1900  In: 2370 [P.O.:120; I.V.:1500]  Out: 1800 [Urine:1800]  Voiding Status:  +void without need for danielle catheter    Lab/Data Reviewed:  Recent Labs     08/28/24  0638 08/29/24  0624 08/30/24  0236   HGB 10.4*  --   --    HCT 30.3*  --   --       < > 141   K 3.9   < > 3.7      < > 110   CO2 25   < > 27   BUN 9   < > 9    < > = values in this interval not displayed.       Medications Reviewed    Assessment/Plan     Principal Problem:    Septic joint (HCC)  Active Problems:    Temporal arteritis (HCC)    Hypokalemia    HTN (hypertension)    Constipation  Resolved Problems:    * No resolved hospital problems. *      Continue OOB and WBAT with PT.  Pending final culture sensitivity for antibiotic determination per ID  Plan for OR on Thursday 9/5  Explant of hardware with antibiotic spacer. This was discussed with patient and her daughter on the phone. Extensive time spent in room with patient and all questions answered.  Will continue to follow through the weekend

## 2024-08-30 NOTE — PROGRESS NOTES
Received report from SHABANA Villagomez. Pt AAOx3, NAD, breathing non labored, on room air, HOB up. IV site clean, dry and intact. IVF going per order. Dressing right knee in place. Bed at the lowest level on lock position, call bell w/i reach. Bed alarm on.

## 2024-08-31 LAB
ANION GAP SERPL CALC-SCNC: 8 MMOL/L (ref 3–18)
BUN SERPL-MCNC: 7 MG/DL (ref 7–18)
BUN/CREAT SERPL: 14 (ref 12–20)
CALCIUM SERPL-MCNC: 8.5 MG/DL (ref 8.5–10.1)
CHLORIDE SERPL-SCNC: 111 MMOL/L (ref 100–111)
CO2 SERPL-SCNC: 25 MMOL/L (ref 21–32)
CREAT SERPL-MCNC: 0.51 MG/DL (ref 0.6–1.3)
GLUCOSE SERPL-MCNC: 96 MG/DL (ref 74–99)
POTASSIUM SERPL-SCNC: 3.6 MMOL/L (ref 3.5–5.5)
SODIUM SERPL-SCNC: 144 MMOL/L (ref 136–145)

## 2024-08-31 PROCEDURE — 6370000000 HC RX 637 (ALT 250 FOR IP): Performed by: FAMILY MEDICINE

## 2024-08-31 PROCEDURE — 6370000000 HC RX 637 (ALT 250 FOR IP): Performed by: PHYSICIAN ASSISTANT

## 2024-08-31 PROCEDURE — 97530 THERAPEUTIC ACTIVITIES: CPT

## 2024-08-31 PROCEDURE — 6370000000 HC RX 637 (ALT 250 FOR IP): Performed by: INTERNAL MEDICINE

## 2024-08-31 PROCEDURE — 36415 COLL VENOUS BLD VENIPUNCTURE: CPT

## 2024-08-31 PROCEDURE — 80048 BASIC METABOLIC PNL TOTAL CA: CPT

## 2024-08-31 PROCEDURE — 2580000003 HC RX 258: Performed by: INTERNAL MEDICINE

## 2024-08-31 PROCEDURE — 6360000002 HC RX W HCPCS: Performed by: INTERNAL MEDICINE

## 2024-08-31 PROCEDURE — 97110 THERAPEUTIC EXERCISES: CPT

## 2024-08-31 PROCEDURE — 1100000000 HC RM PRIVATE

## 2024-08-31 PROCEDURE — 6370000000 HC RX 637 (ALT 250 FOR IP): Performed by: ORTHOPAEDIC SURGERY

## 2024-08-31 PROCEDURE — 2580000003 HC RX 258: Performed by: PHYSICIAN ASSISTANT

## 2024-08-31 PROCEDURE — 6360000002 HC RX W HCPCS: Performed by: PHYSICIAN ASSISTANT

## 2024-08-31 PROCEDURE — 6370000000 HC RX 637 (ALT 250 FOR IP): Performed by: HOSPITALIST

## 2024-08-31 RX ORDER — MECOBALAMIN 5000 MCG
5 TABLET,DISINTEGRATING ORAL
Status: DISCONTINUED | OUTPATIENT
Start: 2024-08-31 | End: 2024-09-07 | Stop reason: HOSPADM

## 2024-08-31 RX ADMIN — DOCUSATE SODIUM 100 MG: 100 CAPSULE, LIQUID FILLED ORAL at 20:24

## 2024-08-31 RX ADMIN — Medication 5 MG: at 20:24

## 2024-08-31 RX ADMIN — WATER 1000 MG: 1 INJECTION INTRAMUSCULAR; INTRAVENOUS; SUBCUTANEOUS at 16:24

## 2024-08-31 RX ADMIN — Medication 1 TABLET: at 09:35

## 2024-08-31 RX ADMIN — SODIUM CHLORIDE, PRESERVATIVE FREE 10 ML: 5 INJECTION INTRAVENOUS at 09:40

## 2024-08-31 RX ADMIN — OXYCODONE HYDROCHLORIDE 10 MG: 5 TABLET ORAL at 15:04

## 2024-08-31 RX ADMIN — PANTOPRAZOLE SODIUM 40 MG: 40 TABLET, DELAYED RELEASE ORAL at 06:40

## 2024-08-31 RX ADMIN — MELOXICAM 15 MG: 7.5 TABLET ORAL at 09:35

## 2024-08-31 RX ADMIN — Medication 625 MG: at 09:36

## 2024-08-31 RX ADMIN — MINERAL OIL, PETROLATUM: 425; 568 OINTMENT OPHTHALMIC at 00:14

## 2024-08-31 RX ADMIN — ACETAMINOPHEN 650 MG: 325 TABLET ORAL at 15:04

## 2024-08-31 RX ADMIN — PREDNISONE 4 MG: 1 TABLET ORAL at 09:36

## 2024-08-31 RX ADMIN — OXYCODONE HYDROCHLORIDE 10 MG: 5 TABLET ORAL at 20:23

## 2024-08-31 RX ADMIN — OXYCODONE HYDROCHLORIDE 10 MG: 5 TABLET ORAL at 00:11

## 2024-08-31 RX ADMIN — HYDRALAZINE HYDROCHLORIDE 10 MG: 20 INJECTION INTRAMUSCULAR; INTRAVENOUS at 18:09

## 2024-08-31 RX ADMIN — SODIUM CHLORIDE, PRESERVATIVE FREE 10 ML: 5 INJECTION INTRAVENOUS at 20:29

## 2024-08-31 RX ADMIN — ENOXAPARIN SODIUM 40 MG: 100 INJECTION SUBCUTANEOUS at 09:36

## 2024-08-31 RX ADMIN — DOCUSATE SODIUM 100 MG: 100 CAPSULE, LIQUID FILLED ORAL at 09:35

## 2024-08-31 RX ADMIN — ACETAMINOPHEN 650 MG: 325 TABLET ORAL at 00:10

## 2024-08-31 RX ADMIN — ACETAMINOPHEN 650 MG: 325 TABLET ORAL at 06:39

## 2024-08-31 RX ADMIN — POLYETHYLENE GLYCOL 3350 17 G: 17 POWDER, FOR SOLUTION ORAL at 09:36

## 2024-08-31 RX ADMIN — POLYETHYLENE GLYCOL 3350 17 G: 17 POWDER, FOR SOLUTION ORAL at 20:24

## 2024-08-31 ASSESSMENT — PAIN - FUNCTIONAL ASSESSMENT
PAIN_FUNCTIONAL_ASSESSMENT: ACTIVITIES ARE NOT PREVENTED
PAIN_FUNCTIONAL_ASSESSMENT: PREVENTS OR INTERFERES WITH MANY ACTIVE NOT PASSIVE ACTIVITIES

## 2024-08-31 ASSESSMENT — PAIN SCALES - GENERAL
PAINLEVEL_OUTOF10: 6
PAINLEVEL_OUTOF10: 10
PAINLEVEL_OUTOF10: 10
PAINLEVEL_OUTOF10: 2
PAINLEVEL_OUTOF10: 0
PAINLEVEL_OUTOF10: 5
PAINLEVEL_OUTOF10: 7

## 2024-08-31 ASSESSMENT — PAIN DESCRIPTION - LOCATION
LOCATION: KNEE;FOOT
LOCATION: FOOT
LOCATION: LEG;KNEE;FOOT
LOCATION: KNEE;LEG

## 2024-08-31 ASSESSMENT — PAIN DESCRIPTION - ONSET: ONSET: ON-GOING

## 2024-08-31 ASSESSMENT — PAIN SCALES - WONG BAKER: WONGBAKER_NUMERICALRESPONSE: HURTS A LITTLE BIT

## 2024-08-31 ASSESSMENT — PAIN DESCRIPTION - DESCRIPTORS
DESCRIPTORS: DULL
DESCRIPTORS: ACHING

## 2024-08-31 ASSESSMENT — PAIN DESCRIPTION - ORIENTATION
ORIENTATION: RIGHT

## 2024-08-31 ASSESSMENT — PAIN DESCRIPTION - FREQUENCY: FREQUENCY: INTERMITTENT

## 2024-08-31 ASSESSMENT — PAIN DESCRIPTION - PAIN TYPE: TYPE: SURGICAL PAIN

## 2024-08-31 NOTE — PROGRESS NOTES
1440 Per PT, pt complains of numbness on to right lower extremity.  PT readjusted TORI hose and placed ice on extremity. Upon inspection this RN also noticed that RLE was significantly more swollen than in the AM. ACE wrap around knee was taken off and readjusted to accommodate new swelling. RLE pink and warm with intact pulses. MD Valdez aware.     1624 Pt states feeling off relief in RLE and full sensation. Pulses intact and swelling down significantly.       1648 Pt /88 Manually, pt requested to wait and have BP retaken before administering PRN Hydralazine.    1938 Bedside and Verbal shift change report given to Shalonda Carmen RN (oncoming nurse) by Danita RN (offgoing nurse). Report included the following information Nurse Handoff Report, Adult Overview, Intake/Output, MAR, Surgical Report and Recent Results.

## 2024-08-31 NOTE — PROGRESS NOTES
Physical Therapy Goals:  Initiated 8/31/2024 to be met within 7-10 days.  Short Term Goals  Short Term Goal 1: Patient will perform supine to/from sit with SBA  Short Term Goal 2: Patient will perform sit to/from stand with SB-CGA in preperation for gait progression  Short Term Goal 3: Patient will ambulate 100 ft with RW and SB-CGA to progress OOB mobility  Short Term Goal 4: Patient will negotiate 3 stairs with handrails and CGA to simulate home entry    []  Patient has met MD nichole wu for d/c home   []  Recommend HH with 24 hour adult care   [x]  Benefit from additional acute PT session to address:  ambulation, stairs      PHYSICAL THERAPY TREATMENT    Patient: Lanny Elliott (79 y.o. female)  Date: 8/31/2024  Diagnosis: Septic joint (HCC) [M00.9] Septic joint (HCC)  Procedure(s) (LRB):  RIGHT KNEE ARTHROSCOPIC WASHOUT (PT IN ROOM #326) (Right) 4 Days Post-Op  Precautions: Fall Risk, Weight Bearing, Right Lower Extremity Weight Bearing: Weight Bearing As Tolerated,  ,  ,  ,  ,  ,    PLOF: ambulatory with a cane, has a RW    ASSESSMENT:  Assessment  Assessment: Pt supine in bed upon arrival.  Increased time to sit up EOB and min/modA.  Elevated bed to assist with sit to stand.  1st attempt pt unable to stand all the way up and reach for the walker.  2nd attempt pt able to stand and attempted to take a step but unable to lift either foot of the floor.  REturned to sitting and performed (B)LE ROM strengthening exercises.  Yayo with RLE into supine and assisted pt onto a bed pan.  Voided a large amount, isis-care performed.  Activity Tolerance: Patient limited by pain  Discharge Recommendations:  (TBD s/p surgery next week)    Progression toward goals:   []      Improving appropriately and progressing toward goals  [x]      Improving slowly and progressing toward goals  []      Not making progress toward goals and plan of care will be adjusted     PLAN:  Patient continues to benefit from skilled

## 2024-08-31 NOTE — PROGRESS NOTES
Bedside shift change report given to Collette DONALD (oncoming nurse) by Aggie Miller RN   (offgoing nurse). Report included the following information Nurse Handoff Report, Index, Intake/Output, MAR, and Recent Results.

## 2024-08-31 NOTE — PROGRESS NOTES
Progress Note POD #      Patient: Lanny Elliott               Sex: female          DOA: 8/24/2024         YOB: 1945      Surgery: Procedure(s):  RIGHT KNEE ARTHROSCOPIC WASHOUT (PT IN ROOM #326)           LOS: 7 days               Subjective:     No new complaints    Objective:      Visit Vitals  BP (!) 154/64   Pulse 83   Temp 98.3 °F (36.8 °C) (Oral)   Resp 18   Ht 1.651 m (5' 5\")   Wt 57.6 kg (127 lb)   SpO2 97%   BMI 21.13 kg/m²       Physical Exam:  Neurological: motor strength: 5/5 in lower extremities bilaterally                          sensation: intact to light touch        Intake and Output:  Current Shift:  No intake/output data recorded.  Last three shifts:  08/29 1901 - 08/31 0700  In: 4160.2 [P.O.:220; I.V.:3540.2]  Out: 4500 [Urine:4500]      Lab/Data Reviewed:  Lab Results   Component Value Date/Time    WBC 10.9 08/28/2024 06:38 AM    HGB 10.4 08/28/2024 06:38 AM    HCT 30.3 08/28/2024 06:38 AM     08/28/2024 06:38 AM    MCV 91.5 08/28/2024 06:38 AM     Lab Results   Component Value Date/Time    APTT 24.0 08/26/2024 03:05 AM     Lab Results   Component Value Date/Time    INR 0.9 08/26/2024 03:05 AM    INR 0.9 08/07/2024 04:15 PM      No results for input(s): \"HGB\" in the last 72 hours.        Assessment/Plan     Principal Problem:    Septic joint (HCC)  Active Problems:    Temporal arteritis (HCC)    Hypokalemia    HTN (hypertension)    Constipation  Resolved Problems:    * No resolved hospital problems. *      1. Stable  2. OOB with PT  3. Planning surgery this week

## 2024-08-31 NOTE — PROGRESS NOTES
Hospitalist Progress Note    Patient: Lanny Elliott MRN: [id number]  CSN: [id number]    YOB: 1945  Age: 79 y.o.  Sex: female    DOA: 8/24/2024 LOS:  LOS: 7 days          Chief Complain :septic joint hypokalemia , htn       Subjective:     She complains of pain in limb  Assessment/Plan     Active Hospital Problems    Diagnosis     Constipation [K59.00]     Septic joint (HCC) [M00.9]     Temporal arteritis (HCC) [M31.6]     Hypokalemia [E87.6]     HTN (hypertension) [I10]         Septic joint:    Probable R knee TKA infection? late.   -- DDX with mechanical etiology/deg changes, E.coli- pansensitive  S/P OR 8/27--arthroscopic debridment and washout--gross synovitis, large and displaced lateral meniscal tear with no finding of purulence or gross infection per op note  --BL partial TKA >20 years ago  --Right partial TKA- pain from 03/2023  ID consulted and they recommend cefipime and vanco    Temporal arteritis: on actemra    Hypokalemia: better     HTN:  this is likely from Pain    I discussed atr length with patient  Melatonin prn sleep     Case discussed with:  [x]Patient  []Family  []Nursing  []Case Management  DVT Prophylaxis:  [x]Lovenox  []Hep SQ  []SCDs  []Coumadin   []On Heparin gtt.    Disposition :     Review of systems : As above and,  General: No fevers or chills.  Cardiovascular: No chest pain or pressure. No palpitations.   Pulmonary: No shortness of breath.   Gastrointestinal: No nausea, vomiting.     Physical Exam: As above and,  General: Awake, cooperative, no acute distress    HEENT: NC, Atraumatic.  PERRLA, anicteric sclerae.  Lungs: CTA Bilaterally. No Wheezing/Rhonchi/Rales.  Heart:  S1 S2,  No murmur, No Rubs, No Gallops  Abdomen: Soft, Non distended, Non tender.  +Bowel sounds,   Extremities: No c/c/e  Psych:   Not anxious or agitated.  Neurologic:  No acute neurological deficit.         Vital signs/Intake and Output:  Visit Vitals  BP (!) 148/57   Pulse 77   Temp 98 °F (36.7

## 2024-09-01 LAB
ANION GAP SERPL CALC-SCNC: 6 MMOL/L (ref 3–18)
BACTERIA SPEC CULT: ABNORMAL
BACTERIA SPEC CULT: ABNORMAL
BACTERIA SPEC CULT: NORMAL
BACTERIA SPEC CULT: NORMAL
BUN SERPL-MCNC: 7 MG/DL (ref 7–18)
BUN/CREAT SERPL: 19 (ref 12–20)
CALCIUM SERPL-MCNC: 8.5 MG/DL (ref 8.5–10.1)
CHLORIDE SERPL-SCNC: 110 MMOL/L (ref 100–111)
CO2 SERPL-SCNC: 24 MMOL/L (ref 21–32)
CREAT SERPL-MCNC: 0.36 MG/DL (ref 0.6–1.3)
GLUCOSE SERPL-MCNC: 103 MG/DL (ref 74–99)
GRAM STN SPEC: ABNORMAL
GRAM STN SPEC: ABNORMAL
POTASSIUM SERPL-SCNC: 3.3 MMOL/L (ref 3.5–5.5)
SERVICE CMNT-IMP: ABNORMAL
SERVICE CMNT-IMP: NORMAL
SERVICE CMNT-IMP: NORMAL
SODIUM SERPL-SCNC: 140 MMOL/L (ref 136–145)

## 2024-09-01 PROCEDURE — 1100000000 HC RM PRIVATE

## 2024-09-01 PROCEDURE — 6360000002 HC RX W HCPCS: Performed by: INTERNAL MEDICINE

## 2024-09-01 PROCEDURE — 36415 COLL VENOUS BLD VENIPUNCTURE: CPT

## 2024-09-01 PROCEDURE — 6370000000 HC RX 637 (ALT 250 FOR IP): Performed by: INTERNAL MEDICINE

## 2024-09-01 PROCEDURE — 6370000000 HC RX 637 (ALT 250 FOR IP): Performed by: HOSPITALIST

## 2024-09-01 PROCEDURE — 2580000003 HC RX 258: Performed by: PHYSICIAN ASSISTANT

## 2024-09-01 PROCEDURE — 6370000000 HC RX 637 (ALT 250 FOR IP): Performed by: ORTHOPAEDIC SURGERY

## 2024-09-01 PROCEDURE — 6370000000 HC RX 637 (ALT 250 FOR IP): Performed by: PHYSICIAN ASSISTANT

## 2024-09-01 PROCEDURE — 6360000002 HC RX W HCPCS: Performed by: PHYSICIAN ASSISTANT

## 2024-09-01 PROCEDURE — 2580000003 HC RX 258: Performed by: INTERNAL MEDICINE

## 2024-09-01 PROCEDURE — 80048 BASIC METABOLIC PNL TOTAL CA: CPT

## 2024-09-01 RX ADMIN — PREDNISONE 4 MG: 1 TABLET ORAL at 09:22

## 2024-09-01 RX ADMIN — SODIUM CHLORIDE, PRESERVATIVE FREE 10 ML: 5 INJECTION INTRAVENOUS at 20:06

## 2024-09-01 RX ADMIN — DOCUSATE SODIUM 100 MG: 100 CAPSULE, LIQUID FILLED ORAL at 09:22

## 2024-09-01 RX ADMIN — ACETAMINOPHEN 650 MG: 325 TABLET ORAL at 05:58

## 2024-09-01 RX ADMIN — ACETAMINOPHEN 650 MG: 325 TABLET ORAL at 13:59

## 2024-09-01 RX ADMIN — HYDRALAZINE HYDROCHLORIDE 10 MG: 20 INJECTION INTRAMUSCULAR; INTRAVENOUS at 16:14

## 2024-09-01 RX ADMIN — Medication 5 MG: at 20:09

## 2024-09-01 RX ADMIN — OXYCODONE HYDROCHLORIDE 10 MG: 5 TABLET ORAL at 20:08

## 2024-09-01 RX ADMIN — ACETAMINOPHEN 650 MG: 325 TABLET ORAL at 18:25

## 2024-09-01 RX ADMIN — MELOXICAM 15 MG: 7.5 TABLET ORAL at 09:22

## 2024-09-01 RX ADMIN — WATER 1000 MG: 1 INJECTION INTRAMUSCULAR; INTRAVENOUS; SUBCUTANEOUS at 16:15

## 2024-09-01 RX ADMIN — Medication 1 TABLET: at 09:22

## 2024-09-01 RX ADMIN — SODIUM CHLORIDE, PRESERVATIVE FREE 10 ML: 5 INJECTION INTRAVENOUS at 09:25

## 2024-09-01 RX ADMIN — Medication 625 MG: at 09:22

## 2024-09-01 RX ADMIN — ENOXAPARIN SODIUM 40 MG: 100 INJECTION SUBCUTANEOUS at 09:24

## 2024-09-01 RX ADMIN — PANTOPRAZOLE SODIUM 40 MG: 40 TABLET, DELAYED RELEASE ORAL at 05:59

## 2024-09-01 RX ADMIN — OXYCODONE HYDROCHLORIDE 10 MG: 5 TABLET ORAL at 16:13

## 2024-09-01 RX ADMIN — OXYCODONE HYDROCHLORIDE 10 MG: 5 TABLET ORAL at 06:26

## 2024-09-01 ASSESSMENT — PAIN DESCRIPTION - DESCRIPTORS
DESCRIPTORS: ACHING
DESCRIPTORS: ACHING
DESCRIPTORS: SHARP
DESCRIPTORS: ACHING;SHARP
DESCRIPTORS: SHOOTING
DESCRIPTORS: ACHING;SHARP

## 2024-09-01 ASSESSMENT — PAIN SCALES - GENERAL
PAINLEVEL_OUTOF10: 5
PAINLEVEL_OUTOF10: 6
PAINLEVEL_OUTOF10: 5
PAINLEVEL_OUTOF10: 0
PAINLEVEL_OUTOF10: 10
PAINLEVEL_OUTOF10: 8
PAINLEVEL_OUTOF10: 7
PAINLEVEL_OUTOF10: 6
PAINLEVEL_OUTOF10: 10
PAINLEVEL_OUTOF10: 8

## 2024-09-01 ASSESSMENT — PAIN - FUNCTIONAL ASSESSMENT
PAIN_FUNCTIONAL_ASSESSMENT: ACTIVITIES ARE NOT PREVENTED

## 2024-09-01 ASSESSMENT — PAIN DESCRIPTION - LOCATION
LOCATION: KNEE;ANKLE
LOCATION: KNEE;ANKLE
LOCATION: KNEE
LOCATION: KNEE;ANKLE

## 2024-09-01 ASSESSMENT — PAIN DESCRIPTION - ORIENTATION
ORIENTATION: RIGHT

## 2024-09-01 ASSESSMENT — PAIN SCALES - WONG BAKER
WONGBAKER_NUMERICALRESPONSE: HURTS A LITTLE BIT
WONGBAKER_NUMERICALRESPONSE: HURTS A LITTLE BIT

## 2024-09-01 NOTE — PROGRESS NOTES
Progress Note POD #      Patient: Lanny Elliott               Sex: female          DOA: 8/24/2024         YOB: 1945      Surgery: Procedure(s):  RIGHT KNEE ARTHROSCOPIC WASHOUT (PT IN ROOM #326)           LOS: 8 days               Subjective:     No new complaints    Objective:      Visit Vitals  BP (!) 170/83   Pulse 87   Temp 98.2 °F (36.8 °C) (Oral)   Resp 18   Ht 1.651 m (5' 5\")   Wt 57.6 kg (127 lb)   SpO2 95%   BMI 21.13 kg/m²       Physical Exam:  Neurological: motor strength: 5/5 in lower extremities bilaterally                          sensation: intact to light touch   Left LE with normal DP, minimal swelling      Intake and Output:  Current Shift:  No intake/output data recorded.  Last three shifts:  08/30 1901 - 09/01 0700  In: 5654.6 [P.O.:1480; I.V.:4174.6]  Out: 5400 [Urine:5400]      Lab/Data Reviewed:  Lab Results   Component Value Date/Time    WBC 10.9 08/28/2024 06:38 AM    HGB 10.4 08/28/2024 06:38 AM    HCT 30.3 08/28/2024 06:38 AM     08/28/2024 06:38 AM    MCV 91.5 08/28/2024 06:38 AM     Lab Results   Component Value Date/Time    APTT 24.0 08/26/2024 03:05 AM     Lab Results   Component Value Date/Time    INR 0.9 08/26/2024 03:05 AM    INR 0.9 08/07/2024 04:15 PM      No results for input(s): \"HGB\" in the last 72 hours.        Assessment/Plan     Principal Problem:    Septic joint (HCC)  Active Problems:    Temporal arteritis (HCC)    Hypokalemia    HTN (hypertension)    Constipation  Resolved Problems:    * No resolved hospital problems. *      1. Stable  2. OOB with PT  3. Surgery scheduled for Thursday

## 2024-09-01 NOTE — PROGRESS NOTES
0730  Bedside and Verbal shift change report given to Danita White RN (oncoming nurse) by Shalonda Sabillon RN (offgoing nurse). Report included the following information Nurse Handoff Report, Adult Overview, Surgery Report, Intake/Output, MAR, and Recent Results.

## 2024-09-01 NOTE — PROGRESS NOTES
2000  Patient in bed awake, A/O x4, speech clear, hearing intact, patient can weight bare on right leg as tolerated but has only gotten up with PT, continent of urine and stool. Patient's last bowel movement was 8/31. Patient has new pure wick in place set to wall suction. On room air, lung sounds clear, respirations unlabored. IV dressings clean, dry, and intact. Dressing on knee include steri strips, abd, and then ace wrapped. Dressing on right knee is currently clean, dry, and intact. Patient has robert hose and SCDs in place. Radial and pedal pulses present bilaterally, capillary refill <3 seconds to fingers and toes. Abdomen soft, bowel sounds active. Moderate hand  noted to bilateral upper extremities. Side rails x3 up, bed locked and in lowest position, bed alarm on, call bell and bedside table within reach, needs attended, denies any SOB, or concerns at present. Patient denied scheduled Tylenol and requested for her Roxicodone due to her current pain of 7/10 and states it helps her more. Patient also spoke with doctor for something to help her sleep. MD administered Melatonin to help patient with rest.       0600  Patient bed bath performed, new linen, new gown, teeth brushed, hair brushed, purewick replaced.

## 2024-09-01 NOTE — PLAN OF CARE
Problem: Pain  Goal: Verbalizes/displays adequate comfort level or baseline comfort level  9/1/2024 1127 by Danita White RN  Outcome: Progressing  8/31/2024 2201 by Shalonda Sabillon RN  Outcome: Progressing     Problem: Safety - Adult  Goal: Free from fall injury  9/1/2024 1127 by Danita White RN  Outcome: Progressing  8/31/2024 2201 by Shalonda Sabillon RN  Outcome: Progressing     Problem: Skin/Tissue Integrity  Goal: Absence of new skin breakdown  Description: 1.  Monitor for areas of redness and/or skin breakdown  2.  Assess vascular access sites hourly  3.  Every 4-6 hours minimum:  Change oxygen saturation probe site  4.  Every 4-6 hours:  If on nasal continuous positive airway pressure, respiratory therapy assess nares and determine need for appliance change or resting period.  9/1/2024 1127 by Danita White RN  Outcome: Progressing  8/31/2024 2201 by Shalonda Sabillon RN  Outcome: Progressing

## 2024-09-01 NOTE — PROGRESS NOTES
Hospitalist Progress Note    Patient: Lanny Elliott MRN: [id number]  CSN: [id number]    YOB: 1945  Age: 79 y.o.  Sex: female    DOA: 8/24/2024 LOS:  LOS: 8 days          Chief Complain :septic joint hypokalemia , htn       Subjective:     She complains of pain and swelling in RLE.          Review of systems : As above and,  General: No fevers or chills.  Cardiovascular: No chest pain or pressure. No palpitations.   Pulmonary: No shortness of breath.   Gastrointestinal: No nausea, vomiting.     Physical Exam: As above and,  General: Awake, cooperative, no acute distress    HEENT: NC, Atraumatic.  PERRLA, anicteric sclerae.  Lungs: CTA Bilaterally. No Wheezing/Rhonchi/Rales.  Heart:  S1 S2,  No murmur, No Rubs, No Gallops  Abdomen: Soft, Non distended, Non tender.  +Bowel sounds,   Extremities: No c/c/e  Psych:   Not anxious or agitated.  Neurologic:  No acute neurological deficit.         Vital signs/Intake and Output:  Visit Vitals  /87   Pulse 96   Temp 98.2 °F (36.8 °C) (Oral)   Resp 18   Ht 1.651 m (5' 5\")   Wt 57.6 kg (127 lb)   SpO2 96%   BMI 21.13 kg/m²     Current Shift:  09/01 0701 - 09/01 1900  In: 420 [P.O.:420]  Out: 1200 [Urine:1200]  Last three shifts:  08/30 1901 - 09/01 0700  In: 5654.6 [P.O.:1480; I.V.:4174.6]  Out: 5400 [Urine:5400]            Labs: Results:       Chemistry Recent Labs     08/30/24  0236 08/31/24  0651 09/01/24  0314    144 140   K 3.7 3.6 3.3*    111 110   CO2 27 25 24   BUN 9 7 7   ,No results found for: \"LACTA\"   CBC w/Diff No results for input(s): \"WBC\", \"RBC\", \"HGB\", \"HCT\", \"PLT\" in the last 72 hours.    Invalid input(s): \"GRANS\", \"LYMPH\", \"EOS\"   Cardiac Enzymes No results found for: \"CKTOTAL\", \"CKMB\", \"CKMBINDEX\", \"TROPONINI\", \"TROPHS\",No results found for: \"BNP\"   Coagulation No results for input(s): \"INR\", \"APTT\" in the last 72 hours.    Invalid input(s): \"PTP\"    Lipid Panel No results found for: \"CHOL\", \"CHOLPOCT\", \"CHLST\", \"CHOLV\",

## 2024-09-01 NOTE — PLAN OF CARE
Problem: Pain  Goal: Verbalizes/displays adequate comfort level or baseline comfort level  8/31/2024 2201 by Shalonda Sabillon RN  Outcome: Progressing  8/31/2024 0921 by Collette River RN  Outcome: Progressing     Problem: Safety - Adult  Goal: Free from fall injury  8/31/2024 2201 by Shalonda Sabillon, RN  Outcome: Progressing  8/31/2024 0921 by Collette River, RN  Outcome: Progressing     Problem: Skin/Tissue Integrity  Goal: Absence of new skin breakdown  Description: 1.  Monitor for areas of redness and/or skin breakdown  2.  Assess vascular access sites hourly  3.  Every 4-6 hours minimum:  Change oxygen saturation probe site  4.  Every 4-6 hours:  If on nasal continuous positive airway pressure, respiratory therapy assess nares and determine need for appliance change or resting period.  8/31/2024 2201 by Shalonda Sabillon RN  Outcome: Progressing  8/31/2024 0921 by Collette River, RN  Outcome: Progressing

## 2024-09-01 NOTE — PROGRESS NOTES
Bedside shift change report given to Shalonda RN (oncoming nurse) by Sammy Murdock RN RN (offgoing nurse). Report included the following information Nurse Handoff Report, Adult Overview, Intake/Output, MAR, and Recent Results.

## 2024-09-01 NOTE — PROGRESS NOTES
On 1800 patient contacted nurse stated that they were experiencing shortness of breath and that the room was very stuffy. Nurse assessed and performed vitals.  Was able to assess that patient had clear lung sounds and had 99 oxygen perfusion. Was able to cool down room and provide ice water to comfort patient. Patient denied chest pain and was able to speak and no longer felt shortness of breath after the room cooled down and had a cup of ice water. Nurse provided tylenol for clients pain in the right knee. Patient was relieved of distress and anxiety after performed interventions.

## 2024-09-01 NOTE — PROGRESS NOTES
1528  Bedside and Verbal shift change report given to Collette RN (oncoming nurse) by Danita RN (offgoing nurse). Report included the following information Nurse Handoff Report, Adult Overview, Surgery Report, Intake/Output, MAR, and Recent Results.

## 2024-09-02 LAB
ALBUMIN SERPL-MCNC: 3.5 G/DL (ref 3.4–5)
ALBUMIN/GLOB SERPL: 1.4 (ref 0.8–1.7)
ALP SERPL-CCNC: 68 U/L (ref 45–117)
ALT SERPL-CCNC: 168 U/L (ref 13–56)
ANION GAP SERPL CALC-SCNC: 9 MMOL/L (ref 3–18)
AST SERPL-CCNC: 90 U/L (ref 10–38)
BACTERIA SPEC CULT: NORMAL
BACTERIA SPEC CULT: NORMAL
BILIRUB DIRECT SERPL-MCNC: 0.2 MG/DL (ref 0–0.2)
BILIRUB SERPL-MCNC: 0.5 MG/DL (ref 0.2–1)
BUN SERPL-MCNC: 6 MG/DL (ref 7–18)
BUN/CREAT SERPL: 12 (ref 12–20)
CALCIUM SERPL-MCNC: 9.4 MG/DL (ref 8.5–10.1)
CHLORIDE SERPL-SCNC: 106 MMOL/L (ref 100–111)
CO2 SERPL-SCNC: 25 MMOL/L (ref 21–32)
CREAT SERPL-MCNC: 0.49 MG/DL (ref 0.6–1.3)
GLOBULIN SER CALC-MCNC: 2.5 G/DL (ref 2–4)
GLUCOSE SERPL-MCNC: 129 MG/DL (ref 74–99)
MAGNESIUM SERPL-MCNC: 2 MG/DL (ref 1.6–2.6)
POTASSIUM SERPL-SCNC: 3.5 MMOL/L (ref 3.5–5.5)
PROT SERPL-MCNC: 6 G/DL (ref 6.4–8.2)
SERVICE CMNT-IMP: NORMAL
SERVICE CMNT-IMP: NORMAL
SODIUM SERPL-SCNC: 140 MMOL/L (ref 136–145)

## 2024-09-02 PROCEDURE — 6360000002 HC RX W HCPCS: Performed by: INTERNAL MEDICINE

## 2024-09-02 PROCEDURE — 6360000002 HC RX W HCPCS: Performed by: PHYSICIAN ASSISTANT

## 2024-09-02 PROCEDURE — 80076 HEPATIC FUNCTION PANEL: CPT

## 2024-09-02 PROCEDURE — 6370000000 HC RX 637 (ALT 250 FOR IP): Performed by: PHYSICIAN ASSISTANT

## 2024-09-02 PROCEDURE — 1100000000 HC RM PRIVATE

## 2024-09-02 PROCEDURE — 83735 ASSAY OF MAGNESIUM: CPT

## 2024-09-02 PROCEDURE — 6370000000 HC RX 637 (ALT 250 FOR IP): Performed by: ORTHOPAEDIC SURGERY

## 2024-09-02 PROCEDURE — 6370000000 HC RX 637 (ALT 250 FOR IP): Performed by: HOSPITALIST

## 2024-09-02 PROCEDURE — 97530 THERAPEUTIC ACTIVITIES: CPT

## 2024-09-02 PROCEDURE — 36415 COLL VENOUS BLD VENIPUNCTURE: CPT

## 2024-09-02 PROCEDURE — 80048 BASIC METABOLIC PNL TOTAL CA: CPT

## 2024-09-02 PROCEDURE — 2580000003 HC RX 258: Performed by: INTERNAL MEDICINE

## 2024-09-02 PROCEDURE — 2580000003 HC RX 258: Performed by: PHYSICIAN ASSISTANT

## 2024-09-02 PROCEDURE — 6370000000 HC RX 637 (ALT 250 FOR IP): Performed by: INTERNAL MEDICINE

## 2024-09-02 RX ORDER — POTASSIUM CHLORIDE 1500 MG/1
40 TABLET, EXTENDED RELEASE ORAL ONCE
Status: COMPLETED | OUTPATIENT
Start: 2024-09-02 | End: 2024-09-02

## 2024-09-02 RX ORDER — AMLODIPINE BESYLATE 5 MG/1
5 TABLET ORAL DAILY
Status: DISCONTINUED | OUTPATIENT
Start: 2024-09-02 | End: 2024-09-07 | Stop reason: HOSPADM

## 2024-09-02 RX ORDER — HYDROCHLOROTHIAZIDE 25 MG/1
12.5 TABLET ORAL DAILY
Status: DISCONTINUED | OUTPATIENT
Start: 2024-09-02 | End: 2024-09-07 | Stop reason: HOSPADM

## 2024-09-02 RX ORDER — LISINOPRIL 20 MG/1
20 TABLET ORAL DAILY
Status: DISCONTINUED | OUTPATIENT
Start: 2024-09-02 | End: 2024-09-03

## 2024-09-02 RX ADMIN — SODIUM CHLORIDE, PRESERVATIVE FREE 10 ML: 5 INJECTION INTRAVENOUS at 08:28

## 2024-09-02 RX ADMIN — AMLODIPINE BESYLATE 5 MG: 5 TABLET ORAL at 09:20

## 2024-09-02 RX ADMIN — PREDNISONE 4 MG: 1 TABLET ORAL at 08:25

## 2024-09-02 RX ADMIN — OXYCODONE HYDROCHLORIDE 10 MG: 5 TABLET ORAL at 18:49

## 2024-09-02 RX ADMIN — MELOXICAM 15 MG: 7.5 TABLET ORAL at 08:25

## 2024-09-02 RX ADMIN — Medication 625 MG: at 08:24

## 2024-09-02 RX ADMIN — ACETAMINOPHEN 650 MG: 325 TABLET ORAL at 05:50

## 2024-09-02 RX ADMIN — ENOXAPARIN SODIUM 40 MG: 100 INJECTION SUBCUTANEOUS at 08:25

## 2024-09-02 RX ADMIN — LISINOPRIL 20 MG: 20 TABLET ORAL at 13:47

## 2024-09-02 RX ADMIN — Medication 1 TABLET: at 08:24

## 2024-09-02 RX ADMIN — OXYCODONE HYDROCHLORIDE 10 MG: 5 TABLET ORAL at 23:44

## 2024-09-02 RX ADMIN — OXYCODONE HYDROCHLORIDE 10 MG: 5 TABLET ORAL at 13:40

## 2024-09-02 RX ADMIN — WATER 1000 MG: 1 INJECTION INTRAMUSCULAR; INTRAVENOUS; SUBCUTANEOUS at 17:52

## 2024-09-02 RX ADMIN — HYDROCHLOROTHIAZIDE 12.5 MG: 25 TABLET ORAL at 13:47

## 2024-09-02 RX ADMIN — DOCUSATE SODIUM 100 MG: 100 CAPSULE, LIQUID FILLED ORAL at 08:25

## 2024-09-02 RX ADMIN — HYDRALAZINE HYDROCHLORIDE 10 MG: 20 INJECTION INTRAMUSCULAR; INTRAVENOUS at 08:36

## 2024-09-02 RX ADMIN — SODIUM CHLORIDE, PRESERVATIVE FREE 10 ML: 5 INJECTION INTRAVENOUS at 19:57

## 2024-09-02 RX ADMIN — ACETAMINOPHEN 1000 MG: 500 TABLET ORAL at 12:00

## 2024-09-02 RX ADMIN — POTASSIUM CHLORIDE 40 MEQ: 1500 TABLET, EXTENDED RELEASE ORAL at 12:00

## 2024-09-02 RX ADMIN — Medication 5 MG: at 19:55

## 2024-09-02 RX ADMIN — PANTOPRAZOLE SODIUM 40 MG: 40 TABLET, DELAYED RELEASE ORAL at 05:51

## 2024-09-02 ASSESSMENT — PAIN SCALES - GENERAL
PAINLEVEL_OUTOF10: 10
PAINLEVEL_OUTOF10: 6
PAINLEVEL_OUTOF10: 10
PAINLEVEL_OUTOF10: 8
PAINLEVEL_OUTOF10: 6
PAINLEVEL_OUTOF10: 0
PAINLEVEL_OUTOF10: 4

## 2024-09-02 ASSESSMENT — PAIN DESCRIPTION - LOCATION
LOCATION: ANKLE;KNEE;FOOT
LOCATION: ANKLE;KNEE;HEAD
LOCATION: HEAD
LOCATION: ANKLE;FOOT
LOCATION: KNEE

## 2024-09-02 ASSESSMENT — PAIN - FUNCTIONAL ASSESSMENT
PAIN_FUNCTIONAL_ASSESSMENT: PREVENTS OR INTERFERES SOME ACTIVE ACTIVITIES AND ADLS
PAIN_FUNCTIONAL_ASSESSMENT: ACTIVITIES ARE NOT PREVENTED
PAIN_FUNCTIONAL_ASSESSMENT: ACTIVITIES ARE NOT PREVENTED

## 2024-09-02 ASSESSMENT — PAIN DESCRIPTION - DESCRIPTORS
DESCRIPTORS: DISCOMFORT;SQUEEZING
DESCRIPTORS: STABBING
DESCRIPTORS: ACHING

## 2024-09-02 ASSESSMENT — PAIN DESCRIPTION - ORIENTATION
ORIENTATION: RIGHT
ORIENTATION: RIGHT;ANTERIOR

## 2024-09-02 NOTE — PROGRESS NOTES
Shift:  No intake/output data recorded.  Last three shifts:  08/31 1901 - 09/02 0700  In: 3094.6 [P.O.:420; I.V.:2674.6]  Out: 5600 [Urine:5600]        Physical Exam:  General: WD, WN.  Alert, cooperative, no acute distress    HEENT: NC, Atraumatic.  PERRLA, anicteric sclerae.  Lungs: CTA Bilaterally. No Wheezing/Rhonchi/Rales.  Heart:  Regular  rhythm,  No murmur, No Rubs, No Gallops  Abdomen: Soft, Non distended, Non tender.  +Bowel sounds,   Extremities: No c/c, rt knee wrapped with ace bandage    Psych:   Not anxious or agitated.  Neurologic:  No acute neurological deficit.             Labs: Results:       Chemistry Recent Labs     08/31/24  0651 09/01/24  0314    140   K 3.6 3.3*    110   CO2 25 24   BUN 7 7      CBC w/Diff No results for input(s): \"WBC\", \"RBC\", \"HGB\", \"HCT\", \"PLT\" in the last 72 hours.    Invalid input(s): \"GRANS\", \"LYMPH\", \"EOS\"     Cardiac Enzymes No results for input(s): \"CPK\" in the last 72 hours.    Invalid input(s): \"CKRMB\", \"CKND1\", \"TROIP\", \"CHAVO\"   Coagulation No results for input(s): \"INR\", \"APTT\" in the last 72 hours.    Invalid input(s): \"PTP\"      Lipid Panel No results found for: \"CHOL\", \"CHOLPOCT\", \"CHLST\", \"CHOLV\", \"910667\", \"HDL\", \"HDLC\", \"LDL\", \"VLDLC\", \"VLDL\"   BNP Invalid input(s): \"BNPP\"   Liver Enzymes No results for input(s): \"TP\" in the last 72 hours.    Invalid input(s): \"ALB\", \"TBIL\", \"AP\", \"SGOT\", \"GPT\", \"DBIL\"   Thyroid Studies Lab Results   Component Value Date/Time    TSH 4.60 08/25/2024 11:53 PM        Procedures/imaging: see electronic medical records for all procedures/Xrays and details which were not copied into this note but were reviewed prior to creation of Plan    US DUPLEX LOWER EXTREMITY VEINS RIGHT    Result Date: 8/24/2024  CLINICAL HISTORY: Right knee and calf swelling x3 months. COMPARISON: Right knee CT 8/24/2024. FINDINGS: Grayscale and color and spectral Doppler imaging performed of right lower extremity. Flow and compressibility with

## 2024-09-02 NOTE — PROGRESS NOTES
0800  Patient refused colace, miralax, and Fibercon due to frequent bowel movements. Patient states she only takes her Fibercon once daily in the morning. Patient also states she did not want her dose of tylenol at 0130 in the morning.

## 2024-09-02 NOTE — PROGRESS NOTES
2000  Patient in bed awake, A/O x4, speech clear, hearing intact, patient can weight bare on right leg as tolerated but has only gotten up with PT, continent of urine and stool. Patient's last bowel movement was 8/31. Patient has new pure wick in place set to wall suction. On room air, lung sounds clear, respirations unlabored. IV dressings clean, dry, and intact. Dressing on knee include steri strips, abd, and then ace wrapped. Dressing on right knee is currently clean, dry, and intact. Patient has robert hose and SCDs in place. Radial and pedal pulses present bilaterally, capillary refill <3 seconds to fingers and toes. Abdomen soft, bowel sounds active. Moderate hand  noted to bilateral upper extremities. Side rails x3 up, bed locked and in lowest position, bed alarm on, call bell and bedside table within reach, needs attended, denies any SOB, or concerns at present.     0600  Patient bed bath performed, teeth brushed, hair care, face washed and new purewick, linen, and gown.

## 2024-09-02 NOTE — PLAN OF CARE
Problem: Pain  Goal: Verbalizes/displays adequate comfort level or baseline comfort level  9/1/2024 2244 by Shalonda Sabillon RN  Outcome: Progressing  9/1/2024 1127 by Danita White RN  Outcome: Progressing     Problem: Safety - Adult  Goal: Free from fall injury  9/1/2024 2244 by Shalonda Sabillon RN  Outcome: Progressing  9/1/2024 1127 by Danita White RN  Outcome: Progressing     Problem: Skin/Tissue Integrity  Goal: Absence of new skin breakdown  Description: 1.  Monitor for areas of redness and/or skin breakdown  2.  Assess vascular access sites hourly  3.  Every 4-6 hours minimum:  Change oxygen saturation probe site  4.  Every 4-6 hours:  If on nasal continuous positive airway pressure, respiratory therapy assess nares and determine need for appliance change or resting period.  9/1/2024 2244 by Shalonda Sabillon RN  Outcome: Progressing  9/1/2024 1127 by Danita White RN  Outcome: Progressing

## 2024-09-02 NOTE — PROGRESS NOTES
Progress Note POD #      Patient: Lanny Elliott               Sex: female          DOA: 8/24/2024         YOB: 1945      Surgery: Procedure(s):  RIGHT KNEE ARTHROSCOPIC WASHOUT (PT IN ROOM #326)           LOS: 9 days               Subjective:     No new complaints    Objective:      Visit Vitals  BP (!) 180/75   Pulse 87   Temp 98 °F (36.7 °C) (Oral)   Resp 17   Ht 1.651 m (5' 5\")   Wt 57.6 kg (127 lb)   SpO2 97%   BMI 21.13 kg/m²       Physical Exam:  Neurological: motor strength: 5/5 in lower extremities bilaterally                          sensation: intact to light touch  No calf swelling or tenderness    Intake and Output:  Current Shift:  No intake/output data recorded.  Last three shifts:  08/31 1901 - 09/02 0700  In: 3094.6 [P.O.:420; I.V.:2674.6]  Out: 5600 [Urine:5600]      Lab/Data Reviewed:  Lab Results   Component Value Date/Time    WBC 10.9 08/28/2024 06:38 AM    HGB 10.4 08/28/2024 06:38 AM    HCT 30.3 08/28/2024 06:38 AM     08/28/2024 06:38 AM    MCV 91.5 08/28/2024 06:38 AM     Lab Results   Component Value Date/Time    APTT 24.0 08/26/2024 03:05 AM     Lab Results   Component Value Date/Time    INR 0.9 08/26/2024 03:05 AM    INR 0.9 08/07/2024 04:15 PM      No results for input(s): \"HGB\" in the last 72 hours.        Assessment/Plan     Principal Problem:    Septic joint (HCC)  Active Problems:    Temporal arteritis (HCC)    Hypokalemia    HTN (hypertension)    Constipation  Resolved Problems:    * No resolved hospital problems. *      1. Stable  2. OOB with PT  3. Planning for surgery Thursday

## 2024-09-02 NOTE — PROGRESS NOTES
Physical Therapy Goals:  Initiated 9/2/2024 to be met within 7-10 days.  Short Term Goals  Short Term Goal 1: Patient will perform supine to/from sit with SBA  Short Term Goal 2: Patient will perform sit to/from stand with SB-CGA in preperation for gait progression  Short Term Goal 3: Patient will ambulate 100 ft with RW and SB-CGA to progress OOB mobility  Short Term Goal 4: Patient will negotiate 3 stairs with handrails and CGA to simulate home entry  PHYSICAL THERAPY TREATMENT    Patient: Lanny Elliott (79 y.o. female)  Date: 9/2/2024  Diagnosis: Septic joint (HCC) [M00.9] Septic joint (HCC)  Procedure(s) (LRB):  RIGHT KNEE ARTHROSCOPIC WASHOUT (PT IN ROOM #326) (Right) 6 Days Post-Op  Precautions: Fall Risk, Weight Bearing, Right Lower Extremity Weight Bearing: Weight Bearing As Tolerated    ASSESSMENT:  Patient mobility limited by severe headache today. Transitioned to EOB with decreased assistance of CGA. After several minutes of sitting pt requiring return to supine. BP has been elevated today, 172/68 during session. Patient continues to keep R LE in hip external rotation and hip/knee flexion. Continue to educate and reposition in proper alignment. Ice pacs applied at end of session. Will continue to follow patient while in hospital and progress mobility as tolerated.    Progression toward goals:   []      Improving appropriately and progressing toward goals  [x]      Improving slowly and progressing toward goals  []      Not making progress toward goals and plan of care will be adjusted     PLAN:  Patient continues to benefit from skilled intervention to address the above impairments.  Continue treatment per established plan of care.    Further Equipment Recommendations for Discharge: manual wheelchair    AMPAC:   AM-PAC Inpatient Mobility without Stair Climbing Raw Score : 12    This AMPAC score should be considered in conjunction with interdisciplinary team recommendations to determine the most  intervention: Nurse notified    Activity Tolerance:   Activity Tolerance: Patient limited by pain    After treatment:   [] Patient left in no apparent distress sitting up in chair  [x] Patient left in no apparent distress in bed  [x] Call bell left within reach  [x] Nursing notified  [] Caregiver present  [] Bed alarm activated  [] SCDs applied      COMMUNICATION/EDUCATION:   Patient Education  Education Given To: Patient  Education Provided: Role of Therapy;Plan of Care;Home Exercise Program;Transfer Training;Equipment;Precautions  Education Method: Verbal  Barriers to Learning: None  Education Outcome: Verbalized understanding;Continued education needed      Minutes: 23  Sowmya Lazaro, PT

## 2024-09-02 NOTE — PROGRESS NOTES
Occupational Therapy Goals:  Initiated 9/2/2024 to be met within 7-10 days.  Short Term Goals  Time Frame for Short Term Goals: 7 days  Short Term Goal 1: Patient will complete toileting with supervision.  Short Term Goal 2: Patient will complete LBD with mod I/I and AE PRN.  Short Term Goal 3: Patient will complete grooming in standing at sink for 3 minutes with supervision.  Short Term Goal 4: Patient will complete sponge bathing with supervision.      OCCUPATIONAL THERAPY TREATMENT    Patient: Lanny Elliott (79 y.o. female)  Date: 9/2/2024  Diagnosis: Septic joint (HCC) [M00.9] Septic joint (HCC)  Procedure(s) (LRB):  RIGHT KNEE ARTHROSCOPIC WASHOUT (PT IN ROOM #326) (Right) 6 Days Post-Op  Precautions:  Fall Risk, Weight Bearing, Right Lower Extremity Weight Bearing: Weight Bearing As Tolerated,    Chart, occupational therapy assessment, plan of care, and goals were reviewed.  ASSESSMENT:  Upon arrival pt was semi-reclined in bed with compression stockings donned, SCD to LLE, and pt reporting 10/10 headache. Pt agreeable to therapy session. Pt seen with PT for second set of skilled hands. BP prior to movement was 172/68. Pt completed supine to sit with SBA. Pt remains seated at EOB for approx. 3 minutes stating increased nausea/dizziness. When therapists begin to instruct pt in sit to stand transfer pt reports that she needs to lay back down. Pt completes sit to supine with SBA. During adjustment of bed pad, therapist pt's need for brief change. Pt demonstrates ability to roll side to side with supervision. Pt requires max A for isis care and brief change. Pt is noted to be able to assist with anterior hygiene. Pt completes scooting to HOB with supervision. Pt left semi-reclined in bed with RLE elevated and positioned with further support d/t pt's tendency to external rotate.     Pt presents with decreased endurance and increased pain in RLE impacting pt's ability to complete ADLs and ADL related transfers  sitting up in chair  [x]  Patient left in no apparent distress in bed  [x]  Call bell left within reach  [x]  Nursing notified  []  Caregiver present  []  Bed alarm activated    COMMUNICATION/EDUCATION:   Patient Education  Education Given To: Patient  Education Provided: Role of Therapy;Transfer Training;Plan of Care;Fall Prevention Strategies;Mobility Training  Education Method: Verbal  Barriers to Learning: None  Education Outcome: Verbalized understanding      Thank you for this referral.  Penelope Sellers OT  Minutes: 20    [facility] AM-PAC® Daily Activity Inpatient Short Form (6-Clicks)*    How much HELP from another person does the patient currently need…    (If the patient hasn't done an activity recently, how much help from another person do you think he/she would need if he/she tried?)   Total (Total A or Dep)   A Lot  (Mod to Max A)   A Little (Sup or Min A)   None (Mod I to I)   Putting on and taking off regular lower body clothing?   [] 1 [x] 2 [] 3 [] 4   2. Bathing (including washing, rinsing,      drying)?    [] 1 [x] 2 [] 3 [] 4   3. Toileting, which includes using toilet, bedpan or urinal?   [] 1 [x] 2 [] 3 [] 4   4. Putting on and taking off regular upper body clothing?   [] 1 [] 2 [x] 3 [] 4   5. Taking care of personal grooming such as brushing teeth?   [] 1 [] 2 [x] 3 [] 4   6. Eating meals?   [] 1 [] 2 [] 3 [x] 4       Current research shows that an AM-PAC score of 18 or greater is associated with a discharge to the patient's home setting. Based on an AM-PAC score of 16/24 and their current ADL deficits; it is recommended that the patient have 2-3 sessions per week of Occupational Therapy at d/c to increase the patient's independence.

## 2024-09-02 NOTE — PROGRESS NOTES
working in [employer]. Travel to [employer] in past 2-3 years.         Past Medical History:   Diagnosis Date    Acid reflux disease     Arthritis     Temporal arteritis (HCC) 2024    Venous insufficiency     noted in CE     Past Surgical History:   Procedure Laterality Date    BLADDER REPAIR  2017    lift    HYSTERECTOMY VAGINAL  1986    partial    JOINT REPLACEMENT Right 2004    index and middle finger    KNEE ARTHROSCOPY Right 8/27/2024    RIGHT KNEE ARTHROSCOPIC WASHOUT (PT IN ROOM #326) performed by David Jarrett DO at [facility] MAIN OR    PARTIAL KNEE ARTHROPLASTY Bilateral     ROTATOR CUFF REPAIR Bilateral      History reviewed. No pertinent family history.      Medications:  Current Facility-Administered Medications   Medication Dose Route Frequency Provider Last Rate Last Admin    amLODIPine (NORVASC) tablet 5 mg  5 mg Oral Daily Aye Jerry MD   5 mg at 09/02/24 0920    melatonin disintegrating tablet 5 mg  5 mg Oral QHS PRN Flores Valdez MD   5 mg at 09/01/24 2009    cefTRIAXone (ROCEPHIN) 1,000 mg in sterile water 10 mL IV syringe  1,000 mg IntraVENous Q24H Bri Diana MD   1,000 mg at 09/01/24 1615    lubrifresh P.M. (artificial tears) ophthalmic ointment   Both Eyes PRN Jane Pham MD   Given at 08/31/24 0014    acetaminophen (TYLENOL) tablet 1,000 mg  1,000 mg Oral Once Harish Watson MD        meloxicam (MOBIC) tablet 15 mg  15 mg Oral Daily Cami Brown PA-C   15 mg at 09/02/24 0825    tranexamic acid-NaCl IVPB premix 1,000 mg  1,000 mg IntraVENous See Admin Instructions David Jarrett DO        docusate sodium (COLACE) capsule 100 mg  100 mg Oral BID Aye Jerry MD   100 mg at 09/02/24 0825    polyethylene glycol (GLYCOLAX) packet 17 g  17 g Oral BID Aye Jerry MD   17 g at 08/31/24 2024    sodium chloride flush 0.9 % injection 5-40 mL  5-40 mL IntraVENous 2 times per day Cami Brown PA-C   10 mL at 09/02/24 0828    sodium chloride flush 0.9 % injection 5-40  CAD/MI  Musculoskeletal:  myalgias arthralgias, joint pain/ swelling-right,  back pain  Psych: Depression or anxiety       Objective:       BP (!) 160/70 Comment: Post Hydralazine  Pulse 87   Temp 98 °F (36.7 °C) (Oral)   Resp 17   Ht 1.651 m (5' 5\")   Wt 57.6 kg (127 lb)   SpO2 97%   BMI 21.13 kg/m²   Temp (24hrs), Av.6 °F (37 °C), Min:98 °F (36.7 °C), Max:99 °F (37.2 °C)      Lines: PIV    General:   WD WN , awake alert and oriented fully, on RA   Skin:   Erythema on cheeks  No peripheral IE finding on skin exam/ extremities  Right knee is dressed   HEENT:  Normocephalic, atraumatic, EOMI       Lungs:   non-labored. CTA                          CVS RRR   Abdomen:  soft, non-distended   Genitourinary:  deferred   Extremities:   no clubbing, cyanosis   Neurologic:  No gross focal sensory abnormalities;  Cranial nerves intact   Psychiatric:   appropriate and interactive.        Labs: Results:   Chemistry Recent Labs     24  0651 24  0314    140   K 3.6 3.3*    110   CO2 25 24   BUN 7 7      CBC w/Diff No results for input(s): \"WBC\", \"RBC\", \"HGB\", \"HCT\", \"PLT\" in the last 72 hours.    Invalid input(s): \"GRANS\", \"LYMPH\", \"EOS\"           No results found for: \"SDES\" No components found for: \"CULT\"         Imaging:   All imaging reviewed from Admission to present as per radiology interpretation in [facility]    Radiology report last 24 hours:    US DUPLEX LOWER EXTREMITY VEINS RIGHT    Result Date: 2024  CLINICAL HISTORY: Right knee and calf swelling x3 months. COMPARISON: Right knee CT 2024. FINDINGS: Grayscale and color and spectral Doppler imaging performed of right lower extremity. Flow and compressibility with spontaneous and phasic venous waveforms seen in right common femoral, superficial femoral, and popliteal veins. Flow demonstrated in right external iliac, greater saphenous, profunda femoral, gastrocnemius, posterior tibial, and peroneal veins and left external

## 2024-09-02 NOTE — PROGRESS NOTES
0822 Continuous LR discontinued after speaking with MD Andersen.     0836 PRN Hydralazine administered after Manual BP of 190/80. Will recheck in 30 minutes.     0911 Manual BP Post-Hydralazine 160/70    1200 Pt continues to complain of 8/10 headache getting worse. PRN 1,000mg Tylenol given in stead of scheduled 650mg dose.     1220 Manual BP of 188/80 taken on opposite arm of previous arm which yielded 175/78. Pt complains of headache and face is flushed pink, but no complaints of dizziness or nausea. Pt expressed concern over her high blood pressure stating \"I've never had high blood pressure at home\". MD Jerry notified via [employer].     1915 Bedside and Verbal shift change report given to Santino DONALD (oncoming nurse) by Danita DONALD (offgoing nurse). Report included the following information Nurse Handoff Report, Adult Overview, Intake/Output, MAR, and Recent Results.

## 2024-09-02 NOTE — PROGRESS NOTES
0725  Bedside and Verbal shift change report given to Danita White RN (oncoming nurse) by Shalonda Sabillon RN (offgoing nurse). Report included the following information Nurse Handoff Report, Adult Overview, Surgery Report, Intake/Output, MAR, and Recent Results.

## 2024-09-03 LAB
BASOPHILS # BLD: 0.2 K/UL (ref 0–0.1)
BASOPHILS NFR BLD: 2 % (ref 0–2)
DIFFERENTIAL METHOD BLD: ABNORMAL
EOSINOPHIL # BLD: 0.6 K/UL (ref 0–0.4)
EOSINOPHIL NFR BLD: 6 % (ref 0–5)
ERYTHROCYTE [DISTWIDTH] IN BLOOD BY AUTOMATED COUNT: 15.9 % (ref 11.6–14.5)
HCT VFR BLD AUTO: 32.5 % (ref 35–45)
HGB BLD-MCNC: 10.7 G/DL (ref 12–16)
IMM GRANULOCYTES # BLD AUTO: 0.7 K/UL (ref 0–0.04)
IMM GRANULOCYTES NFR BLD AUTO: 7 % (ref 0–0.5)
LYMPHOCYTES # BLD: 1.1 K/UL (ref 0.9–3.6)
LYMPHOCYTES NFR BLD: 11 % (ref 21–52)
MAGNESIUM SERPL-MCNC: 1.9 MG/DL (ref 1.6–2.6)
MCH RBC QN AUTO: 30.4 PG (ref 24–34)
MCHC RBC AUTO-ENTMCNC: 32.9 G/DL (ref 31–37)
MCV RBC AUTO: 92.3 FL (ref 78–100)
MONOCYTES # BLD: 1.1 K/UL (ref 0.05–1.2)
MONOCYTES NFR BLD: 12 % (ref 3–10)
NEUTS SEG # BLD: 6.2 K/UL (ref 1.8–8)
NEUTS SEG NFR BLD: 63 % (ref 40–73)
NRBC # BLD: 0 K/UL (ref 0–0.01)
NRBC BLD-RTO: 0 PER 100 WBC
PLATELET # BLD AUTO: 305 K/UL (ref 135–420)
PMV BLD AUTO: 9.5 FL (ref 9.2–11.8)
RBC # BLD AUTO: 3.52 M/UL (ref 4.2–5.3)
WBC # BLD AUTO: 9.9 K/UL (ref 4.6–13.2)

## 2024-09-03 PROCEDURE — 6370000000 HC RX 637 (ALT 250 FOR IP): Performed by: ORTHOPAEDIC SURGERY

## 2024-09-03 PROCEDURE — 83735 ASSAY OF MAGNESIUM: CPT

## 2024-09-03 PROCEDURE — 2580000003 HC RX 258: Performed by: PHYSICIAN ASSISTANT

## 2024-09-03 PROCEDURE — 6370000000 HC RX 637 (ALT 250 FOR IP): Performed by: INTERNAL MEDICINE

## 2024-09-03 PROCEDURE — 6370000000 HC RX 637 (ALT 250 FOR IP): Performed by: HOSPITALIST

## 2024-09-03 PROCEDURE — 36415 COLL VENOUS BLD VENIPUNCTURE: CPT

## 2024-09-03 PROCEDURE — 97530 THERAPEUTIC ACTIVITIES: CPT

## 2024-09-03 PROCEDURE — 6360000002 HC RX W HCPCS: Performed by: PHYSICIAN ASSISTANT

## 2024-09-03 PROCEDURE — 85025 COMPLETE CBC W/AUTO DIFF WBC: CPT

## 2024-09-03 PROCEDURE — 1100000000 HC RM PRIVATE

## 2024-09-03 PROCEDURE — 2580000003 HC RX 258: Performed by: INTERNAL MEDICINE

## 2024-09-03 PROCEDURE — 6370000000 HC RX 637 (ALT 250 FOR IP): Performed by: PHYSICIAN ASSISTANT

## 2024-09-03 PROCEDURE — 6360000002 HC RX W HCPCS: Performed by: INTERNAL MEDICINE

## 2024-09-03 RX ORDER — LISINOPRIL 20 MG/1
40 TABLET ORAL DAILY
Status: DISCONTINUED | OUTPATIENT
Start: 2024-09-04 | End: 2024-09-07 | Stop reason: HOSPADM

## 2024-09-03 RX ADMIN — AMLODIPINE BESYLATE 5 MG: 5 TABLET ORAL at 08:39

## 2024-09-03 RX ADMIN — Medication 625 MG: at 08:55

## 2024-09-03 RX ADMIN — ACETAMINOPHEN 650 MG: 325 TABLET ORAL at 13:20

## 2024-09-03 RX ADMIN — HYDROCHLOROTHIAZIDE 12.5 MG: 25 TABLET ORAL at 08:42

## 2024-09-03 RX ADMIN — ACETAMINOPHEN 650 MG: 325 TABLET ORAL at 19:46

## 2024-09-03 RX ADMIN — Medication 625 MG: at 19:47

## 2024-09-03 RX ADMIN — Medication 5 MG: at 19:49

## 2024-09-03 RX ADMIN — POLYETHYLENE GLYCOL 3350 17 G: 17 POWDER, FOR SOLUTION ORAL at 08:41

## 2024-09-03 RX ADMIN — LISINOPRIL 20 MG: 20 TABLET ORAL at 08:41

## 2024-09-03 RX ADMIN — OXYCODONE HYDROCHLORIDE 10 MG: 5 TABLET ORAL at 09:51

## 2024-09-03 RX ADMIN — ENOXAPARIN SODIUM 40 MG: 100 INJECTION SUBCUTANEOUS at 08:42

## 2024-09-03 RX ADMIN — WATER 1000 MG: 1 INJECTION INTRAMUSCULAR; INTRAVENOUS; SUBCUTANEOUS at 16:07

## 2024-09-03 RX ADMIN — SODIUM CHLORIDE, PRESERVATIVE FREE 10 ML: 5 INJECTION INTRAVENOUS at 19:49

## 2024-09-03 RX ADMIN — PREDNISONE 4 MG: 1 TABLET ORAL at 08:55

## 2024-09-03 RX ADMIN — SODIUM CHLORIDE, PRESERVATIVE FREE 10 ML: 5 INJECTION INTRAVENOUS at 08:46

## 2024-09-03 RX ADMIN — DOCUSATE SODIUM 100 MG: 100 CAPSULE, LIQUID FILLED ORAL at 08:38

## 2024-09-03 RX ADMIN — PANTOPRAZOLE SODIUM 40 MG: 40 TABLET, DELAYED RELEASE ORAL at 06:49

## 2024-09-03 RX ADMIN — MELOXICAM 15 MG: 7.5 TABLET ORAL at 08:40

## 2024-09-03 RX ADMIN — DOCUSATE SODIUM 100 MG: 100 CAPSULE, LIQUID FILLED ORAL at 19:47

## 2024-09-03 RX ADMIN — OXYCODONE HYDROCHLORIDE 10 MG: 5 TABLET ORAL at 06:50

## 2024-09-03 RX ADMIN — POLYETHYLENE GLYCOL 3350 17 G: 17 POWDER, FOR SOLUTION ORAL at 19:47

## 2024-09-03 RX ADMIN — OXYCODONE HYDROCHLORIDE 10 MG: 5 TABLET ORAL at 16:05

## 2024-09-03 RX ADMIN — Medication 1 TABLET: at 08:40

## 2024-09-03 ASSESSMENT — PAIN DESCRIPTION - LOCATION
LOCATION: KNEE
LOCATION: KNEE
LOCATION: FOOT;ANKLE
LOCATION: KNEE;LEG
LOCATION: KNEE

## 2024-09-03 ASSESSMENT — PAIN SCALES - GENERAL
PAINLEVEL_OUTOF10: 0
PAINLEVEL_OUTOF10: 9
PAINLEVEL_OUTOF10: 10
PAINLEVEL_OUTOF10: 7
PAINLEVEL_OUTOF10: 10
PAINLEVEL_OUTOF10: 2
PAINLEVEL_OUTOF10: 0
PAINLEVEL_OUTOF10: 1
PAINLEVEL_OUTOF10: 3
PAINLEVEL_OUTOF10: 7

## 2024-09-03 ASSESSMENT — PAIN DESCRIPTION - ORIENTATION
ORIENTATION: RIGHT

## 2024-09-03 ASSESSMENT — PAIN DESCRIPTION - DESCRIPTORS
DESCRIPTORS: SHARP
DESCRIPTORS: SHARP

## 2024-09-03 ASSESSMENT — PAIN SCALES - WONG BAKER
WONGBAKER_NUMERICALRESPONSE: HURTS EVEN MORE
WONGBAKER_NUMERICALRESPONSE: NO HURT

## 2024-09-03 ASSESSMENT — PAIN - FUNCTIONAL ASSESSMENT: PAIN_FUNCTIONAL_ASSESSMENT: ACTIVITIES ARE NOT PREVENTED

## 2024-09-03 NOTE — PROGRESS NOTES
Moderate Risk Nutrition Assessment Follow-Up    Type and Reason for Visit: Reassess    Nutrition Recommendations/Plan:   Cont diet as ab w/at least 75% PO intakes to meet needs thank you for doc in I&Os  Previously pt reported does not want Ensure Plus   Cont to replete K as needed and consider checking Phos  Cont MVI w/min daily   Consider checking vit D25 hydroxy level and suppl if low  Cont bowel regimen as needed to prevent constipation however would hold if with diarrhea  Please weigh pt no new wts since admit  Monitor skin integrity      Malnutrition Assessment:  Malnutrition Status: At risk for malnutrition (Comment) (does not meet malnutrition criteria at this time but may be at risk given wt trends down since March and adv age) however pt does not appear with overt signs of malnutrition on observation     Nutrition Assessment:  80yo F with septic joint hypokalemia, HTN, knee effusion s/p washout 8/27, planning replacement Thurs.  eating well good PO intakes since admit.  no new wts since admit wt.  improving constipation w/bowel meds and prune juice noted.    Estimated Daily Nutrient Needs:  Energy (kcal):  1600kcal/day Weight Used for Energy Requirements: Current     Protein (g):  65g pro/day          Fluid (ml/day):  1600ml fluid/day Method Used for Fluid Requirements: 1 ml/kcal    Nutrition Related Findings:     Wound Type: None    Current Nutrition Therapies:    ADULT DIET; Regular    Anthropometric Measures:  Height: 165.1 cm (5' 5\")  Current Body Wt: 57.6 kg (126 lb 15.8 oz)   BMI: 21.1 appears underweight for age however pt does not appear with overt signs of malnutrition on observation     Nutrition Diagnosis:   Unintended weight loss related to catabolic illness as evidenced by  (unintentional 5% wt loss x 5 months not significant but down)    Nutrition Interventions:   Food and/or Nutrient Delivery: Continue Current Diet, Vitamin Supplement, Start Oral Nutrition Supplement  Nutrition  Education/Counseling: No recommendation at this time  Coordination of Nutrition Care: Continue to monitor while inpatient    Goals:  Goals: Meet at least 75% of estimated needs (prevent wt loss and skin breakdown)    Nutrition Monitoring and Evaluation:   Behavioral-Environmental Outcomes: None Identified  Food/Nutrient Intake Outcomes: Food and Nutrient Intake, Supplement Intake, Vitamin/Mineral Intake  Physical Signs/Symptoms Outcomes: Weight    Discharge Planning:    Too soon to determine     Mable Hernandez MS, RD  Clinical Dietitian  E: [email or web address]  O:  [phone]  C:  [phone]

## 2024-09-03 NOTE — PROGRESS NOTES
Occupational Therapy Treatment Attempt    Chart reviewed. Attempted Occupational Therapy Treatment, however, patient unable to be seen due to:  []  Nausea/vomiting  []  Eating  []  Pain  []  Patient too lethargic  []  Off Unit for testing/procedure  []  Dialysis treatment in progress  []  Telemetry Results  [x]  Other: Patient on phone at this time. 1454    Will f/u later as patient's schedule allows.   Thank you for this referral.  Cris Veloz, OTJED, OTR/L      *Checked back at 1620 and patient declining therapy d/t pain in R knee.*

## 2024-09-03 NOTE — PROGRESS NOTES
Patient endorses pain in ankle and anterior portion of foot. Swelling noted in that area as well.  Not warm/hot to touch. Elevated, Robert hose removed per pt request. Ice applied.     Attempted to adjust patient's leg to avoid external rotating, But too painful to move for patient.  Robert hose being removed improved pain.     Swelling in anterior portion of foot decreased after robert hose were off.    0630 Chg bath and linen change. Patient able to sit in 90 degree angle. Tolerated well. Declined tylenol and stool softeners.Ate. Leg placed on pillow.

## 2024-09-03 NOTE — PROGRESS NOTES
Physical Therapy Goals:  Initiated 9/3/2024 to be met within 7-10 days.  Short Term Goals  Short Term Goal 1: Patient will perform supine to/from sit with SBA  Short Term Goal 2: Patient will perform sit to/from stand with SB-CGA in preperation for gait progression  Short Term Goal 3: Patient will ambulate 100 ft with RW and SB-CGA to progress OOB mobility  Short Term Goal 4: Patient will negotiate 3 stairs with handrails and CGA to simulate home entry    []  Patient has met MD nichole wu for d/c home   []  Recommend HH with 24 hour adult care   [x]  Benefit from additional acute PT session(s): ambulation, stairs      PHYSICAL THERAPY TREATMENT    Patient: Lanny Elliott (79 y.o. female)  Date: 9/3/2024  Diagnosis: Septic joint (HCC) [M00.9] Septic joint (HCC)  Procedure(s) (LRB):  RIGHT KNEE ARTHROSCOPIC WASHOUT (PT IN ROOM #326) (Right) 7 Days Post-Op  Precautions: Fall Risk, Weight Bearing, Right Lower Extremity Weight Bearing: Weight Bearing As Tolerated,  ,  ,  ,  ,  ,    PLOF: ambulatory    ASSESSMENT:  Assessment  Assessment: Pt in bed upon arrival.  Increased time to sit up EOB.  Yayo and increased time for sit to stand.  Ambulated 1ft with RW and min/modA, step to gt pattern, unable to ext end R knee due to baker cyst pain, R knee buckling in wt bearing.  Returned to sitting EOB and performed (B)LE ROM strengthening exercises.  Left sitting up EOB per pt request.  Activity Tolerance: Patient limited by pain    Progression toward goals:   []      Improving appropriately and progressing toward goals  [x]      Improving slowly and progressing toward goals  []      Not making progress toward goals and plan of care will be adjusted     PLAN:  Patient continues to benefit from skilled intervention to address the above impairments.  Continue treatment per established plan of care.    Further Equipment Recommendations for Discharge: ZAMZAM    AMPAC: 11/24    This AMPAC score should be considered in  conjunction with interdisciplinary team recommendations to determine the most appropriate discharge setting. Patient's social support, diagnosis, medical stability, and prior level of function should also be taken into consideration.     SUBJECTIVE:   Patient stated Subjective: My foot is stil numb.    OBJECTIVE DATA SUMMARY:   Critical Behavior:     WNL      Functional Mobility Training:  Bed Mobility:  Bed Mobility Training  Bed Mobility Training: Yes  Supine to Sit: Stand-by assistance;Additional time  Scooting: Additional time  Transfers:  Transfer Training  Transfer Training: Yes  Sit to Stand: Minimum assistance;Additional time  Stand to Sit: Contact-guard assistance;Additional time  Balance:  Balance  Sitting: Intact  Standing: Impaired;With support  Standing - Static: Fair (-)  Standing - Dynamic: Poor   Ambulation/Gait Training:    Gait Training  Right Side Weight Bearing: As tolerated  Gait  Gait Training: Yes  Right Side Weight Bearing: As tolerated  Overall Level of Assistance: Contact-guard assistance  Distance (ft): 1 Feet  Assistive Device: Gait belt;Walker, rolling  Speed/Marie: Slow;Delayed  Gait Abnormalities: Antalgic;Decreased step clearance;Step to gait  Therapeutic Exercises:   (B)LEs    EXERCISE   Sets   Reps   Active Active Assist   Passive Self ROM   Comments   Ankle Pumps    [] [] [] []    Quad Sets/Glut Sets    [] [] [] [] Hold for 5 secs   Hamstring Sets   [] [] [] []    Short Arc Quads   [] [] [] []    Heel Slides   [] [] [] []    Straight Leg Raises   [] [] [] []    Hip Add  10 [x] [] [] [] Hold for 5 secs, w/ pillow squeeze   Long Arc Quads  10 [x] [] [] []    Seated Marching  10 [x] [] [] []    Standing Marching   [] [] [] []       [] [] [] []        Pain:  Pain level pre-treatment: 0/10  Pain level post-treatment: 0/10   Pain Intervention(s): Rest, Ice, Repositioning   Response to intervention: Nurse notified    Activity Tolerance:   Activity Tolerance: Patient limited by

## 2024-09-03 NOTE — CARE COORDINATION
SW met with pt at bedside who is requesting to be sent to  [facility] to [facility]. SW Sent referral and pt stated if they can't accommodate her she would husam to go to [facility]. Per the pt the [facility] facility is closer to her home.    SW sent referral and will follow up.   Pt scheduled for surgery with Dr. Jarrett on Thursday.     Dispo Plan: [facility] vs [facility]    RASHID David  Case Management Department

## 2024-09-03 NOTE — PROGRESS NOTES
Progress Note      Patient: Lanny Elliott               Sex: female          DOA: 8/24/2024       YOB: 1945      Age:  79 y.o.        LOS:  LOS: 10 days             Subjective:     Lanny Elliott is a 79 y.o. female who c/o continued pain to right knee and top of right foot.     Objective:      Visit Vitals  BP (!) 151/57   Pulse 86   Temp 98.2 °F (36.8 °C) (Oral)   Resp 18   Ht 1.651 m (5' 5\")   Wt 57.6 kg (127 lb)   SpO2 97%   BMI 21.13 kg/m²       Physical Exam:   Dressing:  clean, dry, intact   Wiggles Toes/Ankle  Foot sensation intact to light touch  No foot edema/ +1 Posterior Tibial Pulse    Intake and Output:  Current Shift:  No intake/output data recorded.  Last three shifts:  09/01 1901 - 09/03 0700  In: -   Out: 5400 [Urine:5400]  Voiding Status:  +void without need for danielle catheter    Lab/Data Reviewed:  Recent Labs     09/02/24  0930 09/03/24  0513   HGB  --  10.7*   HCT  --  32.5*     --    K 3.5  --      --    CO2 25  --    BUN 6*  --        Medications Reviewed    Assessment/Plan     Principal Problem:    Septic joint (HCC)  Active Problems:    Temporal arteritis (HCC)    Hypokalemia    HTN (hypertension)    Constipation  Resolved Problems:    * No resolved hospital problems. *      Continue OOB and WBAT with PT  Continue antibiotics per Infectious Disease  Plan for surgery with Dr. Jarrett on Thursdayy      The patient's plan of care was discussed with Dr. Jarrett today as well and he is in agreement with above.

## 2024-09-04 LAB
ALBUMIN SERPL-MCNC: 3.3 G/DL (ref 3.4–5)
ALBUMIN/GLOB SERPL: 1.3 (ref 0.8–1.7)
ALP SERPL-CCNC: 60 U/L (ref 45–117)
ALT SERPL-CCNC: 104 U/L (ref 13–56)
ANION GAP SERPL CALC-SCNC: 9 MMOL/L (ref 3–18)
AST SERPL-CCNC: 41 U/L (ref 10–38)
BASOPHILS # BLD: 0.2 K/UL (ref 0–0.1)
BASOPHILS NFR BLD: 2 % (ref 0–2)
BILIRUB DIRECT SERPL-MCNC: 0.2 MG/DL (ref 0–0.2)
BILIRUB SERPL-MCNC: 0.5 MG/DL (ref 0.2–1)
BUN SERPL-MCNC: 8 MG/DL (ref 7–18)
BUN/CREAT SERPL: 15 (ref 12–20)
CALCIUM SERPL-MCNC: 9.9 MG/DL (ref 8.5–10.1)
CHLORIDE SERPL-SCNC: 104 MMOL/L (ref 100–111)
CO2 SERPL-SCNC: 27 MMOL/L (ref 21–32)
CREAT SERPL-MCNC: 0.52 MG/DL (ref 0.6–1.3)
DIFFERENTIAL METHOD BLD: ABNORMAL
EOSINOPHIL # BLD: 0.6 K/UL (ref 0–0.4)
EOSINOPHIL NFR BLD: 5 % (ref 0–5)
ERYTHROCYTE [DISTWIDTH] IN BLOOD BY AUTOMATED COUNT: 15.9 % (ref 11.6–14.5)
GLOBULIN SER CALC-MCNC: 2.6 G/DL (ref 2–4)
GLUCOSE SERPL-MCNC: 120 MG/DL (ref 74–99)
HCT VFR BLD AUTO: 32.9 % (ref 35–45)
HGB BLD-MCNC: 11.1 G/DL (ref 12–16)
IMM GRANULOCYTES # BLD AUTO: 0.6 K/UL (ref 0–0.04)
IMM GRANULOCYTES NFR BLD AUTO: 6 % (ref 0–0.5)
LYMPHOCYTES # BLD: 1.1 K/UL (ref 0.9–3.6)
LYMPHOCYTES NFR BLD: 11 % (ref 21–52)
Lab: NORMAL
Lab: NORMAL
MAGNESIUM SERPL-MCNC: 2 MG/DL (ref 1.6–2.6)
MCH RBC QN AUTO: 31.3 PG (ref 24–34)
MCHC RBC AUTO-ENTMCNC: 33.7 G/DL (ref 31–37)
MCV RBC AUTO: 92.7 FL (ref 78–100)
MONOCYTES # BLD: 1.1 K/UL (ref 0.05–1.2)
MONOCYTES NFR BLD: 10 % (ref 3–10)
NEUTS SEG # BLD: 7 K/UL (ref 1.8–8)
NEUTS SEG NFR BLD: 66 % (ref 40–73)
NRBC # BLD: 0.02 K/UL (ref 0–0.01)
NRBC BLD-RTO: 0.2 PER 100 WBC
PLATELET # BLD AUTO: 336 K/UL (ref 135–420)
PMV BLD AUTO: 9.4 FL (ref 9.2–11.8)
POTASSIUM SERPL-SCNC: 4.5 MMOL/L (ref 3.5–5.5)
PROT SERPL-MCNC: 5.9 G/DL (ref 6.4–8.2)
RBC # BLD AUTO: 3.55 M/UL (ref 4.2–5.3)
REFERENCE LAB: NORMAL
SODIUM SERPL-SCNC: 140 MMOL/L (ref 136–145)
WBC # BLD AUTO: 10.5 K/UL (ref 4.6–13.2)

## 2024-09-04 PROCEDURE — 85025 COMPLETE CBC W/AUTO DIFF WBC: CPT

## 2024-09-04 PROCEDURE — 80076 HEPATIC FUNCTION PANEL: CPT

## 2024-09-04 PROCEDURE — 6370000000 HC RX 637 (ALT 250 FOR IP): Performed by: INTERNAL MEDICINE

## 2024-09-04 PROCEDURE — 80048 BASIC METABOLIC PNL TOTAL CA: CPT

## 2024-09-04 PROCEDURE — 6370000000 HC RX 637 (ALT 250 FOR IP): Performed by: HOSPITALIST

## 2024-09-04 PROCEDURE — 1100000000 HC RM PRIVATE

## 2024-09-04 PROCEDURE — 36415 COLL VENOUS BLD VENIPUNCTURE: CPT

## 2024-09-04 PROCEDURE — 2580000003 HC RX 258: Performed by: INTERNAL MEDICINE

## 2024-09-04 PROCEDURE — 6370000000 HC RX 637 (ALT 250 FOR IP): Performed by: PHYSICIAN ASSISTANT

## 2024-09-04 PROCEDURE — 6360000002 HC RX W HCPCS: Performed by: INTERNAL MEDICINE

## 2024-09-04 PROCEDURE — 2580000003 HC RX 258: Performed by: PHYSICIAN ASSISTANT

## 2024-09-04 PROCEDURE — 6360000002 HC RX W HCPCS: Performed by: PHYSICIAN ASSISTANT

## 2024-09-04 PROCEDURE — 6370000000 HC RX 637 (ALT 250 FOR IP): Performed by: ORTHOPAEDIC SURGERY

## 2024-09-04 PROCEDURE — 83735 ASSAY OF MAGNESIUM: CPT

## 2024-09-04 RX ADMIN — LISINOPRIL 40 MG: 20 TABLET ORAL at 10:17

## 2024-09-04 RX ADMIN — WATER 1000 MG: 1 INJECTION INTRAMUSCULAR; INTRAVENOUS; SUBCUTANEOUS at 17:25

## 2024-09-04 RX ADMIN — Medication 1 TABLET: at 10:17

## 2024-09-04 RX ADMIN — OXYCODONE HYDROCHLORIDE 10 MG: 5 TABLET ORAL at 20:58

## 2024-09-04 RX ADMIN — HYDROCHLOROTHIAZIDE 12.5 MG: 25 TABLET ORAL at 10:17

## 2024-09-04 RX ADMIN — SODIUM CHLORIDE, PRESERVATIVE FREE 10 ML: 5 INJECTION INTRAVENOUS at 10:22

## 2024-09-04 RX ADMIN — POLYETHYLENE GLYCOL 3350 17 G: 17 POWDER, FOR SOLUTION ORAL at 10:18

## 2024-09-04 RX ADMIN — ACETAMINOPHEN 650 MG: 325 TABLET ORAL at 14:35

## 2024-09-04 RX ADMIN — MELOXICAM 15 MG: 7.5 TABLET ORAL at 10:17

## 2024-09-04 RX ADMIN — AMLODIPINE BESYLATE 5 MG: 5 TABLET ORAL at 10:17

## 2024-09-04 RX ADMIN — OXYCODONE HYDROCHLORIDE 10 MG: 5 TABLET ORAL at 06:08

## 2024-09-04 RX ADMIN — Medication 5 MG: at 20:58

## 2024-09-04 RX ADMIN — PANTOPRAZOLE SODIUM 40 MG: 40 TABLET, DELAYED RELEASE ORAL at 06:08

## 2024-09-04 RX ADMIN — DOCUSATE SODIUM 100 MG: 100 CAPSULE, LIQUID FILLED ORAL at 10:16

## 2024-09-04 RX ADMIN — ENOXAPARIN SODIUM 40 MG: 100 INJECTION SUBCUTANEOUS at 10:17

## 2024-09-04 RX ADMIN — OXYCODONE HYDROCHLORIDE 10 MG: 5 TABLET ORAL at 16:46

## 2024-09-04 RX ADMIN — Medication 625 MG: at 19:41

## 2024-09-04 RX ADMIN — POLYETHYLENE GLYCOL 3350 17 G: 17 POWDER, FOR SOLUTION ORAL at 19:40

## 2024-09-04 RX ADMIN — DOCUSATE SODIUM 100 MG: 100 CAPSULE, LIQUID FILLED ORAL at 19:41

## 2024-09-04 RX ADMIN — Medication 625 MG: at 10:33

## 2024-09-04 RX ADMIN — PREDNISONE 4 MG: 1 TABLET ORAL at 10:16

## 2024-09-04 RX ADMIN — SODIUM CHLORIDE, PRESERVATIVE FREE 10 ML: 5 INJECTION INTRAVENOUS at 19:42

## 2024-09-04 RX ADMIN — ACETAMINOPHEN 650 MG: 325 TABLET ORAL at 06:09

## 2024-09-04 RX ADMIN — OXYCODONE HYDROCHLORIDE 10 MG: 5 TABLET ORAL at 10:16

## 2024-09-04 ASSESSMENT — PAIN SCALES - GENERAL
PAINLEVEL_OUTOF10: 10
PAINLEVEL_OUTOF10: 0
PAINLEVEL_OUTOF10: 4
PAINLEVEL_OUTOF10: 4
PAINLEVEL_OUTOF10: 8
PAINLEVEL_OUTOF10: 0
PAINLEVEL_OUTOF10: 7
PAINLEVEL_OUTOF10: 4
PAINLEVEL_OUTOF10: 8
PAINLEVEL_OUTOF10: 10

## 2024-09-04 ASSESSMENT — PAIN DESCRIPTION - LOCATION
LOCATION: ANKLE;KNEE
LOCATION: ANKLE;KNEE
LOCATION: LEG
LOCATION: ANKLE;KNEE
LOCATION: FOOT;ANKLE
LOCATION: FOOT;KNEE
LOCATION: KNEE

## 2024-09-04 ASSESSMENT — PAIN DESCRIPTION - DESCRIPTORS
DESCRIPTORS: ACHING;THROBBING
DESCRIPTORS: ACHING;SHARP

## 2024-09-04 ASSESSMENT — PAIN - FUNCTIONAL ASSESSMENT
PAIN_FUNCTIONAL_ASSESSMENT: ACTIVITIES ARE NOT PREVENTED
PAIN_FUNCTIONAL_ASSESSMENT: ACTIVITIES ARE NOT PREVENTED
PAIN_FUNCTIONAL_ASSESSMENT: PREVENTS OR INTERFERES SOME ACTIVE ACTIVITIES AND ADLS

## 2024-09-04 ASSESSMENT — PAIN DESCRIPTION - ORIENTATION
ORIENTATION: RIGHT

## 2024-09-04 NOTE — CARE COORDINATION
Pt accepted at [facility] per Mable [phone] in admissions. ADI informed Mable that SHIRA is Friday. Mable reported if pt is stable over the weekend she can admit and the  would be Mans[facility] [phone].      ADI updated the pt and will continue to follow.    RASHID David  Case Management Department

## 2024-09-04 NOTE — PROGRESS NOTES
Bedside and Verbal shift change report given to Shalonda Catherine (oncoming nurse) by Danita (offgoing nurse). Report included the following information Nurse Handoff Report, Adult Overview, Intake/Output, MAR, and Recent Results.

## 2024-09-04 NOTE — CARE COORDINATION
09/02/24 1138   /Social Work Whiteboard Notes   /Social Work Whiteboard RED 9/4 - plan for surgery on Thursday and SHIRA is Friday, will DC to [facility] vs [facility]       ADI pending response from [facility], pt prefers this facility. SW placed call to this facility however, received no answer. ADI also sent a message via [facility] [employer] Link and Is pending a response.    ADI met with pt at bedside to discuss the above, pt understands that she will admit to [facility] in the event [facility] can't accommodate her.     RASHID David  Case Management Department

## 2024-09-04 NOTE — PROGRESS NOTES
2000  Patient in bed awake, A/O x4, speech clear, hearing intact, patient can weight bare on right leg as tolerated but has only gotten up with PT, continent of urine and stool. Patient's last bowel movement was 9/1. Patient has new pure wick in place set to wall suction. On room air, lung sounds clear, respirations unlabored. IV dressings clean, dry, and intact. Dressing on knee include steri strips, abd, and then ace wrapped. Dressing on right knee is currently clean, dry, and intact. Patient has robert hose and SCDs in place. Radial and pedal pulses present bilaterally, capillary refill <3 seconds to fingers and toes. Abdomen soft, bowel sounds active. Moderate hand  noted to bilateral upper extremities. Side rails x3 up, bed locked and in lowest position, bed alarm on, call bell and bedside table within reach, needs attended, denies any pain, SOB, or concerns at present.

## 2024-09-04 NOTE — PROGRESS NOTES
[facility]  (A Division of [facility])                                                           Date of Admission: 8/24/2024       Reason for Consult: Possible Right knee infection  Referring MD: Dr Suarez    C/C: right knee pain/swelling    Current Antimicrobials:    Prior Antimicrobials:    Ceftriaxone 2 gm IV daily 8/30 to date   Zosyn IV  8/25 to 27  Vanco 8/25 to 8/30  Cefepime 8/29 to 8/30     Allergy to antibiotics: NA       Assessment--ID related:     Immunosuppressed patient, due to Tociluzumab treatment with:    Probable R knee TKA infection? Late--Seedling? E.coli- pan sensitive from OR culture    S/P OR 8/27--arthroscopic debridment and washout--gross synovitis, large and displaced lateral meniscal tear with no finding of purulence or gross infection per op note  --BL partial TKA >20 years ago  --Right partial TKA- pain from 03/2023  --increasing swelling, unable to walk from last week.   --denies prior po abx, prior injection to her knee  --joint tap 05/2024-- WBC bloody- 3375- 91% N, no growth. Outside lab PCR in process  --ESR 1, CRP 0.9--8/25/24    Right poplitieal fossa baker cyst-- 4.6X3.3X2.0 cm - ON PVL    Abn LFT- ALT/AST trenging down, Total bili/ AP- normal    Microlab data:    8/7-MRSA screening-negative       --urine culture negative  8/25- Blood culture X2- NG           UA-no pyuria  8/27-- OR culture X2-- GNR 1/2 specimen, E.coli-- panSS-- prelim, AF/Fungal stain and cultures NGTD         -- broad bacterial PCR sent out-result --in process       Co-morbidities:    TA on immunosuppression-  treatment X1 year. Tociluzumab on hold and steroid tapered down  GERD  History of ORIF for R clavicle fracture     Additional data:    CT KNEE RIGHT W CONTRAST    Result Date: 8/24/2024  CT right knee. COMPARISON: 7/3/2024.   IMPRESSION: Partial right knee replacement. 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 (   with spontaneous and phasic venous waveforms seen in right common femoral, superficial femoral, and popliteal veins. Flow demonstrated in right external iliac, greater saphenous, profunda femoral, gastrocnemius, posterior tibial, and peroneal veins and left external iliac and common femoral veins. 4.6 x 3.3 x 2.0 cm Baker's cyst right popliteal fossa.    IMPRESSION: 1. No evidence of deep venous thrombosis right lower extremity. 2. Moderate right Baker's cyst. Reading Doctor: Sean Pat Electronic Signature by: Sean Pat    CT KNEE RIGHT W CONTRAST    Result Date: 8/24/2024  CT right knee. COMPARISON: 7/3/2024. CLINICAL INFORMATION: Partial right knee replacement. Pain, swelling. FINDINGS: Axial source as well as sagittal and coronal reformatted images were with obtained without IV contrast. A partial right knee replacement is identified involving the medial joint space. Previously identified. Periprosthetic osteolysis within the medial aspect of the proximal tibia has increased in comparison to the previous study, now measuring 3.3 x 2.6 cm in diameter compared to 2.2 x 1.7 cm. There is no evidence acute fracture identified. A suprapatellar joint effusion has decreased in size in comparison to the previous study. A right popliteal cyst has decreased in size in comparison to the previous study.    IMPRESSION: Partial right knee replacement. 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 Reading Doctor: Yony Jensen Electronic Signature by: Yony Jensen MD  [facility])  Office #:     [phone]  [phone] Option #[phone]   Office Fax: [phone]

## 2024-09-04 NOTE — PROGRESS NOTES
Hospitalist Progress Note-critical care note     Patient: Lanny Elliott MRN: [id number]  [facility]: [id number]    YOB: 1945  Age: 79 y.o.  Sex: female    DOA: 8/24/2024 LOS:  LOS: 11 days            Chief complaint: septic joint hypokalemia , htn ,     Assessment/Plan         Active Hospital Problems    Diagnosis Date Noted    Constipation [K59.00] 08/28/2024    Septic joint (HCC) [M00.9] 08/25/2024    Temporal arteritis (HCC) [M31.6] 08/25/2024    Hypokalemia [E87.6] 08/25/2024    HTN (hypertension) [I10] 08/25/2024        Knee effusion, septic joint   washout on  8/27 planning   ID consult already on Vanc/zosyn-change to ancef and vanc -change back vanc and zosyn-now on rocephin per id recommendation   Revision Right total knee with explant of hardware insertion of antibiotic spacer     Positive bcx : 1/2 bottle ecoli  on  zosyn now   Wound cx ecoli will continue f/u with ID recommendation        Temporal arthritis last Actemra dose August 13  Resume prednisone 4 mg daily  Follow sed rate and CRP        Hypokalemia, replaced and will continue to monitoring -will repeat lab tomorrow      Hypertension aggravated by pain but not on regular medicines at home   Hydralazine as needed  Pain controlled bp still elevated lisinopril 20 and hctz 12.5 added yesterday, sbp 150s -will increase lisinopril to 40-check bmp tomorrow   Continue norvasc   echo done with normal pasp 37        Constipation : resolving continue colace and marilax and prune juice and increase physical activity and avoid narcotics if can     Subjective feel fine, pain is controlled and ready to have surgery done . I need talk with CM for the placement -where I need to go     TIME: E/M Time spent with patient and patient care issues: [] 31-40 mins  [x] 41-49 mins  [] 50 mins or more.      Disposition :per primary team   Review of systems:    General: No fevers or chills.  Cardiovascular: No chest pain or pressure. No palpitations.  reviewed prior to creation of Plan    US DUPLEX LOWER EXTREMITY VEINS RIGHT    Result Date: 8/24/2024  CLINICAL HISTORY: Right knee and calf swelling x3 months. COMPARISON: Right knee CT 8/24/2024. FINDINGS: Grayscale and color and spectral Doppler imaging performed of right lower extremity. Flow and compressibility with spontaneous and phasic venous waveforms seen in right common femoral, superficial femoral, and popliteal veins. Flow demonstrated in right external iliac, greater saphenous, profunda femoral, gastrocnemius, posterior tibial, and peroneal veins and left external iliac and common femoral veins. 4.6 x 3.3 x 2.0 cm Baker's cyst right popliteal fossa.    IMPRESSION: 1. No evidence of deep venous thrombosis right lower extremity. 2. Moderate right Baker's cyst. Reading Doctor: Sean Pat Electronic Signature by: Sean Pat    CT KNEE RIGHT W CONTRAST    Result Date: 8/24/2024  CT right knee. COMPARISON: 7/3/2024. CLINICAL INFORMATION: Partial right knee replacement. Pain, swelling. FINDINGS: Axial source as well as sagittal and coronal reformatted images were with obtained without IV contrast. A partial right knee replacement is identified involving the medial joint space. Previously identified. Periprosthetic osteolysis within the medial aspect of the proximal tibia has increased in comparison to the previous study, now measuring 3.3 x 2.6 cm in diameter compared to 2.2 x 1.7 cm. There is no evidence acute fracture identified. A suprapatellar joint effusion has decreased in size in comparison to the previous study. A right popliteal cyst has decreased in size in comparison to the previous study.    IMPRESSION: Partial right knee replacement. 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 Reading Doctor: Yony Jensen Electronic Signature by: Yony Jensen    XRAY KNEE 1-2 VIEWS RIGHT    Result Date: 8/24/2024  Right knee. Compared: 11/13/2018. CLINICAL INFORMATION: Pain,

## 2024-09-04 NOTE — PROGRESS NOTES
9546  Patient refused bed bath this morning due to pain in her right knee and right foot. Patient rated pain 10/10. Administered Roxicodone and scheduled tylenol.

## 2024-09-04 NOTE — PROGRESS NOTES
Physical Therapy      1238: Pt reports L heel pain, floated (B) heels with a pillow and educated on pressure sores.  Will follow up again for PT as foot calms down.

## 2024-09-04 NOTE — PROGRESS NOTES
Progress Note      Patient: Lanny Elliott               Sex: female          DOA: 8/24/2024       YOB: 1945      Age:  79 y.o.        LOS:  LOS: 11 days            Subjective:   No new ortho complaints. Pending surgery tomorrow.    Objective:      Visit Vitals  BP (!) 133/52   Pulse 86   Temp 97.9 °F (36.6 °C) (Oral)   Resp 16   Ht 1.651 m (5' 5\")   Wt 57.6 kg (127 lb)   SpO2 97%   BMI 21.13 kg/m²       Physical Exam:   Dressing:  clean, dry, intact  Wiggles Toes/Ankle  Foot sensation intact to light touch  No foot edema/ +1 Posterior Tibial Pulse    Intake and Output:  Current Shift:  No intake/output data recorded.  Last three shifts:  09/02 1901 - 09/04 0700  In: -   Out: 1400 [Urine:1400]  Voiding Status:  +void without need for danielle catheter    Lab/Data Reviewed:  Recent Labs     09/04/24  0535   HGB 11.1*   HCT 32.9*      K 4.5      CO2 27   BUN 8       Medications Reviewed    Assessment/Plan     Principal Problem:    Septic joint (HCC)  Active Problems:    Temporal arteritis (HCC)    Hypokalemia    HTN (hypertension)    Constipation  Resolved Problems:    * No resolved hospital problems. *      Continue DVT Prophylaxis.  Plan for surgery tomorrow - Revision Right total knee with explant of hardware insertion of antibiotic spacer  NPO after midnight  Continue OOB and WBAT with PT            The patient's plan of care was discussed with Dr. Jarrett today as well and he is in agreement with above.

## 2024-09-05 ENCOUNTER — APPOINTMENT (OUTPATIENT)
Facility: HOSPITAL | Age: 79
DRG: 467 | End: 2024-09-05
Attending: ORTHOPAEDIC SURGERY
Payer: MEDICARE

## 2024-09-05 ENCOUNTER — ANESTHESIA EVENT (OUTPATIENT)
Facility: HOSPITAL | Age: 79
End: 2024-09-05
Payer: MEDICARE

## 2024-09-05 ENCOUNTER — ANESTHESIA (OUTPATIENT)
Facility: HOSPITAL | Age: 79
End: 2024-09-05
Payer: MEDICARE

## 2024-09-05 LAB
ABO + RH BLD: NORMAL
ALBUMIN SERPL-MCNC: 3.4 G/DL (ref 3.4–5)
ALBUMIN/GLOB SERPL: 1.3 (ref 0.8–1.7)
ALP SERPL-CCNC: 59 U/L (ref 45–117)
ALT SERPL-CCNC: 83 U/L (ref 13–56)
ANION GAP SERPL CALC-SCNC: 6 MMOL/L (ref 3–18)
AST SERPL-CCNC: 27 U/L (ref 10–38)
BASOPHILS # BLD: 0.2 K/UL (ref 0–0.1)
BASOPHILS NFR BLD: 2 % (ref 0–2)
BILIRUB DIRECT SERPL-MCNC: 0.1 MG/DL (ref 0–0.2)
BILIRUB SERPL-MCNC: 0.4 MG/DL (ref 0.2–1)
BLOOD GROUP ANTIBODIES SERPL: NORMAL
BUN SERPL-MCNC: 10 MG/DL (ref 7–18)
BUN/CREAT SERPL: 16 (ref 12–20)
CALCIUM SERPL-MCNC: 10.1 MG/DL (ref 8.5–10.1)
CHLORIDE SERPL-SCNC: 101 MMOL/L (ref 100–111)
CO2 SERPL-SCNC: 30 MMOL/L (ref 21–32)
CREAT SERPL-MCNC: 0.61 MG/DL (ref 0.6–1.3)
CRP SERPL-MCNC: <0.3 MG/DL (ref 0–0.3)
DIFFERENTIAL METHOD BLD: ABNORMAL
EOSINOPHIL # BLD: 0.5 K/UL (ref 0–0.4)
EOSINOPHIL NFR BLD: 4 % (ref 0–5)
ERYTHROCYTE [DISTWIDTH] IN BLOOD BY AUTOMATED COUNT: 15.6 % (ref 11.6–14.5)
ERYTHROCYTE [SEDIMENTATION RATE] IN BLOOD: 1 MM/HR (ref 0–30)
GLOBULIN SER CALC-MCNC: 2.6 G/DL (ref 2–4)
GLUCOSE SERPL-MCNC: 117 MG/DL (ref 74–99)
HBV SURFACE AG SER QL: 0.21 INDEX
HBV SURFACE AG SER QL: NEGATIVE
HCT VFR BLD AUTO: 34 % (ref 35–45)
HGB BLD-MCNC: 11.2 G/DL (ref 12–16)
IMM GRANULOCYTES # BLD AUTO: 0.5 K/UL (ref 0–0.04)
IMM GRANULOCYTES NFR BLD AUTO: 5 % (ref 0–0.5)
LYMPHOCYTES # BLD: 1 K/UL (ref 0.9–3.6)
LYMPHOCYTES NFR BLD: 9 % (ref 21–52)
MCH RBC QN AUTO: 30.4 PG (ref 24–34)
MCHC RBC AUTO-ENTMCNC: 32.9 G/DL (ref 31–37)
MCV RBC AUTO: 92.1 FL (ref 78–100)
MONOCYTES # BLD: 1 K/UL (ref 0.05–1.2)
MONOCYTES NFR BLD: 9 % (ref 3–10)
NEUTS SEG # BLD: 8.3 K/UL (ref 1.8–8)
NEUTS SEG NFR BLD: 72 % (ref 40–73)
NRBC # BLD: 0.02 K/UL (ref 0–0.01)
NRBC BLD-RTO: 0.2 PER 100 WBC
PLATELET # BLD AUTO: 406 K/UL (ref 135–420)
PMV BLD AUTO: 9.2 FL (ref 9.2–11.8)
POTASSIUM SERPL-SCNC: 4.8 MMOL/L (ref 3.5–5.5)
PROT SERPL-MCNC: 6 G/DL (ref 6.4–8.2)
RBC # BLD AUTO: 3.69 M/UL (ref 4.2–5.3)
SODIUM SERPL-SCNC: 137 MMOL/L (ref 136–145)
SPECIMEN EXP DATE BLD: NORMAL
WBC # BLD AUTO: 11.5 K/UL (ref 4.6–13.2)

## 2024-09-05 PROCEDURE — 7100000000 HC PACU RECOVERY - FIRST 15 MIN: Performed by: ORTHOPAEDIC SURGERY

## 2024-09-05 PROCEDURE — 86850 RBC ANTIBODY SCREEN: CPT

## 2024-09-05 PROCEDURE — 86706 HEP B SURFACE ANTIBODY: CPT

## 2024-09-05 PROCEDURE — 86704 HEP B CORE ANTIBODY TOTAL: CPT

## 2024-09-05 PROCEDURE — 36415 COLL VENOUS BLD VENIPUNCTURE: CPT

## 2024-09-05 PROCEDURE — 2720000010 HC SURG SUPPLY STERILE: Performed by: ORTHOPAEDIC SURGERY

## 2024-09-05 PROCEDURE — 2500000003 HC RX 250 WO HCPCS

## 2024-09-05 PROCEDURE — 2580000003 HC RX 258: Performed by: ORTHOPAEDIC SURGERY

## 2024-09-05 PROCEDURE — 86140 C-REACTIVE PROTEIN: CPT

## 2024-09-05 PROCEDURE — 2580000003 HC RX 258: Performed by: PHYSICIAN ASSISTANT

## 2024-09-05 PROCEDURE — 6370000000 HC RX 637 (ALT 250 FOR IP): Performed by: PHYSICIAN ASSISTANT

## 2024-09-05 PROCEDURE — 3700000001 HC ADD 15 MINUTES (ANESTHESIA): Performed by: ORTHOPAEDIC SURGERY

## 2024-09-05 PROCEDURE — C1776 JOINT DEVICE (IMPLANTABLE): HCPCS | Performed by: ORTHOPAEDIC SURGERY

## 2024-09-05 PROCEDURE — L1830 KO IMMOB CANVAS LONG PRE OTS: HCPCS | Performed by: ORTHOPAEDIC SURGERY

## 2024-09-05 PROCEDURE — 86900 BLOOD TYPING SEROLOGIC ABO: CPT

## 2024-09-05 PROCEDURE — C1713 ANCHOR/SCREW BN/BN,TIS/BN: HCPCS | Performed by: ORTHOPAEDIC SURGERY

## 2024-09-05 PROCEDURE — 7100000001 HC PACU RECOVERY - ADDTL 15 MIN: Performed by: ORTHOPAEDIC SURGERY

## 2024-09-05 PROCEDURE — 87340 HEPATITIS B SURFACE AG IA: CPT

## 2024-09-05 PROCEDURE — 86803 HEPATITIS C AB TEST: CPT

## 2024-09-05 PROCEDURE — 3700000000 HC ANESTHESIA ATTENDED CARE: Performed by: ORTHOPAEDIC SURGERY

## 2024-09-05 PROCEDURE — 86901 BLOOD TYPING SEROLOGIC RH(D): CPT

## 2024-09-05 PROCEDURE — 1100000000 HC RM PRIVATE

## 2024-09-05 PROCEDURE — A4217 STERILE WATER/SALINE, 500 ML: HCPCS | Performed by: ORTHOPAEDIC SURGERY

## 2024-09-05 PROCEDURE — 3600000012 HC SURGERY LEVEL 2 ADDTL 15MIN: Performed by: ORTHOPAEDIC SURGERY

## 2024-09-05 PROCEDURE — 6360000002 HC RX W HCPCS

## 2024-09-05 PROCEDURE — 64447 NJX AA&/STRD FEMORAL NRV IMG: CPT | Performed by: ANESTHESIOLOGY

## 2024-09-05 PROCEDURE — 6370000000 HC RX 637 (ALT 250 FOR IP): Performed by: INTERNAL MEDICINE

## 2024-09-05 PROCEDURE — 6360000002 HC RX W HCPCS: Performed by: ORTHOPAEDIC SURGERY

## 2024-09-05 PROCEDURE — 3600000002 HC SURGERY LEVEL 2 BASE: Performed by: ORTHOPAEDIC SURGERY

## 2024-09-05 PROCEDURE — 76705 ECHO EXAM OF ABDOMEN: CPT

## 2024-09-05 PROCEDURE — 2500000003 HC RX 250 WO HCPCS: Performed by: ORTHOPAEDIC SURGERY

## 2024-09-05 PROCEDURE — 0SRC0EZ REPLACEMENT OF RIGHT KNEE JOINT WITH ARTICULATING SPACER, OPEN APPROACH: ICD-10-PCS | Performed by: ORTHOPAEDIC SURGERY

## 2024-09-05 PROCEDURE — 73560 X-RAY EXAM OF KNEE 1 OR 2: CPT

## 2024-09-05 PROCEDURE — 2580000003 HC RX 258: Performed by: INTERNAL MEDICINE

## 2024-09-05 PROCEDURE — C9290 INJ, BUPIVACAINE LIPOSOME: HCPCS | Performed by: ORTHOPAEDIC SURGERY

## 2024-09-05 PROCEDURE — 80076 HEPATIC FUNCTION PANEL: CPT

## 2024-09-05 PROCEDURE — 6370000000 HC RX 637 (ALT 250 FOR IP): Performed by: HOSPITALIST

## 2024-09-05 PROCEDURE — 2709999900 HC NON-CHARGEABLE SUPPLY: Performed by: ORTHOPAEDIC SURGERY

## 2024-09-05 PROCEDURE — 85652 RBC SED RATE AUTOMATED: CPT

## 2024-09-05 PROCEDURE — 80048 BASIC METABOLIC PNL TOTAL CA: CPT

## 2024-09-05 PROCEDURE — 6360000002 HC RX W HCPCS: Performed by: INTERNAL MEDICINE

## 2024-09-05 PROCEDURE — 6360000002 HC RX W HCPCS: Performed by: ANESTHESIOLOGY

## 2024-09-05 PROCEDURE — 0SPC0JZ REMOVAL OF SYNTHETIC SUBSTITUTE FROM RIGHT KNEE JOINT, OPEN APPROACH: ICD-10-PCS | Performed by: ORTHOPAEDIC SURGERY

## 2024-09-05 PROCEDURE — 2500000003 HC RX 250 WO HCPCS: Performed by: ANESTHESIOLOGY

## 2024-09-05 PROCEDURE — 85025 COMPLETE CBC W/AUTO DIFF WBC: CPT

## 2024-09-05 PROCEDURE — 6370000000 HC RX 637 (ALT 250 FOR IP): Performed by: ORTHOPAEDIC SURGERY

## 2024-09-05 DEVICE — ATTUNE KNEE SYSTEM ALL POLY TIBIAL IMPLANT CRUCIATE RETAINING SIZE 4, 10MM AOX
Type: IMPLANTABLE DEVICE | Site: KNEE | Status: FUNCTIONAL
Brand: ATTUNE

## 2024-09-05 DEVICE — ATTUNE PATELLA MEDIALIZED DOME 35MM CEMENTED AOX
Type: IMPLANTABLE DEVICE | Site: KNEE | Status: FUNCTIONAL
Brand: ATTUNE

## 2024-09-05 DEVICE — ATTUNE KNEE SYSTEM FEMORAL CRUCIATE RETAINING SIZE 5 RIGHT CEMENTED
Type: IMPLANTABLE DEVICE | Site: KNEE | Status: FUNCTIONAL
Brand: ATTUNE

## 2024-09-05 DEVICE — STIMULAN® RAPID CURE PROVIDED STERILE FOR SINGLE PATIENT USE. STIMULAN® RAPID CURE CONTAINS CALCIUM SULFATE POWDER AND MIXING SOLUTION IN PRE-MEASURED QUANTITIES SO THAT WHEN MIXED TOGETHER IN A STERILE MIXING BOWL, THE RESULTANT PASTE IS TO BE DIGITALLY PACKED INTO OPEN BONE VOID/GAP TO SET INSITU OR PLACED INTO THE MOULD PROVIDED, THE MIXTURE SETS TO FORM BEADS. THE BIODEGRADABLE, RADIOPAQUE BEADS ARE RESORBED IN APPROXIMATELY 30 – 60 DAYS WHEN USED IN ACCORDANCE WITH THE DEVICE LABELLING. STIMULAN® RAPID CURE IS MANUFACTURED FROM SYNTHETIC IMPLANT GRADE CALCIUM SULFATE DIHYDRATE(CASO4.2H2O) THAT RESORBS AND IS REPLACED WITH BONE DURING THE HEALING PROCESS. ALSO, AS THE BONE VOID FILLER BEADS ARE BIODEGRADABLE AND BIOCOMPATIBLE, THEY MAY BE USED AT AN INFECTED SITE.
Type: IMPLANTABLE DEVICE | Site: KNEE | Status: FUNCTIONAL
Brand: STIMULAN® RAPID CURE

## 2024-09-05 DEVICE — SMARTSET GHV GENTAMICIN HIGH VISCOSITY BONE CEMENT 40G
Type: IMPLANTABLE DEVICE | Site: KNEE | Status: FUNCTIONAL
Brand: SMARTSET

## 2024-09-05 RX ORDER — ONDANSETRON 2 MG/ML
INJECTION INTRAMUSCULAR; INTRAVENOUS PRN
Status: DISCONTINUED | OUTPATIENT
Start: 2024-09-05 | End: 2024-09-05 | Stop reason: SDUPTHER

## 2024-09-05 RX ORDER — DEXMEDETOMIDINE HYDROCHLORIDE 100 UG/ML
INJECTION, SOLUTION INTRAVENOUS PRN
Status: DISCONTINUED | OUTPATIENT
Start: 2024-09-05 | End: 2024-09-05 | Stop reason: SDUPTHER

## 2024-09-05 RX ORDER — CEFAZOLIN SODIUM 1 G/3ML
INJECTION, POWDER, FOR SOLUTION INTRAMUSCULAR; INTRAVENOUS PRN
Status: DISCONTINUED | OUTPATIENT
Start: 2024-09-05 | End: 2024-09-05 | Stop reason: SDUPTHER

## 2024-09-05 RX ORDER — NALOXONE HYDROCHLORIDE 0.4 MG/ML
INJECTION, SOLUTION INTRAMUSCULAR; INTRAVENOUS; SUBCUTANEOUS PRN
Status: DISCONTINUED | OUTPATIENT
Start: 2024-09-05 | End: 2024-09-05 | Stop reason: HOSPADM

## 2024-09-05 RX ORDER — DEXAMETHASONE SODIUM PHOSPHATE 10 MG/ML
INJECTION, SOLUTION INTRAMUSCULAR; INTRAVENOUS
Status: DISPENSED
Start: 2024-09-05 | End: 2024-09-06

## 2024-09-05 RX ORDER — OXYCODONE HYDROCHLORIDE 5 MG/1
10 TABLET ORAL PRN
Status: DISCONTINUED | OUTPATIENT
Start: 2024-09-05 | End: 2024-09-05 | Stop reason: HOSPADM

## 2024-09-05 RX ORDER — HYDROMORPHONE HYDROCHLORIDE 1 MG/ML
0.5 INJECTION, SOLUTION INTRAMUSCULAR; INTRAVENOUS; SUBCUTANEOUS EVERY 5 MIN PRN
Status: DISCONTINUED | OUTPATIENT
Start: 2024-09-05 | End: 2024-09-05 | Stop reason: HOSPADM

## 2024-09-05 RX ORDER — FENTANYL CITRATE 50 UG/ML
INJECTION, SOLUTION INTRAMUSCULAR; INTRAVENOUS
Status: DISPENSED
Start: 2024-09-05 | End: 2024-09-06

## 2024-09-05 RX ORDER — EPHEDRINE SULFATE/0.9% NACL/PF 25 MG/5 ML
SYRINGE (ML) INTRAVENOUS PRN
Status: DISCONTINUED | OUTPATIENT
Start: 2024-09-05 | End: 2024-09-05 | Stop reason: SDUPTHER

## 2024-09-05 RX ORDER — SODIUM CHLORIDE 0.9 % (FLUSH) 0.9 %
5-40 SYRINGE (ML) INJECTION EVERY 12 HOURS SCHEDULED
Status: DISCONTINUED | OUTPATIENT
Start: 2024-09-05 | End: 2024-09-07 | Stop reason: HOSPADM

## 2024-09-05 RX ORDER — ROPIVACAINE HYDROCHLORIDE 5 MG/ML
INJECTION, SOLUTION EPIDURAL; INFILTRATION; PERINEURAL PRN
Status: DISCONTINUED | OUTPATIENT
Start: 2024-09-05 | End: 2024-09-05 | Stop reason: SDUPTHER

## 2024-09-05 RX ORDER — VANCOMYCIN HYDROCHLORIDE 1 G/20ML
INJECTION, POWDER, LYOPHILIZED, FOR SOLUTION INTRAVENOUS PRN
Status: DISCONTINUED | OUTPATIENT
Start: 2024-09-05 | End: 2024-09-05 | Stop reason: ALTCHOICE

## 2024-09-05 RX ORDER — DEXMEDETOMIDINE HYDROCHLORIDE 100 UG/ML
INJECTION, SOLUTION INTRAVENOUS
Status: COMPLETED
Start: 2024-09-05 | End: 2024-09-05

## 2024-09-05 RX ORDER — DEXAMETHASONE SODIUM PHOSPHATE 10 MG/ML
INJECTION, SOLUTION INTRAMUSCULAR; INTRAVENOUS PRN
Status: DISCONTINUED | OUTPATIENT
Start: 2024-09-05 | End: 2024-09-05 | Stop reason: SDUPTHER

## 2024-09-05 RX ORDER — FENTANYL CITRATE 50 UG/ML
50 INJECTION, SOLUTION INTRAMUSCULAR; INTRAVENOUS EVERY 5 MIN PRN
Status: DISCONTINUED | OUTPATIENT
Start: 2024-09-05 | End: 2024-09-05 | Stop reason: HOSPADM

## 2024-09-05 RX ORDER — ACETAMINOPHEN 325 MG/1
650 TABLET ORAL EVERY 6 HOURS
Status: DISCONTINUED | OUTPATIENT
Start: 2024-09-05 | End: 2024-09-06

## 2024-09-05 RX ORDER — FENTANYL CITRATE 50 UG/ML
INJECTION, SOLUTION INTRAMUSCULAR; INTRAVENOUS PRN
Status: DISCONTINUED | OUTPATIENT
Start: 2024-09-05 | End: 2024-09-05 | Stop reason: SDUPTHER

## 2024-09-05 RX ORDER — PROPOFOL 10 MG/ML
INJECTION, EMULSION INTRAVENOUS PRN
Status: DISCONTINUED | OUTPATIENT
Start: 2024-09-05 | End: 2024-09-05 | Stop reason: SDUPTHER

## 2024-09-05 RX ORDER — ONDANSETRON 2 MG/ML
4 INJECTION INTRAMUSCULAR; INTRAVENOUS
Status: DISCONTINUED | OUTPATIENT
Start: 2024-09-05 | End: 2024-09-05 | Stop reason: HOSPADM

## 2024-09-05 RX ORDER — DEXAMETHASONE SODIUM PHOSPHATE 4 MG/ML
INJECTION, SOLUTION INTRA-ARTICULAR; INTRALESIONAL; INTRAMUSCULAR; INTRAVENOUS; SOFT TISSUE PRN
Status: DISCONTINUED | OUTPATIENT
Start: 2024-09-05 | End: 2024-09-05 | Stop reason: SDUPTHER

## 2024-09-05 RX ORDER — OXYCODONE HYDROCHLORIDE 5 MG/1
5 TABLET ORAL PRN
Status: DISCONTINUED | OUTPATIENT
Start: 2024-09-05 | End: 2024-09-05 | Stop reason: HOSPADM

## 2024-09-05 RX ORDER — SODIUM CHLORIDE, SODIUM LACTATE, POTASSIUM CHLORIDE, CALCIUM CHLORIDE 600; 310; 30; 20 MG/100ML; MG/100ML; MG/100ML; MG/100ML
INJECTION, SOLUTION INTRAVENOUS CONTINUOUS
Status: DISCONTINUED | OUTPATIENT
Start: 2024-09-05 | End: 2024-09-06

## 2024-09-05 RX ORDER — TRANEXAMIC ACID 10 MG/ML
INJECTION, SOLUTION INTRAVENOUS PRN
Status: DISCONTINUED | OUTPATIENT
Start: 2024-09-05 | End: 2024-09-05 | Stop reason: SDUPTHER

## 2024-09-05 RX ORDER — SODIUM CHLORIDE 0.9 % (FLUSH) 0.9 %
5-40 SYRINGE (ML) INJECTION PRN
Status: DISCONTINUED | OUTPATIENT
Start: 2024-09-05 | End: 2024-09-07 | Stop reason: HOSPADM

## 2024-09-05 RX ORDER — RIFAMPIN 300 MG/1
CAPSULE ORAL PRN
Status: DISCONTINUED | OUTPATIENT
Start: 2024-09-05 | End: 2024-09-05 | Stop reason: ALTCHOICE

## 2024-09-05 RX ORDER — LIDOCAINE HYDROCHLORIDE 20 MG/ML
INJECTION, SOLUTION INTRAVENOUS PRN
Status: DISCONTINUED | OUTPATIENT
Start: 2024-09-05 | End: 2024-09-05 | Stop reason: SDUPTHER

## 2024-09-05 RX ORDER — MAGNESIUM HYDROXIDE 1200 MG/15ML
LIQUID ORAL CONTINUOUS PRN
Status: COMPLETED | OUTPATIENT
Start: 2024-09-05 | End: 2024-09-05

## 2024-09-05 RX ORDER — SODIUM CHLORIDE 9 MG/ML
INJECTION, SOLUTION INTRAVENOUS PRN
Status: DISCONTINUED | OUTPATIENT
Start: 2024-09-05 | End: 2024-09-07 | Stop reason: HOSPADM

## 2024-09-05 RX ORDER — LABETALOL HYDROCHLORIDE 5 MG/ML
INJECTION, SOLUTION INTRAVENOUS PRN
Status: DISCONTINUED | OUTPATIENT
Start: 2024-09-05 | End: 2024-09-05 | Stop reason: SDUPTHER

## 2024-09-05 RX ADMIN — SODIUM CHLORIDE, SODIUM LACTATE, POTASSIUM CHLORIDE, AND CALCIUM CHLORIDE: 600; 310; 30; 20 INJECTION, SOLUTION INTRAVENOUS at 14:44

## 2024-09-05 RX ADMIN — HYDROMORPHONE HYDROCHLORIDE 0.3 MG: 1 INJECTION, SOLUTION INTRAMUSCULAR; INTRAVENOUS; SUBCUTANEOUS at 12:47

## 2024-09-05 RX ADMIN — LABETALOL HYDROCHLORIDE 5 MG: 5 INJECTION, SOLUTION INTRAVENOUS at 13:19

## 2024-09-05 RX ADMIN — Medication 625 MG: at 21:12

## 2024-09-05 RX ADMIN — DEXAMETHASONE SODIUM PHOSPHATE 4 MG: 4 INJECTION INTRA-ARTICULAR; INTRALESIONAL; INTRAMUSCULAR; INTRAVENOUS; SOFT TISSUE at 12:12

## 2024-09-05 RX ADMIN — ROPIVACAINE HYDROCHLORIDE 25 ML: 5 INJECTION, SOLUTION EPIDURAL; INFILTRATION; PERINEURAL at 12:12

## 2024-09-05 RX ADMIN — LIDOCAINE HYDROCHLORIDE 80 MG: 20 INJECTION, SOLUTION INTRAVENOUS at 12:37

## 2024-09-05 RX ADMIN — LISINOPRIL 40 MG: 20 TABLET ORAL at 10:26

## 2024-09-05 RX ADMIN — PROPOFOL 30 MG: 10 INJECTION, EMULSION INTRAVENOUS at 13:06

## 2024-09-05 RX ADMIN — DEXAMETHASONE SODIUM PHOSPHATE 4 MG: 10 INJECTION, SOLUTION INTRAMUSCULAR; INTRAVENOUS at 12:12

## 2024-09-05 RX ADMIN — POLYETHYLENE GLYCOL 3350 17 G: 17 POWDER, FOR SOLUTION ORAL at 21:12

## 2024-09-05 RX ADMIN — CEFAZOLIN 2 G: 1 INJECTION, POWDER, FOR SOLUTION INTRAMUSCULAR; INTRAVENOUS at 12:50

## 2024-09-05 RX ADMIN — ONDANSETRON 4 MG: 2 INJECTION INTRAMUSCULAR; INTRAVENOUS at 12:54

## 2024-09-05 RX ADMIN — OXYCODONE HYDROCHLORIDE 5 MG: 5 TABLET ORAL at 21:10

## 2024-09-05 RX ADMIN — TRANEXAMIC ACID 1 G: 10 INJECTION, SOLUTION INTRAVENOUS at 12:46

## 2024-09-05 RX ADMIN — EPHEDRINE SULFATE 5 MG: 5 INJECTION INTRAVENOUS at 13:31

## 2024-09-05 RX ADMIN — DOCUSATE SODIUM 100 MG: 100 CAPSULE, LIQUID FILLED ORAL at 21:11

## 2024-09-05 RX ADMIN — HYDROCHLOROTHIAZIDE 12.5 MG: 25 TABLET ORAL at 10:27

## 2024-09-05 RX ADMIN — DEXMEDETOMIDINE HYDROCHLORIDE 20 MCG: 100 INJECTION, SOLUTION INTRAVENOUS at 12:12

## 2024-09-05 RX ADMIN — FENTANYL CITRATE 100 MCG: 50 INJECTION, SOLUTION INTRAMUSCULAR; INTRAVENOUS at 12:10

## 2024-09-05 RX ADMIN — MELOXICAM 15 MG: 7.5 TABLET ORAL at 10:28

## 2024-09-05 RX ADMIN — DEXMEDETOMIDINE HYDROCHLORIDE 4 MCG: 100 INJECTION, SOLUTION INTRAVENOUS at 13:06

## 2024-09-05 RX ADMIN — ACETAMINOPHEN 650 MG: 325 TABLET ORAL at 21:11

## 2024-09-05 RX ADMIN — SODIUM CHLORIDE, PRESERVATIVE FREE 10 ML: 5 INJECTION INTRAVENOUS at 21:12

## 2024-09-05 RX ADMIN — EPHEDRINE SULFATE 5 MG: 5 INJECTION INTRAVENOUS at 12:47

## 2024-09-05 RX ADMIN — HYDROMORPHONE HYDROCHLORIDE 0.3 MG: 1 INJECTION, SOLUTION INTRAMUSCULAR; INTRAVENOUS; SUBCUTANEOUS at 12:30

## 2024-09-05 RX ADMIN — WATER 1000 MG: 1 INJECTION INTRAMUSCULAR; INTRAVENOUS; SUBCUTANEOUS at 17:03

## 2024-09-05 RX ADMIN — SODIUM CHLORIDE, SODIUM LACTATE, POTASSIUM CHLORIDE, AND CALCIUM CHLORIDE: 600; 310; 30; 20 INJECTION, SOLUTION INTRAVENOUS at 20:04

## 2024-09-05 RX ADMIN — TRANEXAMIC ACID 1 G: 10 INJECTION, SOLUTION INTRAVENOUS at 13:56

## 2024-09-05 RX ADMIN — PROPOFOL 100 MG: 10 INJECTION, EMULSION INTRAVENOUS at 12:37

## 2024-09-05 RX ADMIN — SODIUM CHLORIDE, SODIUM LACTATE, POTASSIUM CHLORIDE, AND CALCIUM CHLORIDE: 600; 310; 30; 20 INJECTION, SOLUTION INTRAVENOUS at 12:29

## 2024-09-05 RX ADMIN — HYDROMORPHONE HYDROCHLORIDE 0.4 MG: 1 INJECTION, SOLUTION INTRAMUSCULAR; INTRAVENOUS; SUBCUTANEOUS at 12:57

## 2024-09-05 ASSESSMENT — PAIN DESCRIPTION - ORIENTATION
ORIENTATION: RIGHT

## 2024-09-05 ASSESSMENT — PAIN DESCRIPTION - DESCRIPTORS
DESCRIPTORS: SHARP
DESCRIPTORS: ACHING

## 2024-09-05 ASSESSMENT — PAIN SCALES - GENERAL
PAINLEVEL_OUTOF10: 0
PAINLEVEL_OUTOF10: 0
PAINLEVEL_OUTOF10: 3
PAINLEVEL_OUTOF10: 0
PAINLEVEL_OUTOF10: 6
PAINLEVEL_OUTOF10: 0
PAINLEVEL_OUTOF10: 9

## 2024-09-05 ASSESSMENT — PAIN DESCRIPTION - LOCATION
LOCATION: KNEE

## 2024-09-05 NOTE — PROGRESS NOTES
Occupational Therapy Evaluation/Treatment Attempt    Chart reviewed. Attempted Occupational Therapy Evaluation/Treatment, however, patient unable to be seen due to:  []  Nausea/vomiting  []  Eating  []  Pain  []  Patient too lethargic  [x]  Off Unit for testing/procedure  []  Dialysis treatment in progress   []  Telemetry Results  []  WB status not in order set  []  Patient on bedrest per order set  []  Other :     Will follow up later as patient's schedule allows.   Thank you for this referral.  Melisa Jordan OTJED, OTR/L

## 2024-09-05 NOTE — ANESTHESIA POSTPROCEDURE EVALUATION
Post-Anesthesia Evaluation & Assessment    Vitals  BP: (!) 140/53  Temp: 97.9 °F (36.6 °C)  Temp Source: Temporal  Pulse: 81  Respirations: 15  SpO2: 95 %  Height: 165.1 cm (5' 5\")  Weight - Scale:  (70.6kg)  Pain Level: 0    Nausea/Vomiting: Controlled.    Post-operative hydration adequate.    Pain managed.    Mental status & Level of consciousness: alert and oriented x 3    Neurological status: moves all extremities, sensation grossly intact    Pulmonary status: airway patent, adequate oxygenation.    Complications related to anesthesia: none    Patient has met all PACU discharge requirements.      Feroz Ramirez, DO

## 2024-09-05 NOTE — PERIOP NOTE
TRANSFER - IN REPORT:    Verbal report received from CRNA and OR nurse on [facility]  being received from OR for routine progression of patient care      Report consisted of patient's Situation, Background, Assessment and   Recommendations(SBAR).     Information from the following report(s) Adult Overview, Surgery Report, Intake/Output, and MAR was reviewed with the receiving nurse.    Opportunity for questions and clarification was provided.      Assessment completed upon patient's arrival to unit and care assumed.

## 2024-09-05 NOTE — PROGRESS NOTES
9/5/2024  PT treatment not completed due to:  [x] Off Unit for testing/procedure  [] Pain  [] Eating  [] Patient too lethargic  [] Nausea/vomiting  [] Dialysis treatment in progress   [x] Other: pt of the unit for surgery this afternoon.  Will f/u as appropriate post operatively once new orders received.  Thank you.   YESSI Wilson, PT

## 2024-09-05 NOTE — INTERVAL H&P NOTE
Update History & Physical    The patient's History and Physical was reviewed with the patient and I examined the patient. There was indicated infection from previous procedure now with plans for explant and articulating spacer placement . The surgical site was confirmed by the patient and me.     Plan: The risks, benefits, expected outcome, and alternative to the recommended procedure have been discussed with the patient. Patient understands and wants to proceed with the procedure.     Electronically signed by David Jarrett DO on 9/5/2024 at 11:52 AM

## 2024-09-05 NOTE — ANESTHESIA PRE PROCEDURE
Department of Anesthesiology  Preprocedure Note       Name:  Lanny Elliott   Age:  79 y.o.  :  1945                                          MRN:  [id number]         Date:  2024      Surgeon: Surgeon(s):  David Jarrett DO    Procedure: Procedure(s):  REVISION RIGHT TOTAL KNEE- (ANTIBIOTIC SPACER) (PT IN ROOM #326)    Medications prior to admission:   Prior to Admission medications    Medication Sig Start Date End Date Taking? Authorizing Provider   oxyCODONE (ROXICODONE) 5 MG immediate release tablet Take 1 tablet by mouth every 6 hours as needed for Pain for up to 7 days. Max Daily Amount: 20 mg 24 Yes Cami Brown PA-C   sulindac (CLINORIL) 200 MG tablet Take 1 tablet by mouth 2 times daily   Yes Peterson Ortiz MD   acetaminophen (TYLENOL) 500 MG tablet Take 2 tablets by mouth every 6 hours as needed for Pain   Yes Peterson Ortiz MD   CRANBERRY PO Take 1 capsule by mouth daily   Yes Peterson Ortiz MD   Omega-3 Fatty Acids (FISH OIL PO) Take 1 capsule by mouth 2 times daily   Yes Peterson Ortiz MD   Multiple Vitamins-Minerals (THERAPEUTIC MULTIVITAMIN-MINERALS) tablet Take 1 tablet by mouth daily   Yes Peterson Ortiz MD   polycarbophil (FIBERCON) 625 MG tablet Take 1 tablet by mouth 2 times daily   Yes Peterson Ortiz MD   omeprazole (PRILOSEC) 20 MG delayed release capsule Take 1 capsule by mouth daily   Yes Peterson Ortiz MD       Current medications:    Current Facility-Administered Medications   Medication Dose Route Frequency Provider Last Rate Last Admin    lactated ringers IV soln infusion   IntraVENous Continuous David Jarrett DO        lisinopril (PRINIVIL;ZESTRIL) tablet 40 mg  40 mg Oral Daily Aye Jerry MD   40 mg at 24 1026    amLODIPine (NORVASC) tablet 5 mg  5 mg Oral Daily Aye Jerry MD   5 mg at 24 1017    hydroCHLOROthiazide (HYDRODIURIL) tablet 12.5 mg  12.5 mg Oral Daily Aye Jerry MD   12.5 mg at

## 2024-09-05 NOTE — PROGRESS NOTES
Hospitalist Progress Note    Patient: Lanny Elliott MRN: [id number]  CSN: [id number]    YOB: 1945  Age: 79 y.o.  Sex: female    DOA: 8/24/2024 LOS:  LOS: 12 days          Chief Complaint:  Lanny Elliott is a 79 y.o. female who has been seen for right knee pain and swelling and was transferred and admitted to ortho service for septic joint    Subjective:  I feel fine, pain is controlled and no diarrhea       Review of systems:  Constitutional: denies fevers, chills, myalgias  Respiratory: denies SOB, cough  Cardiovascular: denies chest pain, palpitations  Gastrointestinal: denies nausea, vomiting, diarrhea    10 systems reviewed, all negative other than what is mentioned above.      Vital signs/Intake and Output:  Visit Vitals  /63   Pulse 84   Temp 97.8 °F (36.6 °C) (Temporal)   Resp 20   Ht 1.651 m (5' 5\")   Wt 57.6 kg (127 lb)   SpO2 100%   BMI 21.13 kg/m²     Current Shift:  09/05 0701 - 09/05 1900  In: -   Out: 850 [Urine:850]  Last three shifts:  09/03 1901 - 09/05 0700  In: -   Out: 2150 [Urine:2150]    Exam:    General: Well developed, alert, NAD, OX3  Head/Neck: NCAT, supple, No masses, No lymphadenopathy  CVS:Regular rate and rhythm, no M/R/G, S1/S2 heard, no thrill  Lungs:Clear to auscultation bilaterally, no wheezes, rhonchi, or rales  Abdomen: Soft, Nontender, No distention, Normal Bowel sounds, No hepatomegaly  Extremities: No C/C/E, pulses palpable 2+ rt leg wrapped with ace bandage   Skin:normal texture and turgor, no rashes, no lesions  Neuro:grossly normal , follows commands  Psych:appropriate    Labs: Results:       Chemistry Recent Labs     09/04/24  0535 09/05/24  0457   GLUCOSE 120* 117*    137   K 4.5 4.8    101   CO2 27 30   BUN 8 10   CREATININE 0.52* 0.61   CALCIUM 9.9 10.1   BILITOT 0.5 0.4   AST 41* 27   * 83*   ALKPHOS 60 59   GLOB 2.6 2.6   LABGLOM >90 90      CBC w/Diff Recent Labs     09/03/24  0513 09/04/24  0535 09/05/24  0457   WBC

## 2024-09-05 NOTE — OP NOTE
OPERATIVE NOTE    Patient: Lanny Elliott MRN: [id number]  SSN: [id number]    YOB: 1945  Age: 79 y.o.  Sex: female      Indications: This is a 79 y.o. year-old female who presents with knee pain.  Previous history of partial replacements and development of worsening pain. With a arthroscopic washout that was positive for GNR. The patient was admitted for surgery as conservative measures have failed.    Date of Procedure: 8/24/2024 - 9/5/2024    Preoperative Diagnosis: Pre-Op Diagnosis Codes:      * Right knee pain, unspecified chronicity [M25.561]     * Infection of total right knee replacement, initial encounter ([facility]) [T84.53XA]    Postoperative Diagnosis: Post-Op Diagnosis Codes:     * Right knee pain, unspecified chronicity [M25.561]     * Infection of total right knee replacement, initial encounter ([facility]) [T84.53XA]    Procedure: Procedure(s):  REVISION RIGHT TOTAL KNEE- (ANTIBIOTIC SPACER) (PT IN ROOM #326)    Surgeons and Role:     * David Jarrett DO - Primary    Anesthesia: Gen    Surgeon: David Jarrett DO, and Surgical Assistant:  Cami Brown PA-C    Estimated Blood Loss: 150ml  Tt 300 mmhg 45 min    Specimens: * No specimens in log *     Implants:   Implant Name Type Inv. Item Serial No.  Lot No. LRB No. Used Action   GRAFT BNE SUB 10CC BEAD 25CC CA SULPHATE RAP SET W/ INDIV - [id number]  GRAFT BNE SUB 10CC BEAD 25CC CA SULPHATE RAP SET W/ INDIV  [employer] [facility] [place] [id number] Right 1 Implanted   CEMENT BNE 40GM FULL DOSE PMMA W/ GENT HI VISC RADPQ LNG - [id number]  CEMENT BNE 40GM FULL DOSE PMMA W/ GENT HI VISC RADPQ LNG  [facility] [employer] [employer] [id number] Right 2 Implanted   COMPONENT FEM SZ 5 R KNEE CRUCE RET MONICA ATTUNE - [id number]  COMPONENT FEM SZ 5 R KNEE CRUCE RET MONICA ATTUNE  [facility] [employer]- [id number] Right 1 Implanted   COMPONENT PAT [id number] KNEE POLY DOME MONICA MEDIALIZED ATTUNE - [id number]  COMPONENT PAT [id number] KNEE POLY DOME MONICA

## 2024-09-05 NOTE — PLAN OF CARE
Problem: Pain  Goal: Verbalizes/displays adequate comfort level or baseline comfort level  9/4/2024 2124 by Shalnoda Sabillon RN  Outcome: Progressing  9/4/2024 1620 by Danita White RN  Outcome: Progressing     Problem: Safety - Adult  Goal: Free from fall injury  9/4/2024 2124 by Shalonda Sabillon RN  Outcome: Progressing  9/4/2024 1620 by Danita White RN  Outcome: Progressing     Problem: Skin/Tissue Integrity  Goal: Absence of new skin breakdown  Description: 1.  Monitor for areas of redness and/or skin breakdown  2.  Assess vascular access sites hourly  3.  Every 4-6 hours minimum:  Change oxygen saturation probe site  4.  Every 4-6 hours:  If on nasal continuous positive airway pressure, respiratory therapy assess nares and determine need for appliance change or resting period.  9/4/2024 2124 by Shalonda Sabillon RN  Outcome: Progressing  9/4/2024 1620 by Danita White RN  Outcome: Progressing

## 2024-09-05 NOTE — PERIOP NOTE
TRANSFER - IN REPORT:    Verbal report received from SHABANA Christianson on [facility]  being received from  for ordered procedure      Report consisted of patient's Situation, Background, Assessment and   Recommendations(SBAR).     Information from the following report(s) Nurse Handoff Report was reviewed with the receiving nurse.    Opportunity for questions and clarification was provided.      Assessment completed upon patient's arrival to unit and care assumed.

## 2024-09-05 NOTE — BRIEF OP NOTE
Brief Postoperative Note      Patient: Lanny Elliott  YOB: 1945  MRN: [id number]    Date of Procedure: 9/5/2024    Pre-Op Diagnosis Codes:      * Right knee pain, unspecified chronicity [M25.561]     * Infection of total right knee replacement, initial encounter ([facility]) [T84.53XA]    Post-Op Diagnosis: Same       Procedure(s):  REVISION RIGHT TOTAL KNEE- (ANTIBIOTIC SPACER) (PT IN ROOM #326)    Surgeon(s):  David Jarrett DO    Assistant:  Surgical Assistant: Valery Pizarro  Physician Assistant: Cami Brown PA-C    Anesthesia: General    Estimated Blood Loss (mL): less than 100     Complications: None    Specimens:   * No specimens in log *    Implants:  Implant Name Type Inv. Item Serial No.  Lot No. LRB No. Used Action   GRAFT BNE SUB 10CC BEAD 25CC CA SULPHATE RAP SET W/ INDIV - [id number]  GRAFT BNE SUB 10CC BEAD 25CC CA SULPHATE RAP SET W/ INDIV  [employer] [id number] Right 1 Implanted   CEMENT BNE 40GM FULL DOSE PMMA W/ GENT HI VISC RADPQ LNG - [id number]  CEMENT BNE 40GM FULL DOSE PMMA W/ GENT HI VISC RADPQ LNG  [facility] [employer] [id number] Right 2 Implanted   COMPONENT FEM SZ 5 R KNEE CRUCE RET MONICA ATTUNE - [id number]  COMPONENT FEM SZ 5 R KNEE CRUCE RET MONICA ATTUNE  [facility] [employer] [id number] Right 1 Implanted   COMPONENT PAT [id number] KNEE POLY DOME MONICA MEDIALIZED ATTUNE - [id number]  COMPONENT PAT [id number] KNEE POLY DOME MONICA MEDIALIZED ATTUNE  [facility] [employer] [id number] Right 1 Implanted   COMPONENT TIB TY CR 4 10 MM KNEE FIX BEAR POLYETH AOX ATTUNE - [id number]  COMPONENT TIB TY CR 4 10 MM KNEE FIX BEAR POLYETH AOX ATTUNE  [facility] [employer] [facility] [id number] Right 1 Implanted         Drains:   External Urinary Catheter (Active)   Site Assessment Clean,dry & intact 09/04/24 2000   Placement Replaced 09/03/24 0839   Catheter Care Catheter/Wick replaced 09/04/24 0504   Perineal Care Yes 09/03/24 2000   Suction

## 2024-09-05 NOTE — PROGRESS NOTES
Bedside shift change report given to gabe betancourt (oncoming nurse) by Collette Pastor RN   (offgoing nurse). Report included the following information Nurse Handoff Report, Index, Adult Overview, Surgery Report, Intake/Output, MAR, and Recent Results.

## 2024-09-05 NOTE — PROGRESS NOTES
0730  Bedside and Verbal shift change report given to Collette Welsh RN & Sammy Kelly RN (oncoming nurse) by Shalonda Sabillon RN (offgoing nurse). Report included the following information Nurse Handoff Report, Adult Overview, Surgery Report, Intake/Output, MAR, and Recent Results.

## 2024-09-05 NOTE — PROGRESS NOTES
Progress Note      Patient: Lanny Elliott               Sex: female          DOA: 8/24/2024       YOB: 1945      Age:  79 y.o.        LOS:  LOS: 12 days         Subjective:     No new ortho changes. Pending surgery this afternoon.    Objective:      Visit Vitals  BP (!) 132/52   Pulse 88   Temp 97.8 °F (36.6 °C) (Temporal)   Resp 20   Ht 1.651 m (5' 5\")   Wt 57.6 kg (127 lb)   SpO2 98%   BMI 21.13 kg/m²       Physical Exam:   Dressing:  clean, dry, intact   Wiggles Toes/Ankle  Foot sensation intact to light touch  No foot edema/ +1 Posterior Tibial Pulse    Intake and Output:  Current Shift:  No intake/output data recorded.  Last three shifts:  09/03 1901 - 09/05 0700  In: -   Out: 2150 [Urine:2150]  Voiding Status:  +void without need for danielle catheter    Lab/Data Reviewed:  Recent Labs     09/05/24  0457   HGB 11.2*   HCT 34.0*      K 4.8      CO2 30   BUN 10       Medications Reviewed    Assessment/Plan     Principal Problem:    Septic joint (HCC)  Active Problems:    Temporal arteritis (HCC)    Hypokalemia    HTN (hypertension)    Constipation  Resolved Problems:    * No resolved hospital problems. *      Infected Right knee - surgery this afternoon with Dr. Jarrett  Revision RTKA with explant of hardware and implant of antibiotic spacer  Continue antibiotics per ID  OOB and WBAT with PT              The patient's plan of care was discussed with Dr. Jarrett today as well and he is in agreement with above.

## 2024-09-05 NOTE — PROGRESS NOTES
2000  Patient in bed awake, A/O x4, speech clear, hearing intact, patient can weight bare on right leg as tolerated but has only gotten up with PT, continent of urine and stool. Patient's last bowel movement was 9/1. Patient was educated about taking her stool softeners due to possible constipation while being administered the Roxicodone. Patient has new pure wick in place set to wall suction. On room air, lung sounds clear, respirations unlabored. IV dressings clean, dry, and intact. Dressing on knee include steri strips, abd, and then ace wrapped. Dressing on right knee is currently clean, dry, and intact. Patient has robert hose and SCDs in place. Radial and pedal pulses present bilaterally, capillary refill <3 seconds to fingers and toes. Abdomen soft, bowel sounds active. Moderate hand  noted to bilateral upper extremities. Side rails x3 up, bed locked and in lowest position, bed alarm on, call bell and bedside table within reach, needs attended, denies any SOB, or concerns at present. Patient rated pain 8/10 on her right ankle. Patient was given ice, elevated, and administered 10mg of Roxicodone for pain. Patient denied Tylenol and states it \"only helps when I have headaches.\"    Patient was educated about NPO at 0000 and CHG bath/gown change that needed to be completed.     2350  Patient CHG bath was performed. Patient placed on NPO and all food/drink removed from bedside.

## 2024-09-05 NOTE — PERIOP NOTE
TRANSFER - OUT REPORT:    Verbal report given to Mango Christianson on Lanny Elliott  being transferred to [facility] for routine progression of patient care       Report consisted of patient's Situation, Background, Assessment and   Recommendations(SBAR).     Information from the following report(s) Adult Overview, Surgery Report, Intake/Output, and MAR was reviewed with the receiving nurse.           Lines:   Peripheral IV 08/24/24 Left Antecubital (Active)   Site Assessment Intact;Dry 09/05/24 1432   Line Status Infusing 09/05/24 1432   Line Care Connections checked and tightened 09/05/24 1432   Phlebitis Assessment No symptoms 09/05/24 1432   Infiltration Assessment 0 09/05/24 1432   Alcohol Cap Used Yes 09/04/24 2000   Dressing Status Old drainage noted 09/05/24 1432   Dressing Type Transparent 09/05/24 1432   Dressing Intervention New 08/30/24 1845        Opportunity for questions and clarification was provided.      Patient transported with:  Registered Nurse

## 2024-09-05 NOTE — ANESTHESIA PROCEDURE NOTES
Peripheral Block    Patient location during procedure: pre-op  Reason for block: post-op pain management and at surgeon's request  Start time: 9/5/2024 12:10 PM  End time: 9/5/2024 12:14 PM  Staffing  Performed: anesthesiologist   Anesthesiologist: Feroz Ramirez DO  Performed by: Feroz Ramirez DO  Authorized by: Feroz Ramirez DO    Preanesthetic Checklist  Completed: patient identified, IV checked, site marked, risks and benefits discussed, surgical/procedural consents, equipment checked, pre-op evaluation, timeout performed, anesthesia consent given, oxygen available, monitors applied/VS acknowledged, fire risk safety assessment completed and verbalized and blood product R/B/A discussed and consented  Peripheral Block   Patient position: supine (frog leg)  Prep: ChloraPrep  Provider prep: mask and sterile gloves  Patient monitoring: cardiac monitor, continuous pulse ox, frequent blood pressure checks, IV access, oxygen and responsive to questions  Block type: Femoral  Adductor canal  Laterality: right  Injection technique: single-shot  Guidance: ultrasound guided    Needle   Needle type: insulated echogenic nerve stimulator needle   Needle gauge: 21 G  Needle localization: ultrasound guidance  Needle length: 8 cm  Assessment   Injection assessment: negative aspiration for heme, no paresthesia on injection, local visualized surrounding nerve on ultrasound and no intravascular symptoms  Paresthesia pain: none  Slow fractionated injection: yes  Hemodynamics: stable  Outcomes: uncomplicated and patient tolerated procedure well

## 2024-09-06 ENCOUNTER — APPOINTMENT (OUTPATIENT)
Facility: HOSPITAL | Age: 79
DRG: 467 | End: 2024-09-06
Attending: ORTHOPAEDIC SURGERY
Payer: MEDICARE

## 2024-09-06 PROBLEM — R79.89 ELEVATED LFTS: Status: ACTIVE | Noted: 2024-09-06

## 2024-09-06 LAB
ALBUMIN SERPL-MCNC: 3 G/DL (ref 3.4–5)
ALBUMIN/GLOB SERPL: 1.3 (ref 0.8–1.7)
ALP SERPL-CCNC: 53 U/L (ref 45–117)
ALT SERPL-CCNC: 67 U/L (ref 13–56)
ANION GAP SERPL CALC-SCNC: 6 MMOL/L (ref 3–18)
AST SERPL-CCNC: 27 U/L (ref 10–38)
BILIRUB DIRECT SERPL-MCNC: 0.2 MG/DL (ref 0–0.2)
BILIRUB SERPL-MCNC: 0.5 MG/DL (ref 0.2–1)
BUN SERPL-MCNC: 10 MG/DL (ref 7–18)
BUN/CREAT SERPL: 20 (ref 12–20)
CALCIUM SERPL-MCNC: 9.5 MG/DL (ref 8.5–10.1)
CHLORIDE SERPL-SCNC: 103 MMOL/L (ref 100–111)
CO2 SERPL-SCNC: 28 MMOL/L (ref 21–32)
CREAT SERPL-MCNC: 0.51 MG/DL (ref 0.6–1.3)
ERYTHROCYTE [DISTWIDTH] IN BLOOD BY AUTOMATED COUNT: 15.1 % (ref 11.6–14.5)
GLOBULIN SER CALC-MCNC: 2.3 G/DL (ref 2–4)
GLUCOSE SERPL-MCNC: 110 MG/DL (ref 74–99)
HBV CORE AB SERPL QL IA: NEGATIVE
HBV SURFACE AB SER QL IA: NEGATIVE
HBV SURFACE AB SERPL IA-ACNC: <3.1 MIU/ML
HCT VFR BLD AUTO: 26.7 % (ref 35–45)
HCV AB SER IA-ACNC: <0.02 INDEX
HCV AB SERPL QL IA: NEGATIVE
HEP BS AB COMMENT: ABNORMAL
HEPATITIS C COMMENT: NORMAL
HGB BLD-MCNC: 9.1 G/DL (ref 12–16)
MCH RBC QN AUTO: 31.4 PG (ref 24–34)
MCHC RBC AUTO-ENTMCNC: 34.1 G/DL (ref 31–37)
MCV RBC AUTO: 92.1 FL (ref 78–100)
NRBC # BLD: 0 K/UL (ref 0–0.01)
NRBC BLD-RTO: 0 PER 100 WBC
PLATELET # BLD AUTO: 349 K/UL (ref 135–420)
PMV BLD AUTO: 8.9 FL (ref 9.2–11.8)
POTASSIUM SERPL-SCNC: 4.5 MMOL/L (ref 3.5–5.5)
PROT SERPL-MCNC: 5.3 G/DL (ref 6.4–8.2)
RBC # BLD AUTO: 2.9 M/UL (ref 4.2–5.3)
SODIUM SERPL-SCNC: 137 MMOL/L (ref 136–145)
WBC # BLD AUTO: 19.7 K/UL (ref 4.6–13.2)

## 2024-09-06 PROCEDURE — 6370000000 HC RX 637 (ALT 250 FOR IP): Performed by: INTERNAL MEDICINE

## 2024-09-06 PROCEDURE — 2709999900 IR PICC WO SQ PORT/PUMP > 5 YEARS

## 2024-09-06 PROCEDURE — 1100000000 HC RM PRIVATE

## 2024-09-06 PROCEDURE — 6360000002 HC RX W HCPCS: Performed by: INTERNAL MEDICINE

## 2024-09-06 PROCEDURE — 6360000002 HC RX W HCPCS: Performed by: ORTHOPAEDIC SURGERY

## 2024-09-06 PROCEDURE — 80076 HEPATIC FUNCTION PANEL: CPT

## 2024-09-06 PROCEDURE — 97116 GAIT TRAINING THERAPY: CPT

## 2024-09-06 PROCEDURE — 36415 COLL VENOUS BLD VENIPUNCTURE: CPT

## 2024-09-06 PROCEDURE — 80048 BASIC METABOLIC PNL TOTAL CA: CPT

## 2024-09-06 PROCEDURE — 2580000003 HC RX 258: Performed by: INTERNAL MEDICINE

## 2024-09-06 PROCEDURE — 6370000000 HC RX 637 (ALT 250 FOR IP): Performed by: HOSPITALIST

## 2024-09-06 PROCEDURE — 2580000003 HC RX 258: Performed by: PHYSICIAN ASSISTANT

## 2024-09-06 PROCEDURE — 85027 COMPLETE CBC AUTOMATED: CPT

## 2024-09-06 PROCEDURE — 6360000002 HC RX W HCPCS: Performed by: PHYSICIAN ASSISTANT

## 2024-09-06 PROCEDURE — 2580000003 HC RX 258: Performed by: ORTHOPAEDIC SURGERY

## 2024-09-06 PROCEDURE — 6370000000 HC RX 637 (ALT 250 FOR IP): Performed by: ORTHOPAEDIC SURGERY

## 2024-09-06 PROCEDURE — 74181 MRI ABDOMEN W/O CONTRAST: CPT

## 2024-09-06 PROCEDURE — 6370000000 HC RX 637 (ALT 250 FOR IP): Performed by: PHYSICIAN ASSISTANT

## 2024-09-06 PROCEDURE — 97168 OT RE-EVAL EST PLAN CARE: CPT

## 2024-09-06 PROCEDURE — 97164 PT RE-EVAL EST PLAN CARE: CPT

## 2024-09-06 PROCEDURE — 2500000003 HC RX 250 WO HCPCS: Performed by: INTERNAL MEDICINE

## 2024-09-06 RX ORDER — PSEUDOEPHEDRINE HCL 30 MG
100 TABLET ORAL 2 TIMES DAILY PRN
Qty: 6 CAPSULE | Refills: 0 | Status: SHIPPED | OUTPATIENT
Start: 2024-09-06 | End: 2024-09-09

## 2024-09-06 RX ORDER — PREDNISONE 1 MG/1
4 TABLET ORAL DAILY
Qty: 40 TABLET | Refills: 0 | Status: SHIPPED | OUTPATIENT
Start: 2024-09-07 | End: 2024-09-17

## 2024-09-06 RX ORDER — HEPARIN SODIUM 200 [USP'U]/100ML
1000 INJECTION, SOLUTION INTRAVENOUS ONCE
Status: COMPLETED | OUTPATIENT
Start: 2024-09-06 | End: 2024-09-06

## 2024-09-06 RX ORDER — OXYCODONE HYDROCHLORIDE 5 MG/1
5-10 TABLET ORAL EVERY 6 HOURS PRN
Qty: 42 TABLET | Refills: 0 | Status: SHIPPED | OUTPATIENT
Start: 2024-09-06 | End: 2024-09-13

## 2024-09-06 RX ORDER — CELECOXIB 200 MG/1
200 CAPSULE ORAL 2 TIMES DAILY
Qty: 30 CAPSULE | Refills: 0 | Status: SHIPPED | OUTPATIENT
Start: 2024-09-06 | End: 2024-09-21

## 2024-09-06 RX ORDER — ENOXAPARIN SODIUM 100 MG/ML
40 INJECTION SUBCUTANEOUS DAILY
Qty: 8.4 ML | Refills: 0 | Status: SHIPPED | OUTPATIENT
Start: 2024-09-07 | End: 2024-09-28

## 2024-09-06 RX ORDER — HEPARIN 100 UNIT/ML
300 SYRINGE INTRAVENOUS EVERY 8 HOURS PRN
Status: DISCONTINUED | OUTPATIENT
Start: 2024-09-06 | End: 2024-09-07 | Stop reason: HOSPADM

## 2024-09-06 RX ADMIN — Medication 625 MG: at 21:03

## 2024-09-06 RX ADMIN — LISINOPRIL 40 MG: 20 TABLET ORAL at 08:11

## 2024-09-06 RX ADMIN — SODIUM CHLORIDE, SODIUM LACTATE, POTASSIUM CHLORIDE, AND CALCIUM CHLORIDE: 600; 310; 30; 20 INJECTION, SOLUTION INTRAVENOUS at 04:13

## 2024-09-06 RX ADMIN — Medication 625 MG: at 08:24

## 2024-09-06 RX ADMIN — LIDOCAINE HYDROCHLORIDE 3 ML: 10 INJECTION, SOLUTION INFILTRATION; PERINEURAL at 10:44

## 2024-09-06 RX ADMIN — WATER 1000 MG: 1 INJECTION INTRAMUSCULAR; INTRAVENOUS; SUBCUTANEOUS at 15:27

## 2024-09-06 RX ADMIN — SODIUM CHLORIDE, PRESERVATIVE FREE 10 ML: 5 INJECTION INTRAVENOUS at 21:17

## 2024-09-06 RX ADMIN — ONDANSETRON 4 MG: 2 INJECTION INTRAMUSCULAR; INTRAVENOUS at 11:19

## 2024-09-06 RX ADMIN — POLYETHYLENE GLYCOL 3350 17 G: 17 POWDER, FOR SOLUTION ORAL at 08:10

## 2024-09-06 RX ADMIN — ACETAMINOPHEN 650 MG: 325 TABLET ORAL at 08:12

## 2024-09-06 RX ADMIN — Medication 1 TABLET: at 08:15

## 2024-09-06 RX ADMIN — Medication 300 UNITS: at 10:44

## 2024-09-06 RX ADMIN — PANTOPRAZOLE SODIUM 40 MG: 40 TABLET, DELAYED RELEASE ORAL at 06:25

## 2024-09-06 RX ADMIN — SODIUM CHLORIDE, PRESERVATIVE FREE 10 ML: 5 INJECTION INTRAVENOUS at 08:16

## 2024-09-06 RX ADMIN — AMLODIPINE BESYLATE 5 MG: 5 TABLET ORAL at 08:15

## 2024-09-06 RX ADMIN — ENOXAPARIN SODIUM 40 MG: 100 INJECTION SUBCUTANEOUS at 08:12

## 2024-09-06 RX ADMIN — OXYCODONE HYDROCHLORIDE 10 MG: 5 TABLET ORAL at 15:25

## 2024-09-06 RX ADMIN — OXYCODONE HYDROCHLORIDE 5 MG: 5 TABLET ORAL at 11:16

## 2024-09-06 RX ADMIN — SODIUM CHLORIDE, PRESERVATIVE FREE 10 ML: 5 INJECTION INTRAVENOUS at 21:16

## 2024-09-06 RX ADMIN — DOCUSATE SODIUM 100 MG: 100 CAPSULE, LIQUID FILLED ORAL at 08:12

## 2024-09-06 RX ADMIN — HYDROCHLOROTHIAZIDE 12.5 MG: 25 TABLET ORAL at 08:15

## 2024-09-06 RX ADMIN — ACETAMINOPHEN 650 MG: 325 TABLET ORAL at 02:07

## 2024-09-06 RX ADMIN — OXYCODONE HYDROCHLORIDE 10 MG: 5 TABLET ORAL at 21:03

## 2024-09-06 RX ADMIN — PREDNISONE 4 MG: 1 TABLET ORAL at 08:11

## 2024-09-06 RX ADMIN — HEPARIN SODIUM 1000 UNITS: 200 INJECTION, SOLUTION INTRAVENOUS at 10:44

## 2024-09-06 RX ADMIN — SODIUM CHLORIDE, SODIUM LACTATE, POTASSIUM CHLORIDE, AND CALCIUM CHLORIDE: 600; 310; 30; 20 INJECTION, SOLUTION INTRAVENOUS at 11:25

## 2024-09-06 RX ADMIN — MELOXICAM 15 MG: 7.5 TABLET ORAL at 08:13

## 2024-09-06 ASSESSMENT — PAIN SCALES - GENERAL
PAINLEVEL_OUTOF10: 1
PAINLEVEL_OUTOF10: 6
PAINLEVEL_OUTOF10: 7
PAINLEVEL_OUTOF10: 8

## 2024-09-06 ASSESSMENT — PAIN DESCRIPTION - DESCRIPTORS
DESCRIPTORS: ACHING

## 2024-09-06 ASSESSMENT — PAIN DESCRIPTION - LOCATION
LOCATION: KNEE

## 2024-09-06 ASSESSMENT — PAIN DESCRIPTION - ORIENTATION
ORIENTATION: RIGHT

## 2024-09-06 NOTE — PROGRESS NOTES
Bedside and Verbal shift change report given to SHABANA Kamara (oncoming nurse) by SHABANA Lemus (offgoing nurse). Report included the following information Nurse Handoff Report, Adult Overview, Intake/Output, MAR, Recent Results, and Med Rec Status.

## 2024-09-06 NOTE — PLAN OF CARE
Problem: Pain  Goal: Verbalizes/displays adequate comfort level or baseline comfort level  9/6/2024 0314 by Mariah Fuchs RN  Outcome: Progressing  9/5/2024 1552 by Collette Sim RN  Outcome: Progressing     Problem: Safety - Adult  Goal: Free from fall injury  9/6/2024 0314 by Mariah Fuchs RN  Outcome: Progressing  9/5/2024 1552 by Collette Sim RN  Outcome: Progressing     Problem: Skin/Tissue Integrity  Goal: Absence of new skin breakdown  Description: 1.  Monitor for areas of redness and/or skin breakdown  2.  Assess vascular access sites hourly  3.  Every 4-6 hours minimum:  Change oxygen saturation probe site  4.  Every 4-6 hours:  If on nasal continuous positive airway pressure, respiratory therapy assess nares and determine need for appliance change or resting period.  9/6/2024 0314 by Mariah Fuchs RN  Outcome: Progressing  9/5/2024 1552 by Collette Sim RN  Outcome: Progressing

## 2024-09-06 NOTE — CARE COORDINATION
Pt accepted at [place] [facility] per Mable [phone] in admissions pt can admit today and receive her IV abx. SW uploaded ID notes to [facility] and PICC line has been placed. Per the attending MD, pt is to receive a MRCP and is likely to discharge on Saturday.    Post op PT/OT orders in place.    ADI informed Mable who reported pt is able to admit over the weekend and the  would be Mansi [phone].       [facility]    Services Available   Home Rehabilitation, Inpatient Rehabilitation, Outpatient Rehabilitation, Skilled Nursing      Address   [facility] [address]             Contact Information    [phone]          Pt and spouse ANGELO [phone] are in agreement with the plan.     Pt will need BLS transport.    RASHID David  Case Management Department

## 2024-09-06 NOTE — PROGRESS NOTES
Occupational Therapy Goals:  Initiated 9/6/2024 to be met within 7-10 days.  Short Term Goals  Time Frame for Short Term Goals: 7 days  Short Term Goal 1: Patient will complete toileting with SBA  Short Term Goal 2: Patient will complete LBD with CGA and AE PRN.  Short Term Goal 3: Patient will complete grooming in sitting at EOB with setup A  Short Term Goal 4: Patient will complete sponge bathing with CGA.  Short Term Goal 5: Patient will complete bed to chair/bsc transfer with CGA    OCCUPATIONAL THERAPY RE-EVALUATION    Patient: Lanny Elliott (79 y.o. female)  Date: 9/6/2024  Primary Diagnosis: Septic joint (HCC) [M00.9]  Procedure(s) (LRB):  REVISION RIGHT TOTAL KNEE- (ANTIBIOTIC SPACER) (PT IN ROOM #326) (Right) 1 Day Post-Op   Precautions: Fall Risk, Weight Bearing, Right Lower Extremity Weight Bearing: Weight Bearing As Tolerated,  ,  ,  ,  ,  ,    PLOF: Patient completed ADLs independently    ASSESSMENT :  Patient presented seated EOB with Physical Therapy, reports they had stood and taken steps along bed. Patient with no visitors present for entire session. Patient completed assessment of ADLs and BUE strength, ROM (see details below). Patient provided adaptive equipment for lower body dressing/bathing including reacher, sock aid, long handled shoe horn, and long handled sponge. Patient educated on two items before reporting she needs to lay down. Patient educated on use of reacher to thread underwear/pants, , and to doff socks, completed with standby assist. Patient educated on use of sock aid to don socks, completed with standby assist.  Due to deficits (listed below) patient has decreased independence with ADLs and functional mobility and would benefit from would benefit from continued occupational therapy services.  ,      DEFICITS/IMPAIRMENTS:  Performance deficits / Impairments: Decreased functional mobility ;Decreased high-level IADLs;Decreased ADL status;Decreased endurance;Decreased  Therapy;Transfer Training;Plan of Care;Fall Prevention Strategies;Equipment;Precautions;ADL Adaptive Strategies  Education Method: Verbal  Barriers to Learning: None  Education Outcome: Verbalized understanding;Demonstrated understanding    Thank you for this referral.  GEORGETTE Bennett, OTR/L  Minutes: 11    Eval Complexity: Decision Making: Medium Complexity

## 2024-09-06 NOTE — PROGRESS NOTES
Hospitalist Progress Note    Patient: Lanny Elliott MRN: [id number]  CSN: [id number]    YOB: 1945  Age: 79 y.o.  Sex: female    DOA: 8/24/2024 LOS:  LOS: 13 days          Chief Complaint:  Lanny Elliott is a 79 y.o. female who has been seen for right knee pain and swelling and was transferred and admitted to ortho service for septic joint    Subjective:  Pain is controlled tylenol not helping     Review of systems:  Constitutional: denies fevers, chills, myalgias  Respiratory: denies SOB, cough  Cardiovascular: denies chest pain, palpitations  Gastrointestinal: denies nausea, vomiting, diarrhea    10 systems reviewed, all negative other than what is mentioned above.      Vital signs/Intake and Output:  Visit Vitals  BP (!) 139/45   Pulse 97   Temp 98.2 °F (36.8 °C) (Oral)   Resp 18   Ht 1.651 m (5' 5\")   Wt 57.6 kg (127 lb)   SpO2 97%   BMI 21.13 kg/m²     Current Shift:  09/06 0701 - 09/06 1900  In: -   Out: 400 [Urine:400]  Last three shifts:  09/04 1901 - 09/06 0700  In: 2600 [I.V.:2600]  Out: 3800 [Urine:3800]    Exam:    General: Well developed, alert, NAD, OX3  Head/Neck: NCAT, supple, No masses, No lymphadenopathy  CVS:Regular rate and rhythm, no M/R/G, S1/S2 heard, no thrill  Lungs:Clear to auscultation bilaterally, no wheezes, rhonchi, or rales  Abdomen: Soft, Nontender, No distention, Normal Bowel sounds, No hepatomegaly  Extremities: No C/C/E, pulses palpable 2+ rt leg wrapped with ace bandage   Skin:normal texture and turgor, no rashes, no lesions  Neuro:grossly normal , follows commands  Psych:appropriate    Labs: Results:       Chemistry Recent Labs     09/04/24  0535 09/05/24  0457 09/06/24  0316   GLUCOSE 120* 117* 110*    137 137   K 4.5 4.8 4.5    101 103   CO2 27 30 28   BUN 8 10 10   CREATININE 0.52* 0.61 0.51*   CALCIUM 9.9 10.1 9.5   BILITOT 0.5 0.4 0.5   AST 41* 27 27   * 83* 67*   ALKPHOS 60 59 53   GLOB 2.6 2.6 2.3   LABGLOM >90 90 >90      CBC

## 2024-09-06 NOTE — CARE COORDINATION
[facility]          Services Available   Home Rehabilitation, Inpatient Rehabilitation, Outpatient Rehabilitation, Skilled Nursing      Address   [address] [address]             Contact Information    [phone]          RN to call report to [phone]    Pt will admit to the [facility] in room 920.    Pt and spouse in agreement with the plan.    [facility] [phone] scheduled for 9:30pm the earliest howwever, Per Mable in admissions, that is too late. In result pt will admit to the SNF tomorrow at 9:30am [facility].     RN and pt made aware of transport at 9:30am. Facility is 1hr and 45min away.     DC packet in chart pending dc summary, please upload to careport once completed.    RASHID David  Case Management Department

## 2024-09-06 NOTE — PROGRESS NOTES
Physical Therapy Goals:  Initiated 9/6/2024 to be met within 7-10 days.  Short Term Goals  Short Term Goal 1: Patient will perform supine to/from sit with SBA  Short Term Goal 2: Patient will perform sit to/from stand with SB-CGA in preperation for gait progression  Short Term Goal 3: Patient will ambulate 100 ft with RW and SB-CGA to progress OOB mobility  Short Term Goal 4: Patient will negotiate 3 stairs with handrails and CGA to simulate home entry    PHYSICAL THERAPY RE-EVALUATION    Patient: Lanny Elliott (79 y.o. female)  Date: 9/6/2024  Primary Diagnosis: Septic joint (HCC) [M00.9]  Procedure(s) (LRB):  REVISION RIGHT TOTAL KNEE- (ANTIBIOTIC SPACER) (PT IN ROOM #326) (Right) 1 Day Post-Op   Precautions: Fall Risk, Weight Bearing, Right Lower Extremity Weight Bearing: Weight Bearing As Tolerated  PLOF: Independent ambulation without AD    ASSESSMENT AND RECOMMENDATIONS:  Patient seen following R TKA revision and placement of spacer yesterday and is WBAT. Patient continues to keep pillow under R knee (removed upon entering, bed locked and education provided). Patient also continues to keep LE positioned in hip external rotation/flexion, knee flexion, and ankle plantarflexion. Provided stretching/PROM with pain reported. Patient transitioned to EOB with CGA. Patient performed stand transfer with ModA for lift and balance. Patient ambulated 3 lateral ft with RW and Yayo. Patient limited by endurance and pain tolerance. Patient performed seated there ex for LE strengthening/flexibility. Will continue to progress mobility as tolerated.    DEFICITS/IMPAIRMENTS:   Body Structures, Functions, Activity Limitations Requiring Skilled Therapeutic Intervention: Decreased functional mobility ;Decreased ROM;Decreased strength;Decreased sensation;Decreased balance;Increased pain;Decreased endurance    Patient will continue to benefit from skilled intervention to address the above impairments.  Patient's rehabilitation  and based on an AM-PAC score, no further PT is recommended upon discharge due to (i.e. patient at baseline functional status…etc…).  Recommend patient returns to prior setting with prior services.     SUBJECTIVE:   Patient stated “It really hurts.”    OBJECTIVE DATA SUMMARY:     Past Medical History:   Diagnosis Date    Acid reflux disease     Arthritis     Temporal arteritis (HCC) 2024    Venous insufficiency     noted in CE     Past Surgical History:   Procedure Laterality Date    BLADDER REPAIR  2017    lift    HYSTERECTOMY VAGINAL  1986    partial    JOINT REPLACEMENT Right 2004    index and middle finger    KNEE ARTHROSCOPY Right 8/27/2024    RIGHT KNEE ARTHROSCOPIC WASHOUT (PT IN ROOM #326) performed by David Jarrett DO at [facility] MAIN OR    PARTIAL KNEE ARTHROPLASTY Bilateral     ROTATOR CUFF REPAIR Bilateral      Barriers to Learning/Limitations: none  Compensate with: visual, verbal, tactile, kinesthetic cues/model  Home Situation:  Social/Functional History  Lives With: Spouse  Type of Home: House  Home Layout: Two level  Home Access: Stairs to enter with rails  Entrance Stairs - Number of Steps: 5 steps  Entrance Stairs - Rails: Both  Bathroom Shower/Tub: Walk-in shower  Bathroom Toilet: Bedside commode  Bathroom Equipment: Grab bars in shower  Home Equipment: Cane, Walker - Standard  ADL Assistance: Independent  Homemaking Assistance: Independent  Ambulation Assistance: Independent  Transfer Assistance: Independent  Active : No  Patient's  Info:  drives  Mode of Transportation: Car  Occupation: Retired  Critical Behavior:   WNL  Strength:    Strength: Generally decreased, functional  Tone & Sensation:   Tone: Normal  Sensation: Intact  Range Of Motion:  AROM: Generally decreased, functional  PROM: Generally decreased, functional  Functional Mobility:  Bed Mobility:   Bed Mobility Training  Bed Mobility Training: Yes  Interventions: Safety awareness training;Verbal cues  Supine to

## 2024-09-06 NOTE — PROGRESS NOTES
[facility]  (A Division of [facility])                                                           Date of Admission: 8/24/2024       Reason for Consult: Possible Right knee infection  Referring MD: Dr Suarez    C/C: right knee pain/swelling    Current Antimicrobials:    Prior Antimicrobials:    Ceftriaxone 2 gm IV daily 8/30 to date   Zosyn IV  8/25 to 27  Vanco 8/25 to 8/30  Cefepime 8/29 to 8/30     Allergy to antibiotics: NA       Assessment--ID related:     Immunosuppressed patient, due to Tociluzumab treatment with:    Post op Leucocytosis- 19K-- due to surgical trauama/bleeding etc       S/P R knee revision, ABX spacer placement with vanco/rifampin-- No surgical specimen for lab-- 9/5/25            Possible R knee TKA infection? Late--Seedling? E.coli- pan sensitive from OR culture    S/P OR 8/27--arthroscopic debridment and washout--gross synovitis, large and displaced lateral meniscal tear with no finding of purulence or gross infection per op note  --BL partial TKA >20 years ago  --Right partial TKA- pain from 03/2023  --increasing swelling, unable to walk from last week.   --denies prior po abx, prior injection to her knee  --joint tap 05/2024-- WBC bloody- 3375- 91% N, no growth. Outside lab PCR in process  --ESR 1, CRP 0.9--8/25/24  --ESR 1  , CRP <0.3--9/5/24    Abn LFT- ALT/AST trenging down, Total bili/ AP- normal-- declining. Etiology unclear-- US shows GB distension with no cholecystitis sign. LFT improving spontaneously    Microlab data:    8/7-MRSA screening-negative       --urine culture negative  8/25- Blood culture X2- NG           UA-no pyuria  8/27-- OR culture X2-- GNR 1/2 specimen, E.coli-- panSS-- prelim, AF/Fungal stain and cultures NGTD         -- broad bacterial PCR sent out-result --in process       Co-morbidities:    Post op Anemia  TA on immunosuppression-  treatment X1 year. Tociluzumab on hold and steroid tapered  biopsy and on treatment with immunosuppression X1 year.     She had progressively worsening right knee joint pain since March. Evaluated in [place] and advised will need TKA and was scheduled for Sept by Dr Jarrett office. Pain worsened acutely last week, and she came in to be admitted on 8/24/24. There is CT done in [place] showing progressive osteolysis of prosthetic joint.    On exam-- afebrile, main complaint of right knee- anterior/posterior pain and right foot pain. No sob. No prior abx. No prior knee injection.     Denies previous severe infection.     Retired from working in phone company. Travel to [employer] in past 2-3 years.       Past Medical History:   Diagnosis Date    Acid reflux disease     Arthritis     Temporal arteritis (HCC) 2024    Venous insufficiency     noted in CE     Past Surgical History:   Procedure Laterality Date    BLADDER REPAIR  2017    lift    HYSTERECTOMY VAGINAL  1986    partial    JOINT REPLACEMENT Right 2004    index and middle finger    KNEE ARTHROSCOPY Right 8/27/2024    RIGHT KNEE ARTHROSCOPIC WASHOUT (PT IN ROOM #326) performed by David Jarrett DO at [facility] MAIN OR    PARTIAL KNEE ARTHROPLASTY Bilateral     ROTATOR CUFF REPAIR Bilateral      History reviewed. No pertinent family history.      Medications:  Current Facility-Administered Medications   Medication Dose Route Frequency Provider Last Rate Last Admin    lactated ringers IV soln infusion   IntraVENous Continuous David Jarrett  mL/hr at 09/06/24 0413 New Bag at 09/06/24 0413    sodium chloride flush 0.9 % injection 5-40 mL  5-40 mL IntraVENous 2 times per day Cami Brown PA-C   10 mL at 09/05/24 2112    sodium chloride flush 0.9 % injection 5-40 mL  5-40 mL IntraVENous PRN Cami Brown PA-C        0.9 % sodium chloride infusion   IntraVENous PRN Cami Brown PA-C        acetaminophen (TYLENOL) tablet 650 mg  650 mg Oral Q6H Cami Brown PA-C   650 mg at 09/06/24 0207    lisinopril

## 2024-09-06 NOTE — PROGRESS NOTES
Progress Note      Patient: Lanny Elliott               Sex: female          DOA: 8/24/2024     YOB: 1945      Age:  79 y.o.        LOS:  LOS: 13 days     Status Post: Procedure(s):  REVISION RIGHT TOTAL KNEE- (ANTIBIOTIC SPACER) (PT IN ROOM #326)  Surgery Date: 09/05/2024         Subjective:     Lanny Elliott is a 79 y.o. female who c/o right knee pain reasonably well controlled with prn pain meds. Patient denies any complaint of calf pain/ SOB or difficulty breathing.    Objective:      Visit Vitals  BP (!) 127/58   Pulse 86   Temp 98.4 °F (36.9 °C) (Oral)   Resp 18   Ht 1.651 m (5' 5\")   Wt 57.6 kg (127 lb)   SpO2 97%   BMI 21.13 kg/m²       Physical Exam:   Dressing:  clean, dry, intact  Wiggles Toes/Ankle  Foot sensation intact to light touch  No foot edema/ +1 Posterior Tibial Pulse    Intake and Output:  Current Shift:  No intake/output data recorded.  Last three shifts:  09/04 1901 - 09/06 0700  In: 1100 [I.V.:1100]  Out: 3800 [Urine:3800]  Voiding Status:  + void without need for Jordan catheter    Lab/Data Reviewed:  Recent Labs     09/06/24  0316   HGB 9.1*   HCT 26.7*      K 4.5   BUN 10         Medications Reviewed    Assessment/Plan     Principal Problem:    Septic joint (HCC)  Active Problems:    Temporal arteritis (HCC)    Hypokalemia    HTN (hypertension)    Constipation  Resolved Problems:    * No resolved hospital problems. *      S/P Revision RTKA with explant hardware and implant articulating spacer  Continue OOB and WBAT with PT  Reinforce dressing PRN - 4x4, ABD, soft roll, Ace wrap  D/C Planning for rehab  Will likely need PICC line prior to discharge - defer to ID  Hard copy RX printed and in patient chart including Lovenox for 21 days      The patient's plan of care discussed with Dr. Jarrett today as well and he is in agreement with above.

## 2024-09-06 NOTE — CARE COORDINATION
Medicare pt has received, reviewed, and signed 2nd IM letter informing them of their right to appeal the discharge.  Signed copy has been placed on pt bedside chart.    Patient's  Brian signed letter for patient.

## 2024-09-07 VITALS
TEMPERATURE: 97.6 F | SYSTOLIC BLOOD PRESSURE: 161 MMHG | HEART RATE: 63 BPM | DIASTOLIC BLOOD PRESSURE: 75 MMHG | WEIGHT: 127 LBS | RESPIRATION RATE: 17 BRPM | HEIGHT: 65 IN | OXYGEN SATURATION: 98 % | BODY MASS INDEX: 21.16 KG/M2

## 2024-09-07 PROCEDURE — 6360000002 HC RX W HCPCS: Performed by: INTERNAL MEDICINE

## 2024-09-07 PROCEDURE — 2580000003 HC RX 258: Performed by: PHYSICIAN ASSISTANT

## 2024-09-07 PROCEDURE — 6370000000 HC RX 637 (ALT 250 FOR IP): Performed by: PHYSICIAN ASSISTANT

## 2024-09-07 PROCEDURE — 6370000000 HC RX 637 (ALT 250 FOR IP): Performed by: ORTHOPAEDIC SURGERY

## 2024-09-07 PROCEDURE — 6360000002 HC RX W HCPCS: Performed by: PHYSICIAN ASSISTANT

## 2024-09-07 PROCEDURE — 2580000003 HC RX 258: Performed by: INTERNAL MEDICINE

## 2024-09-07 PROCEDURE — 6370000000 HC RX 637 (ALT 250 FOR IP): Performed by: INTERNAL MEDICINE

## 2024-09-07 PROCEDURE — 6370000000 HC RX 637 (ALT 250 FOR IP): Performed by: HOSPITALIST

## 2024-09-07 RX ADMIN — ENOXAPARIN SODIUM 40 MG: 100 INJECTION SUBCUTANEOUS at 09:21

## 2024-09-07 RX ADMIN — HYDROCHLOROTHIAZIDE 12.5 MG: 25 TABLET ORAL at 09:14

## 2024-09-07 RX ADMIN — OXYCODONE HYDROCHLORIDE 10 MG: 5 TABLET ORAL at 05:14

## 2024-09-07 RX ADMIN — OXYCODONE HYDROCHLORIDE 10 MG: 5 TABLET ORAL at 09:18

## 2024-09-07 RX ADMIN — SODIUM CHLORIDE, PRESERVATIVE FREE 10 ML: 5 INJECTION INTRAVENOUS at 08:58

## 2024-09-07 RX ADMIN — WATER 1000 MG: 1 INJECTION INTRAMUSCULAR; INTRAVENOUS; SUBCUTANEOUS at 00:03

## 2024-09-07 RX ADMIN — PANTOPRAZOLE SODIUM 40 MG: 40 TABLET, DELAYED RELEASE ORAL at 06:43

## 2024-09-07 RX ADMIN — DOCUSATE SODIUM 100 MG: 100 CAPSULE, LIQUID FILLED ORAL at 09:15

## 2024-09-07 RX ADMIN — AMLODIPINE BESYLATE 5 MG: 5 TABLET ORAL at 09:15

## 2024-09-07 RX ADMIN — PREDNISONE 4 MG: 1 TABLET ORAL at 09:14

## 2024-09-07 RX ADMIN — LISINOPRIL 40 MG: 20 TABLET ORAL at 09:14

## 2024-09-07 RX ADMIN — SODIUM CHLORIDE, PRESERVATIVE FREE 10 ML: 5 INJECTION INTRAVENOUS at 09:29

## 2024-09-07 RX ADMIN — OXYCODONE HYDROCHLORIDE 10 MG: 5 TABLET ORAL at 00:02

## 2024-09-07 RX ADMIN — MELOXICAM 15 MG: 7.5 TABLET ORAL at 09:15

## 2024-09-07 RX ADMIN — WATER 1000 MG: 1 INJECTION INTRAMUSCULAR; INTRAVENOUS; SUBCUTANEOUS at 08:56

## 2024-09-07 RX ADMIN — POLYETHYLENE GLYCOL 3350 17 G: 17 POWDER, FOR SOLUTION ORAL at 09:16

## 2024-09-07 RX ADMIN — Medication 1 TABLET: at 09:15

## 2024-09-07 ASSESSMENT — PAIN SCALES - GENERAL
PAINLEVEL_OUTOF10: 9
PAINLEVEL_OUTOF10: 7
PAINLEVEL_OUTOF10: 1
PAINLEVEL_OUTOF10: 10
PAINLEVEL_OUTOF10: 7
PAINLEVEL_OUTOF10: 9
PAINLEVEL_OUTOF10: 1

## 2024-09-07 ASSESSMENT — PAIN DESCRIPTION - LOCATION
LOCATION: KNEE
LOCATION: KNEE
LOCATION: KNEE;LEG

## 2024-09-07 ASSESSMENT — PAIN DESCRIPTION - DESCRIPTORS
DESCRIPTORS: ACHING
DESCRIPTORS: ACHING
DESCRIPTORS: SHARP

## 2024-09-07 ASSESSMENT — PAIN DESCRIPTION - ORIENTATION
ORIENTATION: RIGHT

## 2024-09-07 NOTE — PROGRESS NOTES
2025 Calling to give report to [phone]  Unable to have call go through stated that \" extension number [phone] didn't answer you have reached the voicemail please leave a message\"     Spoke with case management and attempted call to [phone] - went to  did not LVM.   Attempted to call [phone] which is a personal cell for Mansi. Stated to call the main line and provided [phone]. Attempted to call back again to [phone] and LVM that am trying to give report. Pending response.     Case Management stated at 0953 the fire alarms are going off at facility and aware of patient going to facility. Will call back in about 10 min.     1016 Spoke with MD Jerry. Stated \" I have never provided a hardscript for antibiotic when leaving to SNF\". No hardscript provided and Case management is made aware is not in discharge packet and asked to verify it has already been sent.     1020 Carolyn and Nicki browne  notified stated will continue to check and call again for report.   1021 Attempt to call and give report. LVM again.     1023  Carolyn confirmed that SNF has IV abx. No further action needed to send Rx.      1103 Carolyn CM manager stated spoke with director of [facility] and is okay to continue sending pt. Despite pending report call back.

## 2024-09-07 NOTE — PROGRESS NOTES
Hospitalist Progress Note    Patient: Lanny Elliott MRN: [id number]  CSN: [id number]    YOB: 1945  Age: 79 y.o.  Sex: female    DOA: 8/24/2024 LOS:  LOS: 14 days          Chief Complaint:  Lanny Elliott is a 79 y.o. female who has been seen for right knee pain and swelling and was transferred and admitted to ortho service for septic joint    Subjective:  My knee still in pain. I am leaving today. No chest pain, no diarrhea    Review of systems:  Constitutional: denies fevers, chills, myalgias  Respiratory: denies SOB, cough  Cardiovascular: denies chest pain, palpitations  Gastrointestinal: denies nausea, vomiting, diarrhea  Msk : knee pain     10 systems reviewed, all negative other than what is mentioned above.      Vital signs/Intake and Output:  Visit Vitals  BP (!) 142/67   Pulse 79   Temp 98.7 °F (37.1 °C) (Oral)   Resp 17   Ht 1.651 m (5' 5\")   Wt 57.6 kg (127 lb)   SpO2 97%   BMI 21.13 kg/m²     Current Shift:  09/07 0701 - 09/07 1900  In: -   Out: 500 [Urine:500]  Last three shifts:  09/05 1901 - 09/07 0700  In: 4268 [I.V.:4268]  Out: 4200 [Urine:4200]    Exam:    General: Well developed, alert, NAD, OX3  Head/Neck: NCAT, supple, No masses, No lymphadenopathy  CVS:Regular rate and rhythm, no M/R/G, S1/S2 heard, no thrill  Lungs:Clear to auscultation bilaterally, no wheezes, rhonchi, or rales  Abdomen: Soft, Nontender, No distention, Normal Bowel sounds, No hepatomegaly  Extremities: No C/C/E, pulses palpable 2+ rt leg wrapped with ace bandage   Skin:normal texture and turgor, no rashes, no lesions  Neuro:grossly normal , follows commands  Psych:appropriate    Labs: Results:       Chemistry Recent Labs     09/05/24  0457 09/06/24  0316   GLUCOSE 117* 110*    137   K 4.8 4.5    103   CO2 30 28   BUN 10 10   CREATININE 0.61 0.51*   CALCIUM 10.1 9.5   BILITOT 0.4 0.5   AST 27 27   ALT 83* 67*   ALKPHOS 59 53   GLOB 2.6 2.3   LABGLOM 90 >90      CBC w/Diff Recent Labs

## 2024-09-07 NOTE — PROGRESS NOTES
Progress Note POD #      Patient: Lanny Elliott               Sex: female          DOA: 8/24/2024         YOB: 1945      Surgery: Procedure(s):  REVISION RIGHT TOTAL KNEE- (ANTIBIOTIC SPACER) (PT IN ROOM #326)           LOS: 14 days               Subjective:     No new complaints    Objective:      Visit Vitals  /67   Pulse 82   Temp 98.5 °F (36.9 °C) (Oral)   Resp 18   Ht 1.651 m (5' 5\")   Wt 57.6 kg (127 lb)   SpO2 97%   BMI 21.13 kg/m²       Physical Exam:  Neurological: motor strength: 5/5 in lower extremities bilaterally                          sensation: intact to light touch  Left LE: Wound C/D/I, minimal foot swelling      Intake and Output:  Current Shift:  No intake/output data recorded.  Last three shifts:  09/05 1901 - 09/07 0700  In: 4268 [I.V.:4268]  Out: 4200 [Urine:4200]      Lab/Data Reviewed:  Lab Results   Component Value Date/Time    WBC 19.7 09/06/2024 03:16 AM    HGB 9.1 09/06/2024 03:16 AM    HCT 26.7 09/06/2024 03:16 AM     09/06/2024 03:16 AM    MCV 92.1 09/06/2024 03:16 AM     Lab Results   Component Value Date/Time    APTT 24.0 08/26/2024 03:05 AM     Lab Results   Component Value Date/Time    INR 0.9 08/26/2024 03:05 AM    INR 0.9 08/07/2024 04:15 PM      Recent Labs     09/05/24  0457 09/06/24  0316   HGB 11.2* 9.1*           Assessment/Plan     Principal Problem:    Septic joint (HCC)  Active Problems:    Temporal arteritis (HCC)    Hypokalemia    HTN (hypertension)    Constipation    Elevated LFTs  Resolved Problems:    * No resolved hospital problems. *      1. Stable  2. OOB with PT  3. D/C Planning to rehab today  4. Follow-up in 10 days [facility] with Dr. Jarrett

## 2024-09-08 NOTE — PROGRESS NOTES
1110 AVS reviewed with patient. Opportunity for questions provided. Gathered patient belongings. Provided report to [facility] for transport. Several attempts made to give report and continue to VM. Pending response / call back from facility since can not get past voicemail.

## 2024-09-09 LAB
BACTERIA SPEC CULT: NORMAL
BACTERIA SPEC CULT: NORMAL
SERVICE CMNT-IMP: NORMAL
SERVICE CMNT-IMP: NORMAL

## 2024-09-10 LAB
BACTERIA SPEC CULT: NORMAL
BACTERIA SPEC CULT: NORMAL
GRAM STN SPEC: NORMAL
GRAM STN SPEC: NORMAL
SERVICE CMNT-IMP: NORMAL
SERVICE CMNT-IMP: NORMAL

## 2024-09-18 LAB
ACID FAST STN SPEC: NEGATIVE
MYCOBACTERIUM SPEC QL CULT: POSITIVE
SPECIMEN PREPARATION: ABNORMAL
SPECIMEN SOURCE: ABNORMAL

## 2024-10-02 ENCOUNTER — HOSPITAL ENCOUNTER (OUTPATIENT)
Facility: HOSPITAL | Age: 79
Discharge: HOME | End: 2024-10-02
Attending: INTERNAL MEDICINE
Payer: MEDICARE

## 2024-10-02 VITALS
BODY MASS INDEX: 19.64 KG/M2 | TEMPERATURE: 99.1 F | HEART RATE: 69 BPM | WEIGHT: 118 LBS | SYSTOLIC BLOOD PRESSURE: 139 MMHG | DIASTOLIC BLOOD PRESSURE: 57 MMHG | RESPIRATION RATE: 18 BRPM

## 2024-10-02 PROCEDURE — 99212 OFFICE O/P EST SF 10 MIN: CPT

## 2024-10-02 RX ORDER — PREDNISONE 1 MG/1
3 TABLET ORAL DAILY
COMMUNITY

## 2024-10-02 RX ORDER — OXYCODONE HYDROCHLORIDE 5 MG/1
5 CAPSULE ORAL EVERY 6 HOURS PRN
COMMUNITY

## 2024-10-02 RX ORDER — ONDANSETRON 4 MG/1
4 TABLET, FILM COATED ORAL EVERY 8 HOURS PRN
COMMUNITY

## 2024-10-02 RX ORDER — DOCUSATE SODIUM 100 MG/1
100 CAPSULE, LIQUID FILLED ORAL 2 TIMES DAILY PRN
COMMUNITY

## 2024-10-02 NOTE — DISCHARGE INSTRUCTIONS
Discharge Instructions from  [place]fectious [place] [address] [phone] Fax [phone]        Return Appointment:  [x] As needed per MD

## 2024-10-02 NOTE — PLAN OF CARE
Clinical Level of Care Assessment    Outpatient ID Follow up Visits      NAME:  Lanny Elliott  YOB: 1945  MEDICAL RECORD NUMBER:  [id number]   DATE:  10/2/2024      Patient ID Follow Up Assessment   Points   Review of chart []   0   Assess the WC Navigator for assessment of Vital Signs, HT/WT, Medication/Allergy review, Medical/Surgical/Psychological and Psychosocial screenings.    [x]   1   Assess the in WC Navigator for assessment of any ID related concerns  Observe patient/caregiver with hands-on care.    []   2   Assess the WC  Navigator for assessment of; ID related concerns, PICC line, Home Health/outpatient adjustments.  Assist physician with PICC line care/removal.   []   3         Ambulation Status Documented in Activities of Daily Living Screening Flowsheet  Status Definition Points   Independent Independently able to ambulate.  Fully able (without any assistance) to get on/off the exam table/chair.    []   0   Minimal Physical Assistance Requires physical assistance of one person to ambulate and position the patient to be examined. Includes necessary physical assistance to position lower extremities on/off the stool.   [x]   1   Moderate Physical Assistance Requires at least one staff member to physically assist the patient in ambulating into the treatment room and/or on off chair/bed.  Requires assistance to the bathroom.   []   2   Full Assistance Requires assistance of at least two staff members to transfer the patient into the treatment room and/or on/off bed/chair. \"Total Transfer.”  Unable to use bathroom requires bedside commode and/or bedpan []   3       Teaching Effort Documented in WOC[facility] Clinical Note  Effort Definition Points   No Teaching  []   0   General Initial/Simple lesson:  Assess readiness to learn, and assess patient learning style to determine educational flow/special needs for learning.  Teaching related to 1-3 topics  Documentation in Epic completed.   [x]   1

## 2024-10-02 NOTE — PROGRESS NOTES
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 Implant placed. NO OR culture sent    Orthopedic history:  --BL Partial knee TKA >20 years ago  --Right partial TKA- pain from 03/2024  --increasing swelling, unable to walk from last week.   --denies prior po abx, prior injection to her knee  --joint tap 05/2024-- WBC bloody- 3375- 91% N, no growth.   --ESR 1, CRP 0.9--8/25/24  --ESR 1  , CRP <0.3--9/5/24    Post op leucocytosis to 19K- improved  Abnormal LFT-- improved. GB distension on MRI but cholecysitis was not suspected    Co-morbidities:    Post op Anemia  TA on immunosuppression-  treatment X1 year. Tociluzumab on hold and steroid tapered down- on 3 mg Prednisone currently  GERD  History of ORIF for R clavicle fracture     Microlab data:    8/7-MRSA screening-negative       --urine culture negative  8/25- Blood culture X2- NG           UA-no pyuria    Additional data:    CT KNEE RIGHT W CONTRAST    Result Date: 8/24/2024  CT right knee. COMPARISON: 7/3/2024.   IMPRESSION: Partial right knee replacement. Periprosthetic osteolysis, worse in

## 2024-10-10 LAB
OTHER MICROORG DNA SPEC NAA+PROBE: ABNORMAL
SPECIMEN SOURCE: ABNORMAL
SUSCEPTIBILITY TESTING: ABNORMAL

## 2024-10-11 LAB
ACID FAST STN SPEC: NEGATIVE
MYCOBACTERIUM SPEC QL CULT: NEGATIVE
SPECIMEN PREPARATION: NORMAL
SPECIMEN SOURCE: NORMAL

## 2024-10-14 LAB
ACID FAST STN SPEC: NEGATIVE
MYCOBACTERIUM SPEC QL CULT: POSITIVE
OTHER MICROORG DNA SPEC NAA+PROBE: ABNORMAL
SPECIMEN PREPARATION: ABNORMAL
SPECIMEN SOURCE: ABNORMAL
SPECIMEN SOURCE: ABNORMAL
SUSCEPTIBILITY TESTING: ABNORMAL

## 2024-10-15 LAB
AMIKACIN ISLT MIC: ABNORMAL
AMOXICILLIN+CLAV ISLT MIC: ABNORMAL
CEFTRIAXONE ISLT MIC: ABNORMAL
CIPROFLOXACIN ISLT MIC: ABNORMAL
CLARITHRO ISLT MIC: ABNORMAL
DOXYCYCLINE: ABNORMAL
IMIPENEM ISLT MIC: ABNORMAL
LINEZOLID ISLT MIC: ABNORMAL
MINOCYCLINE ISLT MIC: ABNORMAL
MOXIFLOXACIN: ABNORMAL
NOCARDIA ISLT: ABNORMAL
SPECIMEN SOURCE: ABNORMAL
TMP SMX ISLT MIC: ABNORMAL
TOBRAMYCIN ISLT MIC: ABNORMAL

## 2024-10-20 NOTE — PROGRESS NOTES
[facility]  (A Division of [facility])                                                           Date of Clinic: (Not on file)       Reason for FU Consult: chronic Right PJI  Ortho MD: Dr Jarrett    C/C: right knee pain/swelling    Current Antimicrobials:    Prior Antimicrobials:    Ceftriaxone -> cefazolin IV daily 8/30 to date- until 11/12/24   Zosyn IV  8/25 to 27  Vanco 8/25 to 8/30  Cefepime 8/29 to 8/30     Allergy to antibiotics: NA       Assessment--ID related:     Immunosuppressed and complex patient, due to prior Tociluzumab, currently off:    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 Implant placed. NO OR culture sent  --DW Dr Jarrett regarding removal of all implant after her last visit with me. At this time, given her age and co-morbidities, has no plans for repeat surgery. He will continue to follow and assess her response. Feedback was given to patient and her family.    Orthopedic history:  --BL Partial knee TKA >20 years ago  --Right partial TKA- pain from 03/2024  --increasing swelling, unable to walk from last week.   --denies prior po abx, prior injection to her knee  --joint tap 05/2024-- WBC bloody- 3375- 91% N, no growth.   --ESR 1, CRP 0.9--8/25/24  --ESR 1  , CRP <0.3--9/5/24  --Lab 10/7- CRP 0.73( 0-5), cbc and CMP ok    Post op leucocytosis to 19K- resolved  Abnormal LFT-- improved. GB distension on MRI but cholecysitis was not suspected    Co-morbidities:    Post op Anemia  TA on immunosuppression-  treatment X1 year. Tociluzumab on hold and steroid tapered down- on 3 mg Prednisone currently  GERD  History of ORIF for R clavicle fracture

## 2024-10-23 ENCOUNTER — HOSPITAL ENCOUNTER (OUTPATIENT)
Facility: HOSPITAL | Age: 79
Discharge: HOME | End: 2024-10-23
Attending: INTERNAL MEDICINE | Admitting: INTERNAL MEDICINE
Payer: MEDICARE

## 2024-10-23 VITALS
WEIGHT: 121.25 LBS | BODY MASS INDEX: 20.18 KG/M2 | RESPIRATION RATE: 18 BRPM | DIASTOLIC BLOOD PRESSURE: 64 MMHG | OXYGEN SATURATION: 100 % | SYSTOLIC BLOOD PRESSURE: 149 MMHG | HEART RATE: 75 BPM | TEMPERATURE: 98.8 F

## 2024-10-23 PROCEDURE — 99212 OFFICE O/P EST SF 10 MIN: CPT

## 2024-10-23 NOTE — DISCHARGE INSTRUCTIONS
Discharge Instructions from  [place] [address] [phone] Fax [phone]    Do not remove dressing     Return Appointment:  [] Wound and dressing supply provider:   [] ECF or Home Healthcare:  [] Wound Assessment: [] Physician or NP scheduled for Wound Assessment:   [x] Return Appointment: With MD  in  1 Week(s)  [] Ordered tests:       Wound Care Center Information: Should you experience any significant changes in your wound(s) or have questions about your wound care, please contact the [facility] at MONDAY - FRIDAY 8:00 am - 4:30.  If you need help with your wound outside these hours and cannot wait until we are again available, contact your PCP or go to the hospital emergency room.     PLEASE NOTE: IF YOU ARE UNABLE TO OBTAIN WOUND SUPPLIES, CONTINUE TO USE THE SUPPLIES YOU HAVE AVAILABLE UNTIL YOU ARE ABLE TO REACH US. IT IS MOST IMPORTANT TO KEEP THE WOUND COVERED AT ALL TIMES.

## 2024-10-23 NOTE — PLAN OF CARE
Clinical Level of Care Assessment    Outpatient ID Follow up Visits      NAME:  Lanny Elliott  YOB: 1945  MEDICAL RECORD NUMBER:  [id number]   DATE:  10/23/2024      Patient ID Follow Up Assessment   Points   Review of chart []   0   Assess the WC Navigator for assessment of Vital Signs, HT/WT, Medication/Allergy review, Medical/Surgical/Psychological and Psychosocial screenings.    [x]   1   Assess the in WC Navigator for assessment of any ID related concerns  Observe patient/caregiver with hands-on care.    []   2   Assess the WC  Navigator for assessment of; ID related concerns, PICC line, Home Health/outpatient adjustments.  Assist physician with PICC line care/removal.   []   3         Ambulation Status Documented in Activities of Daily Living Screening Flowsheet  Status Definition Points   Independent Independently able to ambulate.  Fully able (without any assistance) to get on/off the exam table/chair.    []   0   Minimal Physical Assistance Requires physical assistance of one person to ambulate and position the patient to be examined. Includes necessary physical assistance to position lower extremities on/off the stool.   [x]   1   Moderate Physical Assistance Requires at least one staff member to physically assist the patient in ambulating into the treatment room and/or on off chair/bed.  Requires assistance to the bathroom.   []   2   Full Assistance Requires assistance of at least two staff members to transfer the patient into the treatment room and/or on/off bed/chair. \"Total Transfer.”  Unable to use bathroom requires bedside commode and/or bedpan []   3       Teaching Effort Documented in WOCN Clinical Note  Effort Definition Points   No Teaching  []   0   General Initial/Simple lesson:  Assess readiness to learn, and assess patient learning style to determine educational flow/special needs for learning.  Teaching related to 1-3 topics  Documentation in Epic completed.   [x]   1

## 2024-11-18 ENCOUNTER — TELEPHONE (OUTPATIENT)
Facility: HOSPITAL | Age: 79
End: 2024-11-18

## 2024-11-18 NOTE — TELEPHONE ENCOUNTER
Call received from patient on Friday  Severe diarrhea on IV antibiotics that is stopped now( ceftriaxone X6 weeks)  Went to ED twice, patient and her daughter tells me that tested for C.diff X2- and was negative.  She is off IV antibitoics now, and her diarrhea is improving    I went in to [facility] to review ED visit -- I don't see test for C.diff, but patient and family state tested X2 and negative    She has WBC of 13K, increasing CRP/ESR--to 22/84 respectively on 11/12/24  CT scan Abd/Pelvis 11/11 with:    --The thickened wall of the distal transverse colon. This may be due to peristalsis. An annular neoplasm is not excluded. Infectious or inflammatory etiology is also not excluded.   -- Distended gallbladder, mild intrahepatic biliary dilatation and possible mass in the head of the pancreas. Consider abdominal MRI and MRCP for further evaluation. The intrahepatic biliary dilatation is new compared to the CT of 10/22/2024.     Imp:  Antibiotic associated diarrhea - if C.dif X2 is negative-- but I don't see test of C.diff, at least in [facility]       WBC was up to 13K, ESR/CRP went up-- suspect due to abdominal issue.       - reviewed ED work up from 11/15- at [facility]-- off abx.      WBC 8.99, resp viral testing negative, and C.diff toxin test negative-   per my chart review from patient access      - if persistent diarrhea off antibitoics, need to repeat C.diff and /OR emperically treat for C.diff          Abn CT of AP- GB distension/ possible pancreas mass-- work up with MRI of abd in progress-- gastro-entrology consult pending     -- this is new compared to CT AP done on 10/24/24    R Knee PJI, HW in place-- grew Actinomyces( AFB cutlure positive, id by PCR) and E.coli( Amp RR)-- treated with IV ceftriaxone. Plan was to continue with long term PO Augmentin. Given her significant diarrhea- that has not yet resolved completely yet- reluctant to pursue any additional abx at this time.

## 2024-12-17 ENCOUNTER — TELEPHONE (OUTPATIENT)
Facility: HOSPITAL | Age: 79
End: 2024-12-17

## 2024-12-17 NOTE — TELEPHONE ENCOUNTER
Notified from [facility] NP -- that patient will be leaving today    Had phone conference between NP and her dtr-Yeny    Per NP-patient improving  Diarrhea resolved off Augmentin  Restarted after 1 week break on doxycycline which she is tolerating so far,  with hope of keep Actinomyces +/ E.coli  R knee PJI suppressed--no other PO antibiotic available to give her.    She has improved overall, no fever.  Asymptomatic for UTI, but her urine has Pseudmonas --not treating at this time. Explained to NP and dtr that no need to treat asymptomatic bacteruria. Encourged hydration, making sure to empty bladder etc.    If she becomes symptomatic, to pursue work up incluidng panculture and emperic rx to include Pseudmonas.    She is to FU with me in Jan 2025 or after, family to call and schedule  MRI of abd done and reportedly normal-- is to be faxed to my office- dtr has fax number    .sig

## 2025-02-12 ENCOUNTER — HOSPITAL ENCOUNTER (OUTPATIENT)
Facility: HOSPITAL | Age: 80
Discharge: HOME | End: 2025-02-12
Attending: INTERNAL MEDICINE | Admitting: INTERNAL MEDICINE
Payer: MEDICARE

## 2025-02-12 VITALS
BODY MASS INDEX: 20.16 KG/M2 | DIASTOLIC BLOOD PRESSURE: 63 MMHG | RESPIRATION RATE: 16 BRPM | SYSTOLIC BLOOD PRESSURE: 155 MMHG | TEMPERATURE: 98.1 F | HEIGHT: 65 IN | OXYGEN SATURATION: 100 % | WEIGHT: 121 LBS | HEART RATE: 75 BPM

## 2025-02-12 PROCEDURE — 99211 OFF/OP EST MAY X REQ PHY/QHP: CPT

## 2025-02-12 NOTE — PROGRESS NOTES
[facility]  (A Division of [facility])                                                           Date of Clinic: 2/12/2025       Reason for FU Consult: chronic Right PJI  Ortho MD: Dr Jarrett  PCP: Sigifredo Espinoza MD     C/C: right knee pain/swelling    Current Antimicrobials:    Prior Antimicrobials:    Ceftriaxone -> cefazolin IV daily 8/30 to date- until 11/12/24   Zosyn IV  8/25 to 27  Vanco 8/25 to 8/30  Cefepime 8/29 to 8/30     Allergy to antibiotics: NA       Assessment--ID related:     Immunosuppressed and complex patient, due to prior Tociluzumab, currently off:    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 Implant placed. NO OR culture sent  --DW Dr Jarrett regarding removal of all implant after her last visit with me. At this time, given her age and co-morbidities, has no plans for repeat surgery. He will continue to follow and assess her response. Feedback was given to patient and her family.    Orthopedic history:  --BL Partial knee TKA >20 years ago  --Right partial TKA- pain from 03/2024  --increasing swelling, unable to walk from last week.   --denies prior po abx, prior injection to her knee  --joint tap 05/2024-- WBC bloody- 3375- 91% N, no growth.   --ESR 1, CRP 0.9--8/25/24  --ESR 1  , CRP <0.3--9/5/24  --Lab 10/7- CRP 0.73( 0-5), cbc and CMP ok    Post op leucocytosis to 19K- resolved  Abnormal LFT-- improved. GB distension on MRI but cholecysitis was not suspected    Co-morbidities:    Post op Anemia  TA on immunosuppression-  treatment X1 year. Tociluzumab on hold and steroid tapered down- on 3 mg Prednisone currently  GERD  History of ORIF for R

## 2025-02-24 ENCOUNTER — TELEPHONE (OUTPATIENT)
Facility: HOSPITAL | Age: 80
End: 2025-02-24

## 2025-02-24 NOTE — TELEPHONE ENCOUNTER
Tried to send doxycycline 1 year supply to [facility] address she provided  [address] [address], [place] [address]    No such address available in epic.    My nurse to contact pt for a different address    Bri Diana MD  [facility])  Office: [phone] -Option #8  Office fax:  [phone]

## 2025-02-26 ENCOUNTER — TELEPHONE (OUTPATIENT)
Facility: HOSPITAL | Age: 80
End: 2025-02-26

## 2025-02-26 RX ORDER — DOXYCYCLINE HYCLATE 100 MG
100 TABLET ORAL 2 TIMES DAILY
Qty: 180 TABLET | Refills: 3 | Status: SHIPPED | OUTPATIENT
Start: 2025-03-01 | End: 2025-05-30

## 2025-02-26 NOTE — TELEPHONE ENCOUNTER
Pl[facility] for his pharmacy and sent off doxyycline 100 mg BID X3 months, refill X 3    Bri Diana MD  [facility])  Office: [phone] -Option #8  Office fax:  [phone]

## (undated) DEVICE — SPONGE GZ W4XL4IN COT 12 PLY TYP VII WVN C FLD DSGN STERILE

## (undated) DEVICE — HOOD, PEEL-AWAY: Brand: FLYTE

## (undated) DEVICE — STRYKER PERFORMANCE SERIES SAGITTAL BLADE: Brand: STRYKER PERFORMANCE SERIES

## (undated) DEVICE — STRIP,CLOSURE,WOUND,MEDI-STRIP,1/2X4: Brand: MEDLINE

## (undated) DEVICE — SUTURE PDS + SZ 1 L96IN ABSRB VLT L65MM TP-1 1/2 CIR PDP880G

## (undated) DEVICE — SOLUTION IV 1000ML LAC RINGERS PH 6.5 INJ USP VIAFLX PLAS

## (undated) DEVICE — GLOVE SURG SZ 7 L12IN FNGR THK79MIL GRN LTX FREE

## (undated) DEVICE — DRESSING FOAM POST OPERATIVE 30X10 CM MEPLIX BORDER

## (undated) DEVICE — PAD,ABDOMINAL,5"X9",ST,LF,25/BX: Brand: MEDLINE INDUSTRIES, INC.

## (undated) DEVICE — PACK PROCEDURE SURG TOT KNEE CUST

## (undated) DEVICE — SOLUTION IRRIG 500ML 0.9% SOD CHLO USP POUR PLAS BTL

## (undated) DEVICE — PADDING CAST W6INXL4YD COT LO LINTING WYTEX

## (undated) DEVICE — PIN DRL QUIK HI PERF FOR SIG SYS

## (undated) DEVICE — SYRINGE MED 30ML STD CLR PLAS LUERLOCK TIP N CTRL DISP

## (undated) DEVICE — IMMOBILIZER KNEE UNIV L19IN FOR 12-24IN THGH FOAM T BAR

## (undated) DEVICE — GLOVE ORANGE PI 8 1/2   MSG9085

## (undated) DEVICE — SOLUTION IRRIG 3000ML LAC R FLX CONT

## (undated) DEVICE — SUTURE MONOCRYL SZ 3-0 L27IN ABSRB UD PS-2 3/8 CIR REV CUT NDL MCP427H

## (undated) DEVICE — YANKAUER,FLEXIBLE HANDLE,REGLR CAPACITY: Brand: MEDLINE INDUSTRIES, INC.

## (undated) DEVICE — MASTISOL ADHESIVE LIQ 2/3ML

## (undated) DEVICE — GARMENT,MEDLINE,DVT,INT,CALF,MED, GEN2: Brand: MEDLINE

## (undated) DEVICE — HANDPIECE SET WITH HIGH FLOW TIP AND SUCTION TUBE: Brand: INTERPULSE

## (undated) DEVICE — GOWN,SIRUS,POLYRNF,BRTHSLV,XL,30/CS: Brand: MEDLINE

## (undated) DEVICE — SUTURE ETHILON SZ 2 L30IN NONABSORBABLE BLK L75MM LR 3/8 CIR 490T

## (undated) DEVICE — INTENDED FOR TISSUE SEPARATION, AND OTHER PROCEDURES THAT REQUIRE A SHARP SURGICAL BLADE TO PUNCTURE OR CUT.: Brand: BARD-PARKER SAFETY BLADES SIZE 10, STERILE

## (undated) DEVICE — 3M™ STERI-DRAPE™ U-DRAPE 1015: Brand: STERI-DRAPE™

## (undated) DEVICE — GARMENT COMPR M FOR 13IN FT INTMIT SGL BLDR HEM FORC II

## (undated) DEVICE — TUBE IRRIG L8IN LNG PT W/ CONN FOR PMP SYS REDEUCE

## (undated) DEVICE — GUIDEPIN ORTH THRD HI PERF HD SIG

## (undated) DEVICE — DRESSING,GAUZE,XEROFORM,CURAD,5"X9",ST: Brand: CURAD

## (undated) DEVICE — SWAB CULT LIQ STUART AGR AERB MOD IN BRK SGL RAYON TIP PLAS

## (undated) DEVICE — TUBING PMP L8FT LNG W/ CONN FOR AR-6400 REDEUCE

## (undated) DEVICE — PACK PROCEDURE SURG KNEE ARTHSCP CUST

## (undated) DEVICE — NEEDLE SPNL L3.5IN PNK HUB S STL REG WALL FIT STYL W/ QNCKE

## (undated) DEVICE — THE CANADY HYBRID PLASMA SCALPEL IS AN ELECTROSURGICAL PLASMA SCALPEL THAT USES AN 85MM BENDABLE PADDLE BLADE TIP. THE ELECTROSURGICAL PLASMA SCALPEL IS USED TO SIMULTANEOUSLY CUT AND COAGULATE BIOLOGICAL TISSUE.: Brand: CANADY HYBRID PLASMA PADDLE BLADE

## (undated) DEVICE — GLOVE SURG SZ 85 L12IN FNGR ORTHO 126MIL CRM LTX FREE

## (undated) DEVICE — NEEDLE SPNL 20GA L3.5IN YEL HUB S STL REG WALL FIT STYL

## (undated) DEVICE — APPLICATOR MEDICATED 26 CC SOLUTION HI LT ORNG CHLORAPREP

## (undated) DEVICE — GLOVE ORANGE PI 7   MSG9070